# Patient Record
Sex: MALE | Race: WHITE | Employment: OTHER | URBAN - METROPOLITAN AREA
[De-identification: names, ages, dates, MRNs, and addresses within clinical notes are randomized per-mention and may not be internally consistent; named-entity substitution may affect disease eponyms.]

---

## 2017-02-10 DIAGNOSIS — M62.838 MUSCLE SPASM: ICD-10-CM

## 2017-02-10 RX ORDER — CYCLOBENZAPRINE HCL 10 MG
10 TABLET ORAL 3 TIMES DAILY PRN
Qty: 60 TABLET | Refills: 1 | Status: SHIPPED | OUTPATIENT
Start: 2017-02-10 | End: 2020-07-02

## 2017-02-14 ENCOUNTER — HOSPITAL ENCOUNTER (OUTPATIENT)
Age: 37
Setting detail: SPECIMEN
Discharge: HOME | End: 2017-02-14
Payer: MEDICARE

## 2017-02-14 ENCOUNTER — OFFICE VISIT (OUTPATIENT)
Dept: FAMILY MEDICINE CLINIC | Facility: CLINIC | Age: 37
End: 2017-02-14

## 2017-02-14 VITALS
WEIGHT: 224 LBS | DIASTOLIC BLOOD PRESSURE: 74 MMHG | HEART RATE: 89 BPM | BODY MASS INDEX: 29.69 KG/M2 | TEMPERATURE: 97.9 F | SYSTOLIC BLOOD PRESSURE: 112 MMHG | RESPIRATION RATE: 16 BRPM | HEIGHT: 73 IN | OXYGEN SATURATION: 97 %

## 2017-02-14 DIAGNOSIS — M54.42 ACUTE LEFT-SIDED LOW BACK PAIN WITH LEFT-SIDED SCIATICA: Primary | ICD-10-CM

## 2017-02-14 DIAGNOSIS — M54.42 CHRONIC BILATERAL LOW BACK PAIN WITH LEFT-SIDED SCIATICA: ICD-10-CM

## 2017-02-14 DIAGNOSIS — M25.50 ARTHRALGIA OF MULTIPLE JOINTS: ICD-10-CM

## 2017-02-14 DIAGNOSIS — R52 PAIN: ICD-10-CM

## 2017-02-14 DIAGNOSIS — G89.29 CHRONIC BILATERAL LOW BACK PAIN WITH LEFT-SIDED SCIATICA: ICD-10-CM

## 2017-02-14 DIAGNOSIS — M54.16 LUMBAR RADICULOPATHY: ICD-10-CM

## 2017-02-14 PROCEDURE — G8484 FLU IMMUNIZE NO ADMIN: HCPCS | Performed by: FAMILY MEDICINE

## 2017-02-14 PROCEDURE — 99213 OFFICE O/P EST LOW 20 MIN: CPT | Performed by: FAMILY MEDICINE

## 2017-02-14 PROCEDURE — G8427 DOCREV CUR MEDS BY ELIG CLIN: HCPCS | Performed by: FAMILY MEDICINE

## 2017-02-14 PROCEDURE — G8419 CALC BMI OUT NRM PARAM NOF/U: HCPCS | Performed by: FAMILY MEDICINE

## 2017-02-14 PROCEDURE — 4004F PT TOBACCO SCREEN RCVD TLK: CPT | Performed by: FAMILY MEDICINE

## 2017-02-14 RX ORDER — HYDROCODONE BITARTRATE AND ACETAMINOPHEN 5; 325 MG/1; MG/1
1 TABLET ORAL EVERY 6 HOURS PRN
Qty: 20 TABLET | Refills: 0 | Status: SHIPPED | OUTPATIENT
Start: 2017-02-14 | End: 2018-11-20

## 2017-02-14 ASSESSMENT — ENCOUNTER SYMPTOMS
CONSTIPATION: 0
SORE THROAT: 0
RHINORRHEA: 0
NAUSEA: 0
SHORTNESS OF BREATH: 0
WHEEZING: 0
COUGH: 0
BLOOD IN STOOL: 0
BACK PAIN: 1
DIARRHEA: 0
ABDOMINAL PAIN: 0
VOMITING: 0

## 2017-02-14 ASSESSMENT — PATIENT HEALTH QUESTIONNAIRE - PHQ9
1. LITTLE INTEREST OR PLEASURE IN DOING THINGS: 0
SUM OF ALL RESPONSES TO PHQ QUESTIONS 1-9: 0
2. FEELING DOWN, DEPRESSED OR HOPELESS: 0
SUM OF ALL RESPONSES TO PHQ9 QUESTIONS 1 & 2: 0

## 2017-02-17 LAB

## 2017-02-27 DIAGNOSIS — M15.9 PRIMARY OSTEOARTHRITIS INVOLVING MULTIPLE JOINTS: ICD-10-CM

## 2017-02-27 DIAGNOSIS — F41.8 DEPRESSION WITH ANXIETY: ICD-10-CM

## 2017-02-27 RX ORDER — MELOXICAM 15 MG/1
TABLET ORAL
Qty: 90 TABLET | Refills: 1 | Status: SHIPPED | OUTPATIENT
Start: 2017-02-27 | End: 2017-10-26 | Stop reason: SINTOL

## 2017-02-27 RX ORDER — ESCITALOPRAM OXALATE 10 MG/1
10 TABLET ORAL DAILY
Qty: 90 TABLET | Refills: 1 | Status: SHIPPED | OUTPATIENT
Start: 2017-02-27 | End: 2017-09-16 | Stop reason: SDUPTHER

## 2017-04-25 ENCOUNTER — HOSPITAL ENCOUNTER (OUTPATIENT)
Age: 37
Discharge: HOME | End: 2017-04-25
Payer: MEDICARE

## 2017-04-25 PROCEDURE — 82365 CALCULUS SPECTROSCOPY: CPT

## 2017-04-29 LAB
STONE COMPOSITION: NORMAL
STONE DESCRIPTION: NORMAL
STONE MASS: 8 MG
STONE NUMBER: 1
STONE SIZE: NORMAL MM

## 2017-05-05 ENCOUNTER — OFFICE VISIT (OUTPATIENT)
Dept: FAMILY MEDICINE CLINIC | Age: 37
End: 2017-05-05
Payer: MEDICARE

## 2017-05-05 VITALS
HEART RATE: 91 BPM | TEMPERATURE: 97.9 F | HEIGHT: 73 IN | SYSTOLIC BLOOD PRESSURE: 123 MMHG | BODY MASS INDEX: 30.09 KG/M2 | RESPIRATION RATE: 16 BRPM | WEIGHT: 227 LBS | OXYGEN SATURATION: 97 % | DIASTOLIC BLOOD PRESSURE: 76 MMHG

## 2017-05-05 DIAGNOSIS — M79.605 LEFT LEG PAIN: Primary | ICD-10-CM

## 2017-05-05 DIAGNOSIS — B00.9 HERPES SIMPLEX: ICD-10-CM

## 2017-05-05 DIAGNOSIS — M62.838 MUSCLE SPASM: ICD-10-CM

## 2017-05-05 DIAGNOSIS — M54.42 CHRONIC BILATERAL LOW BACK PAIN WITH LEFT-SIDED SCIATICA: ICD-10-CM

## 2017-05-05 DIAGNOSIS — G89.29 CHRONIC BILATERAL LOW BACK PAIN WITH LEFT-SIDED SCIATICA: ICD-10-CM

## 2017-05-05 DIAGNOSIS — N20.0 LEFT NEPHROLITHIASIS: ICD-10-CM

## 2017-05-05 DIAGNOSIS — M99.03 SOMATIC DYSFUNCTION OF SPINE, LUMBAR: ICD-10-CM

## 2017-05-05 DIAGNOSIS — S86.892A SHIN SPLINT, LEFT, INITIAL ENCOUNTER: ICD-10-CM

## 2017-05-05 PROBLEM — S86.899A SHIN SPLINT: Status: ACTIVE | Noted: 2017-05-05

## 2017-05-05 PROCEDURE — 98925 OSTEOPATH MANJ 1-2 REGIONS: CPT | Performed by: FAMILY MEDICINE

## 2017-05-05 PROCEDURE — G8417 CALC BMI ABV UP PARAM F/U: HCPCS | Performed by: FAMILY MEDICINE

## 2017-05-05 PROCEDURE — G8427 DOCREV CUR MEDS BY ELIG CLIN: HCPCS | Performed by: FAMILY MEDICINE

## 2017-05-05 PROCEDURE — 99214 OFFICE O/P EST MOD 30 MIN: CPT | Performed by: FAMILY MEDICINE

## 2017-05-05 PROCEDURE — 4004F PT TOBACCO SCREEN RCVD TLK: CPT | Performed by: FAMILY MEDICINE

## 2017-05-05 RX ORDER — TAMSULOSIN HYDROCHLORIDE 0.4 MG/1
0.4 CAPSULE ORAL DAILY
COMMUNITY
End: 2017-10-26 | Stop reason: ALTCHOICE

## 2017-05-05 RX ORDER — PROMETHAZINE HYDROCHLORIDE 25 MG/1
25 TABLET ORAL
COMMUNITY
Start: 2017-04-18 | End: 2017-05-18

## 2017-05-05 RX ORDER — VALACYCLOVIR HYDROCHLORIDE 1 G/1
TABLET, FILM COATED ORAL
Qty: 12 TABLET | Refills: 3 | Status: SHIPPED | OUTPATIENT
Start: 2017-05-05 | End: 2018-05-29 | Stop reason: SDUPTHER

## 2017-05-05 ASSESSMENT — ENCOUNTER SYMPTOMS
BACK PAIN: 1
ABDOMINAL PAIN: 0
NAUSEA: 0
VOMITING: 0
SORE THROAT: 0
COUGH: 0
DIARRHEA: 0
CONSTIPATION: 0
WHEEZING: 0
RHINORRHEA: 0
BLOOD IN STOOL: 0
SHORTNESS OF BREATH: 0

## 2017-05-05 ASSESSMENT — PATIENT HEALTH QUESTIONNAIRE - PHQ9
SUM OF ALL RESPONSES TO PHQ9 QUESTIONS 1 & 2: 0
SUM OF ALL RESPONSES TO PHQ QUESTIONS 1-9: 0
1. LITTLE INTEREST OR PLEASURE IN DOING THINGS: 0
2. FEELING DOWN, DEPRESSED OR HOPELESS: 0

## 2017-05-17 ENCOUNTER — OFFICE VISIT (OUTPATIENT)
Dept: FAMILY MEDICINE CLINIC | Age: 37
End: 2017-05-17
Payer: MEDICARE

## 2017-05-17 VITALS
RESPIRATION RATE: 16 BRPM | SYSTOLIC BLOOD PRESSURE: 133 MMHG | WEIGHT: 224 LBS | TEMPERATURE: 98.9 F | HEART RATE: 65 BPM | OXYGEN SATURATION: 95 % | BODY MASS INDEX: 29.69 KG/M2 | HEIGHT: 73 IN | DIASTOLIC BLOOD PRESSURE: 88 MMHG

## 2017-05-17 DIAGNOSIS — J06.9 ACUTE URI: ICD-10-CM

## 2017-05-17 DIAGNOSIS — J20.9 ACUTE BRONCHITIS, UNSPECIFIED ORGANISM: ICD-10-CM

## 2017-05-17 DIAGNOSIS — J01.91 ACUTE RECURRENT SINUSITIS, UNSPECIFIED LOCATION: ICD-10-CM

## 2017-05-17 DIAGNOSIS — M54.32 LEFT SIDED SCIATICA: ICD-10-CM

## 2017-05-17 DIAGNOSIS — M51.26 HERNIATED LUMBAR INTERVERTEBRAL DISC: ICD-10-CM

## 2017-05-17 DIAGNOSIS — M54.42 ACUTE LEFT-SIDED LOW BACK PAIN WITH LEFT-SIDED SCIATICA: Primary | ICD-10-CM

## 2017-05-17 DIAGNOSIS — M51.36 DDD (DEGENERATIVE DISC DISEASE), LUMBAR: ICD-10-CM

## 2017-05-17 PROCEDURE — 4004F PT TOBACCO SCREEN RCVD TLK: CPT | Performed by: FAMILY MEDICINE

## 2017-05-17 PROCEDURE — G8417 CALC BMI ABV UP PARAM F/U: HCPCS | Performed by: FAMILY MEDICINE

## 2017-05-17 PROCEDURE — 99214 OFFICE O/P EST MOD 30 MIN: CPT | Performed by: FAMILY MEDICINE

## 2017-05-17 PROCEDURE — G8427 DOCREV CUR MEDS BY ELIG CLIN: HCPCS | Performed by: FAMILY MEDICINE

## 2017-05-17 RX ORDER — AMOXICILLIN AND CLAVULANATE POTASSIUM 875; 125 MG/1; MG/1
1 TABLET, FILM COATED ORAL 2 TIMES DAILY
COMMUNITY
End: 2017-06-01 | Stop reason: ALTCHOICE

## 2017-05-17 RX ORDER — GUAIFENESIN/DEXTROMETHORPHAN 100-10MG/5
5-10 SYRUP ORAL 4 TIMES DAILY PRN
COMMUNITY
End: 2017-10-26 | Stop reason: ALTCHOICE

## 2017-05-17 ASSESSMENT — ENCOUNTER SYMPTOMS
CONSTIPATION: 0
VOMITING: 0
NAUSEA: 0
SHORTNESS OF BREATH: 1
SINUS PRESSURE: 1
ABDOMINAL PAIN: 0
COUGH: 1
BACK PAIN: 1
WHEEZING: 1
BLOOD IN STOOL: 0
RHINORRHEA: 1
DIARRHEA: 1

## 2017-06-01 ENCOUNTER — OFFICE VISIT (OUTPATIENT)
Dept: FAMILY MEDICINE CLINIC | Age: 37
End: 2017-06-01
Payer: MEDICARE

## 2017-06-01 VITALS
SYSTOLIC BLOOD PRESSURE: 117 MMHG | RESPIRATION RATE: 18 BRPM | BODY MASS INDEX: 29.82 KG/M2 | HEIGHT: 73 IN | TEMPERATURE: 97.7 F | WEIGHT: 225 LBS | HEART RATE: 85 BPM | OXYGEN SATURATION: 99 % | DIASTOLIC BLOOD PRESSURE: 85 MMHG

## 2017-06-01 DIAGNOSIS — R59.0 RETROPERITONEAL LYMPHADENOPATHY: ICD-10-CM

## 2017-06-01 DIAGNOSIS — J32.9 SINUSITIS, UNSPECIFIED CHRONICITY, UNSPECIFIED LOCATION: ICD-10-CM

## 2017-06-01 DIAGNOSIS — J30.89 PERENNIAL ALLERGIC RHINITIS WITH SEASONAL VARIATION: ICD-10-CM

## 2017-06-01 DIAGNOSIS — J30.2 PERENNIAL ALLERGIC RHINITIS WITH SEASONAL VARIATION: ICD-10-CM

## 2017-06-01 DIAGNOSIS — J20.9 ACUTE BRONCHITIS, UNSPECIFIED ORGANISM: Primary | ICD-10-CM

## 2017-06-01 PROCEDURE — G8417 CALC BMI ABV UP PARAM F/U: HCPCS | Performed by: FAMILY MEDICINE

## 2017-06-01 PROCEDURE — 99213 OFFICE O/P EST LOW 20 MIN: CPT | Performed by: FAMILY MEDICINE

## 2017-06-01 PROCEDURE — G8427 DOCREV CUR MEDS BY ELIG CLIN: HCPCS | Performed by: FAMILY MEDICINE

## 2017-06-01 PROCEDURE — 4004F PT TOBACCO SCREEN RCVD TLK: CPT | Performed by: FAMILY MEDICINE

## 2017-06-01 RX ORDER — LORATADINE 10 MG/1
10 TABLET ORAL DAILY PRN
COMMUNITY
End: 2020-12-28

## 2017-06-01 ASSESSMENT — ENCOUNTER SYMPTOMS
TROUBLE SWALLOWING: 0
DIARRHEA: 1
BLOOD IN STOOL: 0
RHINORRHEA: 1
COUGH: 1
SHORTNESS OF BREATH: 0
SORE THROAT: 0
NAUSEA: 1
VOICE CHANGE: 0
ABDOMINAL PAIN: 0
WHEEZING: 1
SINUS PRESSURE: 1
BACK PAIN: 1
VOMITING: 0
CONSTIPATION: 0

## 2017-06-20 ENCOUNTER — HOSPITAL ENCOUNTER (OUTPATIENT)
Age: 37
Discharge: HOME | End: 2017-06-20
Payer: MEDICARE

## 2017-06-20 LAB
CALCIUM SERPL-MCNC: 9.6 MG/DL (ref 8.6–10.4)
PTH INTACT: 27.04 PG/ML (ref 15–65)
URIC ACID: 7.6 MG/DL (ref 3.4–7)

## 2017-06-20 PROCEDURE — 82310 ASSAY OF CALCIUM: CPT

## 2017-06-20 PROCEDURE — 83970 ASSAY OF PARATHORMONE: CPT

## 2017-06-20 PROCEDURE — 84550 ASSAY OF BLOOD/URIC ACID: CPT

## 2017-06-20 PROCEDURE — 36415 COLL VENOUS BLD VENIPUNCTURE: CPT

## 2017-06-21 ENCOUNTER — OFFICE VISIT (OUTPATIENT)
Dept: FAMILY MEDICINE CLINIC | Age: 37
End: 2017-06-21
Payer: MEDICARE

## 2017-06-21 VITALS
WEIGHT: 221 LBS | TEMPERATURE: 97.6 F | OXYGEN SATURATION: 98 % | SYSTOLIC BLOOD PRESSURE: 120 MMHG | DIASTOLIC BLOOD PRESSURE: 84 MMHG | HEART RATE: 83 BPM | RESPIRATION RATE: 14 BRPM | BODY MASS INDEX: 29.29 KG/M2 | HEIGHT: 73 IN

## 2017-06-21 DIAGNOSIS — H92.02 OTALGIA, LEFT: Primary | ICD-10-CM

## 2017-06-21 DIAGNOSIS — J06.9 UPPER RESPIRATORY TRACT INFECTION, UNSPECIFIED TYPE: ICD-10-CM

## 2017-06-21 DIAGNOSIS — H69.82 EUSTACHIAN TUBE DYSFUNCTION, LEFT: ICD-10-CM

## 2017-06-21 PROCEDURE — 99213 OFFICE O/P EST LOW 20 MIN: CPT | Performed by: FAMILY MEDICINE

## 2017-06-21 PROCEDURE — G8417 CALC BMI ABV UP PARAM F/U: HCPCS | Performed by: FAMILY MEDICINE

## 2017-06-21 PROCEDURE — 4004F PT TOBACCO SCREEN RCVD TLK: CPT | Performed by: FAMILY MEDICINE

## 2017-06-21 PROCEDURE — G8427 DOCREV CUR MEDS BY ELIG CLIN: HCPCS | Performed by: FAMILY MEDICINE

## 2017-06-21 RX ORDER — CEFUROXIME AXETIL 250 MG/1
250 TABLET ORAL 2 TIMES DAILY
Qty: 20 TABLET | Refills: 0 | Status: SHIPPED | OUTPATIENT
Start: 2017-06-21 | End: 2017-07-01

## 2017-06-21 ASSESSMENT — ENCOUNTER SYMPTOMS
SHORTNESS OF BREATH: 0
ABDOMINAL PAIN: 0
DIARRHEA: 0
VOMITING: 0
COUGH: 1
BLOOD IN STOOL: 0
SORE THROAT: 0
NAUSEA: 0
WHEEZING: 0
CONSTIPATION: 0
TROUBLE SWALLOWING: 0

## 2017-06-22 PROBLEM — H69.80 EUSTACHIAN TUBE DYSFUNCTION: Status: ACTIVE | Noted: 2017-06-22

## 2017-06-22 PROBLEM — J06.9 UPPER RESPIRATORY TRACT INFECTION: Status: ACTIVE | Noted: 2017-06-22

## 2017-06-22 PROBLEM — H69.90 EUSTACHIAN TUBE DYSFUNCTION: Status: ACTIVE | Noted: 2017-06-22

## 2017-06-22 PROBLEM — H92.09 OTALGIA: Status: ACTIVE | Noted: 2017-06-22

## 2017-07-24 RX ORDER — PRAVASTATIN SODIUM 40 MG
40 TABLET ORAL DAILY
Qty: 90 TABLET | Refills: 0 | Status: SHIPPED | OUTPATIENT
Start: 2017-07-24 | End: 2017-10-15 | Stop reason: SDUPTHER

## 2017-10-26 ENCOUNTER — OFFICE VISIT (OUTPATIENT)
Dept: FAMILY MEDICINE CLINIC | Age: 37
End: 2017-10-26
Payer: MEDICARE

## 2017-10-26 VITALS
RESPIRATION RATE: 16 BRPM | HEIGHT: 73 IN | BODY MASS INDEX: 30.35 KG/M2 | DIASTOLIC BLOOD PRESSURE: 69 MMHG | HEART RATE: 83 BPM | SYSTOLIC BLOOD PRESSURE: 105 MMHG | OXYGEN SATURATION: 97 % | TEMPERATURE: 97.5 F | WEIGHT: 229 LBS

## 2017-10-26 DIAGNOSIS — Z98.1 STATUS POST LUMBAR SPINAL FUSION: ICD-10-CM

## 2017-10-26 DIAGNOSIS — I10 ESSENTIAL HYPERTENSION: Primary | ICD-10-CM

## 2017-10-26 DIAGNOSIS — E78.2 HYPERLIPIDEMIA, MIXED: ICD-10-CM

## 2017-10-26 DIAGNOSIS — M15.9 PRIMARY OSTEOARTHRITIS INVOLVING MULTIPLE JOINTS: ICD-10-CM

## 2017-10-26 DIAGNOSIS — Z23 NEED FOR INFLUENZA VACCINATION: ICD-10-CM

## 2017-10-26 PROCEDURE — 90686 IIV4 VACC NO PRSV 0.5 ML IM: CPT | Performed by: FAMILY MEDICINE

## 2017-10-26 PROCEDURE — G8427 DOCREV CUR MEDS BY ELIG CLIN: HCPCS | Performed by: FAMILY MEDICINE

## 2017-10-26 PROCEDURE — 4004F PT TOBACCO SCREEN RCVD TLK: CPT | Performed by: FAMILY MEDICINE

## 2017-10-26 PROCEDURE — G8484 FLU IMMUNIZE NO ADMIN: HCPCS | Performed by: FAMILY MEDICINE

## 2017-10-26 PROCEDURE — G0008 ADMIN INFLUENZA VIRUS VAC: HCPCS | Performed by: FAMILY MEDICINE

## 2017-10-26 PROCEDURE — 99213 OFFICE O/P EST LOW 20 MIN: CPT | Performed by: FAMILY MEDICINE

## 2017-10-26 PROCEDURE — G8417 CALC BMI ABV UP PARAM F/U: HCPCS | Performed by: FAMILY MEDICINE

## 2017-10-26 RX ORDER — GABAPENTIN 100 MG/1
200 CAPSULE ORAL NIGHTLY
Refills: 0 | COMMUNITY
Start: 2017-09-24 | End: 2018-02-01 | Stop reason: SDUPTHER

## 2017-10-26 RX ORDER — VIT C/B6/B5/MAGNESIUM/HERB 173 50-5-6-5MG
1 CAPSULE ORAL
COMMUNITY
End: 2018-09-04

## 2017-10-26 ASSESSMENT — ENCOUNTER SYMPTOMS
SHORTNESS OF BREATH: 0
BLOOD IN STOOL: 0
RHINORRHEA: 0
COUGH: 0
ABDOMINAL PAIN: 0
BACK PAIN: 1
DIARRHEA: 0
CONSTIPATION: 0
WHEEZING: 0
VOMITING: 0
NAUSEA: 0
SORE THROAT: 0
TROUBLE SWALLOWING: 0

## 2017-10-26 NOTE — PROGRESS NOTES
Visit Information    Have you changed or started any medications since your last visit including any over-the-counter medicines, vitamins, or herbal medicines? Yes, Neurontin   Are you having any side effects from any of your medications? -  no  Have you stopped taking any of your medications? Is so, why? -  yes , Mobic due to diarrhea    Have you seen any other physician or provider since your last visit? Yes - Records Obtained Dr Ivon Jenkins,   Have you had any other diagnostic tests since your last visit? Yes - Records Obtained labs  Have you been seen in the emergency room and/or had an admission to a hospital since we last saw you? Yes - Records Obtained  Have you had your routine dental cleaning in the past 6 months? no    Have you activated your [employer] account? If not, what are your barriers?  Yes     Patient Care Team:  Olayinka Amaya DO as PCP - General (Family Medicine)  Olayinka Amaya DO as PCP - [facility] Attributed Provider  Maribel Valentin MD as Consulting Physician (Rheumatology)  Misael Bustillo as Consulting Physician (Orthopedic Surgery)  Radha Lebron MD as Consulting Physician (Gastroenterology)  Naz Shelby DPM (Inactive) (Podiatry)  Marilee Schaffer MD as Consulting Physician (Cardiology)  Perez Teresa MD as Consulting Physician (Urology)    Medical History Review  Past Medical, Family, and Social History reviewed and does not contribute to the patient presenting condition    Health Maintenance   Topic Date Due    HIV screen  01/12/1995    Flu vaccine (1) 09/01/2017    DTaP/Tdap/Td vaccine (2 - Td) 02/18/2023    Pneumococcal med risk  Completed

## 2017-10-26 NOTE — PATIENT INSTRUCTIONS
Continue Diet low salt, high fiber, avoiding concentrated sweets. Continue activity and exercise as tolerated. Continue present medications as ordered, noted. Note the flu shot today. Note the election to wait on additional labs until January visit. Keep follow up with the back surgeon as he recommends. Patient Education        DASH Diet: Care Instructions  Your Care Instructions  The DASH diet is an eating plan that can help lower your blood pressure. DASH stands for Dietary Approaches to Stop Hypertension. Hypertension is high blood pressure. The DASH diet focuses on eating foods that are high in calcium, potassium, and magnesium. These nutrients can lower blood pressure. The foods that are highest in these nutrients are fruits, vegetables, low-fat dairy products, nuts, seeds, and legumes. But taking calcium, potassium, and magnesium supplements instead of eating foods that are high in those nutrients does not have the same effect. The DASH diet also includes whole grains, fish, and poultry. The DASH diet is one of several lifestyle changes your doctor may recommend to lower your high blood pressure. Your doctor may also want you to decrease the amount of sodium in your diet. Lowering sodium while following the DASH diet can lower blood pressure even further than just the DASH diet alone. Follow-up care is a key part of your treatment and safety. Be sure to make and go to all appointments, and call your doctor if you are having problems. It's also a good idea to know your test results and keep a list of the medicines you take. How can you care for yourself at home? Following the DASH diet  · Eat 4 to 5 servings of fruit each day. A serving is 1 medium-sized piece of fruit, ½ cup chopped or canned fruit, 1/4 cup dried fruit, or 4 ounces (½ cup) of fruit juice. Choose fruit more often than fruit juice. · Eat 4 to 5 servings of vegetables each day.  A serving is 1 cup of lettuce or raw leafy the flu, it can make symptoms less severe and reduce the chance of problems from the flu. Sometimes, young children and people who have an immune system problem may have a slight fever or muscle aches or pains 6 to 12 hours after getting the shot. These symptoms usually last 1 or 2 days. Follow-up care is a key part of your treatment and safety. Be sure to make and go to all appointments, and call your doctor if you are having problems. It's also a good idea to know your test results and keep a list of the medicines you take. Who should get the flu vaccine? Everyone age 7 months or older should get a flu vaccine each year. It lowers the chance of getting and spreading the flu. The vaccine is very important for people who are at high risk for getting other health problems from the flu. This includes:  · Anyone 48years of age or older. · People who live in a long-term care center, such as a nursing home. · All children 6 months through 25years of age. · Adults and children 6 months and older who have long-term heart or lung problems, such as asthma. · Adults and children 6 months and older who needed medical care or were in a hospital during the past year because of diabetes, chronic kidney disease, or a weak immune system (including HIV or AIDS). · Women who will be pregnant during the flu season. · People who have any condition that can make it hard to breathe or swallow (such as a brain injury or muscle disorders). · People who can give the flu to others who are at high risk for problems from the flu. This includes all health care workers and close contacts of people age 72 or older. Who should not get the flu vaccine? The person who gives the vaccine may tell you not to get it if you:  · Have a severe allergy to eggs or any part of the vaccine. · Have had a severe reaction to a flu vaccine in the past.  · Have had Guillain-Barré syndrome (GBS). · Are sick with a fever.  (Get the vaccine when

## 2017-10-26 NOTE — PROGRESS NOTES
CAPS Take 1 capsule by mouth daily (with breakfast)      pravastatin (PRAVACHOL) 40 MG tablet take 1 tablet by mouth once daily 90 tablet 0    escitalopram (LEXAPRO) 10 MG tablet take 1 tablet by mouth once daily 90 tablet 1    metoprolol succinate (TOPROL XL) 25 MG extended release tablet take 1 tablet by mouth twice a day 60 tablet 5    loratadine (CLARITIN) 10 MG tablet Take 10 mg by mouth daily as needed (allergies)      valACYclovir (VALTREX) 1 G tablet take 2 tablets by mouth AT FIRST SIGN OF COLD SORE AND THEN REPEAT IN 12 HOURS 12 tablet 3    HYDROcodone-acetaminophen (NORCO) 5-325 MG per tablet Take 1 tablet by mouth every 6 hours as needed for Pain  Sedation warning. . 20 tablet 0    cyclobenzaprine (FLEXERIL) 10 MG tablet Take 1 tablet by mouth 3 times daily as needed for Muscle spasms (sedation warning.) 60 tablet 1    Omeprazole Magnesium (PRILOSEC OTC PO) Take 1 capsule by mouth every morning (before breakfast)      albuterol (PROAIR HFA) 108 (90 BASE) MCG/ACT inhaler Inhale 2 puffs into the lungs every 4 hours as needed for Wheezing or Shortness of Breath 1 Inhaler 3    traZODone (DESYREL) 50 MG tablet Take 50 mg by mouth Pt takes as needed. 0    CREON 35968 UNITS CPEP Three caps with meals and two caps with snacks. 0    Coenzyme Q10 (COQ-10) 100 MG CAPS Take 1 capsule by mouth daily.  fluticasone (FLONASE) 50 MCG/ACT nasal spray 1 spray by Nasal route daily. 2 sprays each nostril daily      Multiple Vitamins-Minerals (ALIVE MENS ENERGY PO) Take 1 tablet by mouth daily.  Omega-3 Fatty Acids (FISH OIL) 1360 MG CAPS Take 1 capsule by mouth 2 times daily. 1760mg per day. 2tab. At  bedtime      aspirin-acetaminophen-caffeine (EXCEDRIN MIGRAINE) 250-250-65 MG per tablet Take 2 tablets by mouth daily as needed.  aspirin 81 MG EC tablet Take 81 mg by mouth daily.  baclofen (LIORESAL) 10 MG tablet Take 1-2 tablets by mouth 3 times daily as needed (muscle spasm).  60 tablet vaping.      Alcohol use No      Comment: rare, holiday only    Drug use: No    Sexual activity: Yes     Partners: Female      Comment: spouse     Other Topics Concern    Not on file     Social History Narrative    No narrative on file     Patient Active Problem List   Diagnosis    DDD (degenerative disc disease)    Radiculopathy    Situational stress    Chest pain    Herpes simplex    Arthralgia of multiple joints    Leukocytosis (leucocytosis)    Migraine headache    Bilateral knee pain    Lower back pain    Recurrent abdominal pain    Hyperlipidemia, mixed    Smoking addiction    Ulnar neuritis    Internal derangement of shoulder    Pain    Muscle spasm    Medial epicondylitis of left elbow    Sciatica of left side    Medial epicondylitis of right elbow    Perennial allergic rhinitis with seasonal variation    Myalgia    Bronchitis with bronchospasm    Chronic mixed headache syndrome    Mid back pain    Paresthesia of left leg    Neck pain    Ulnar neuropathy at elbow    CTS (carpal tunnel syndrome)    Cardiac dysrhythmia    Inflammation of operative incision    Incisional infection    Hyperhidrosis    Depression with anxiety    Left arm pain    Heart palpitations    Folliculitis    Subluxation of right shoulder joint    Chronic pain    Sinusitis    Dizziness    Anterior cervical lymphadenopathy    GERD with stricture    Erosive esophagitis    Erosive gastropathy    Left sided sciatica    Bronchospasm    Primary osteoarthritis involving multiple joints    Muscle spasm of back    Essential hypertension    Family history of early CAD    Mild intermittent asthma without complication    Mixed headache    DDD (degenerative disc disease), lumbar    Acute left-sided low back pain with left-sided sciatica    Herniated lumbar intervertebral disc    Failed back syndrome of lumbar spine    Left nephrolithiasis    Left leg pain    Shin splint    Chronic bilateral low back pain with left-sided sciatica    Somatic dysfunction of spine, lumbar    Acute bronchitis    Retroperitoneal lymphadenopathy    Otalgia    Upper respiratory tract infection    Eustachian tube dysfunction       Review of Systems   Constitutional: Positive for activity change (still limited, but still on home exercise program. ). Negative for appetite change, chills and fever. HENT: Negative for congestion, ear pain, rhinorrhea, sore throat and trouble swallowing. Respiratory: Negative for cough, shortness of breath and wheezing. Cardiovascular: Negative for chest pain, palpitations and leg swelling. Gastrointestinal: Negative for abdominal pain, blood in stool, constipation, diarrhea, nausea and vomiting. Genitourinary: Negative for dysuria, frequency, hematuria and urgency. Musculoskeletal: Positive for arthralgias (ongoing, shoulders, knees, etc. ) and back pain (as noted, in CC). Skin: Negative for rash and wound (present healing well. ). Neurological: Positive for weakness (left upper extremity, and still working on the left leg. ), numbness (left upper extremity, hand. also the left thigh) and headaches (ongoing, and very related to neck positions. ). Negative for dizziness and syncope. Psychiatric/Behavioral:        Presently fairly good with current treatment. Objective:     Physical Exam   Constitutional: He is oriented to person, place, and time. He appears well-developed and well-nourished. No distress. /69   Pulse 83   Temp 97.5 °F (36.4 °C) (Oral)   Resp 16   Ht 6' 0.83\" (1.85 m)   Wt 229 lb (103.9 kg)   SpO2 97%   BMI 30.35 kg/m²      HENT:   Head: Normocephalic. Neck: No JVD present. No thyromegaly present. Cardiovascular: Normal rate, regular rhythm, normal heart sounds and intact distal pulses. No murmur heard. Pulmonary/Chest: Effort normal and breath sounds normal. No respiratory distress. He has no wheezes.  He has no rales.   Abdominal: Soft. Bowel sounds are normal. He exhibits no distension and no mass. There is no tenderness. Musculoskeletal: He exhibits no edema (neg Homans'). Note the ongoing peripheral joint changes, especially bilateral shoulders and knees still. Note the lumbar spine incisions grossly healing well. Still tenderness about the area. Lymphadenopathy:     He has no cervical adenopathy. Neurological: He is alert and oriented to person, place, and time. Note the ongoing decreased sensation of the left forearm and hand. Also note the apparent relative weakness of the legs yet. Skin: No rash noted. Vitals reviewed. Assessment:     1. Essential hypertension     2. Hyperlipidemia, mixed     3. Status post lumbar spinal fusion     4. Primary osteoarthritis involving multiple joints     5. Need for influenza vaccination  INFLUENZA, QUADV, 3 YRS AND OLDER, IM, PF, PREFILL SYR OR SDV, 0.5ML (FLUZONE QUADV, PF)       Plan:     Case thoroughly discussed with patient and proposed management and DDg. Patient Instructions   Continue Diet low salt, high fiber, avoiding concentrated sweets. Continue activity and exercise as tolerated. Continue present medications as ordered, noted. Note the flu shot today. Note the election to wait on additional labs until January visit. Keep follow up with the back surgeon as he recommends. Patient Education        DASH Diet: Care Instructions  Your Care Instructions  The DASH diet is an eating plan that can help lower your blood pressure. DASH stands for Dietary Approaches to Stop Hypertension. Hypertension is high blood pressure. The DASH diet focuses on eating foods that are high in calcium, potassium, and magnesium. These nutrients can lower blood pressure. The foods that are highest in these nutrients are fruits, vegetables, low-fat dairy products, nuts, seeds, and legumes.  But taking calcium, potassium, and magnesium supplements vegetable oil or 2 tablespoons of salad dressing. · Limit sweets and added sugars to 5 servings or less a week. A serving is 1 tablespoon jelly or jam, ½ cup sorbet, or 1 cup of lemonade. · Eat less than 2,300 milligrams (mg) of sodium a day. If you limit your sodium to 1,500 mg a day, you can lower your blood pressure even more. Tips for success  · Start small. Do not try to make dramatic changes to your diet all at once. You might feel that you are missing out on your favorite foods and then be more likely to not follow the plan. Make small changes, and stick with them. Once those changes become habit, add a few more changes. · Try some of the following:  ¨ Make it a goal to eat a fruit or vegetable at every meal and at snacks. This will make it easy to get the recommended amount of fruits and vegetables each day. ¨ Try yogurt topped with fruit and nuts for a snack or healthy dessert. ¨ Add lettuce, tomato, cucumber, and onion to sandwiches. ¨ Combine a ready-made pizza crust with low-fat mozzarella cheese and lots of vegetable toppings. Try using tomatoes, squash, spinach, broccoli, carrots, cauliflower, and onions. ¨ Have a variety of cut-up vegetables with a low-fat dip as an appetizer instead of chips and dip. ¨ Sprinkle sunflower seeds or chopped almonds over salads. Or try adding chopped walnuts or almonds to cooked vegetables. ¨ Try some vegetarian meals using beans and peas. Add garbanzo or kidney beans to salads. Make burritos and tacos with mashed deutsch beans or black beans. Where can you learn more? Go to https://MetaCure.Kannact. org and sign in to your [employer] account. Enter V562 in the [employer] box to learn more about \"DASH Diet: Care Instructions. \"     If you do not have an account, please click on the \"Sign Up Now\" link. Current as of: April 3, 2017  Content Version: 11.3  © 7446-6718 [employer].  Care instructions adapted under license by [facility]. If you have questions about a medical condition or this instruction, always ask your healthcare professional. [place] [address] any warranty or liability for your use of this information. Patient Education        Influenza (Flu) Vaccine: Care Instructions  Your Care Instructions  Influenza (flu) is an infection in the lungs and breathing passages. It is caused by the influenza virus. There are different strains, or types, of the flu virus every year. The flu comes on quickly. It can cause a cough, stuffy nose, fever, chills, tiredness, and aches and pains. These symptoms may last up to 10 days. The flu can make you feel very sick, but most of the time it doesn't lead to other problems. But it can cause serious problems in people who are older or who have a long-term illness, such as heart disease or diabetes. You can help prevent the flu by getting a flu vaccine every year, as soon as it is available. You cannot get the flu from the vaccine. The vaccine prevents most cases of the flu. But even when the vaccine doesn't prevent the flu, it can make symptoms less severe and reduce the chance of problems from the flu. Sometimes, young children and people who have an immune system problem may have a slight fever or muscle aches or pains 6 to 12 hours after getting the shot. These symptoms usually last 1 or 2 days. Follow-up care is a key part of your treatment and safety. Be sure to make and go to all appointments, and call your doctor if you are having problems. It's also a good idea to know your test results and keep a list of the medicines you take. Who should get the flu vaccine? Everyone age 7 months or older should get a flu vaccine each year. It lowers the chance of getting and spreading the flu. The vaccine is very important for people who are at high risk for getting other health problems from the flu. This includes:  · Anyone 48years of age or older.   · People who live in a long-term care center, such as a nursing home. · All children 6 months through 25years of age. · Adults and children 6 months and older who have long-term heart or lung problems, such as asthma. · Adults and children 6 months and older who needed medical care or were in a hospital during the past year because of diabetes, chronic kidney disease, or a weak immune system (including HIV or AIDS). · Women who will be pregnant during the flu season. · People who have any condition that can make it hard to breathe or swallow (such as a brain injury or muscle disorders). · People who can give the flu to others who are at high risk for problems from the flu. This includes all health care workers and close contacts of people age 72 or older. Who should not get the flu vaccine? The person who gives the vaccine may tell you not to get it if you:  · Have a severe allergy to eggs or any part of the vaccine. · Have had a severe reaction to a flu vaccine in the past.  · Have had Guillain-Barré syndrome (GBS). · Are sick with a fever. (Get the vaccine when symptoms are gone.)  How can you care for yourself at home? · If you or your child has a sore arm or a slight fever after the shot, take an over-the-counter pain medicine, such as acetaminophen (Tylenol) or ibuprofen (Advil, Motrin). Read and follow all instructions on the label. Do not give aspirin to anyone younger than 20. It has been linked to Reye syndrome, a serious illness. · Do not take two or more pain medicines at the same time unless the doctor told you to. Many pain medicines have acetaminophen, which is Tylenol. Too much acetaminophen (Tylenol) can be harmful. When should you call for help? Call 911 anytime you think you may need emergency care. For example, call if after getting the flu vaccine:  · You have symptoms of a severe reaction to the flu vaccine. Symptoms of a severe reaction may include:  ¨ Severe difficulty breathing.   ¨ Sudden raised, red areas (hives) all over your body. ¨ Severe lightheadedness. Call your doctor now or seek immediate medical care if after getting the flu vaccine:  · You think you are having a reaction to the flu vaccine, such as a new fever. Watch closely for changes in your health, and be sure to contact your doctor if you have any problems. Where can you learn more? Go to https://[email or web address] [email or web address]. org and sign in to your [employer] account. Enter H097 in the [facility] box to learn more about \"Influenza (Flu) Vaccine: Care Instructions. \"     If you do not have an account, please click on the \"Sign Up Now\" link. Current as of: August 1, 2016  Content Version: 11.3  © 0950-2872 [employer]. Care instructions adapted under license by [facility] ([facility]). If you have questions about a medical condition or this instruction, always ask your healthcare professional. [place] [address] any warranty or liability for your use of this information. Orders Placed This Encounter   Procedures    INFLUENZA, QUADV, 3 YRS AND OLDER, IM, PF, PREFILL SYR OR SDV, 0.5ML (FLUZONE QUADV, PF)            Denver received counseling on the following healthy behaviors: nutrition, exercise, medication adherence and tobacco cessation    Patient given educational materials on Nutrition, Hypertension and influenza vaccine. I have instructed [place] to complete a self tracking handout on nothing this date. Discussed use, benefit, and side effects of prescribed medications. Barriers to medication compliance addressed. All patient questions answered. Pt voiced understanding.

## 2017-10-27 PROBLEM — Z98.1 STATUS POST LUMBAR SPINAL FUSION: Status: ACTIVE | Noted: 2017-10-27

## 2018-01-03 ENCOUNTER — OFFICE VISIT (OUTPATIENT)
Dept: FAMILY MEDICINE CLINIC | Age: 38
End: 2018-01-03
Payer: MEDICARE

## 2018-01-03 VITALS
BODY MASS INDEX: 30.35 KG/M2 | WEIGHT: 229 LBS | HEIGHT: 73 IN | SYSTOLIC BLOOD PRESSURE: 108 MMHG | TEMPERATURE: 97.9 F | OXYGEN SATURATION: 97 % | DIASTOLIC BLOOD PRESSURE: 75 MMHG | RESPIRATION RATE: 16 BRPM | HEART RATE: 72 BPM

## 2018-01-03 DIAGNOSIS — K22.2 GERD WITH STRICTURE: ICD-10-CM

## 2018-01-03 DIAGNOSIS — I10 ESSENTIAL HYPERTENSION: Primary | ICD-10-CM

## 2018-01-03 DIAGNOSIS — R20.2 PARESTHESIA OF LEFT UPPER EXTREMITY: ICD-10-CM

## 2018-01-03 DIAGNOSIS — E78.2 HYPERLIPIDEMIA, MIXED: ICD-10-CM

## 2018-01-03 DIAGNOSIS — Z11.4 SCREENING FOR HIV WITHOUT PRESENCE OF RISK FACTORS: ICD-10-CM

## 2018-01-03 DIAGNOSIS — M15.9 PRIMARY OSTEOARTHRITIS INVOLVING MULTIPLE JOINTS: ICD-10-CM

## 2018-01-03 DIAGNOSIS — K21.9 GERD WITH STRICTURE: ICD-10-CM

## 2018-01-03 PROCEDURE — 99214 OFFICE O/P EST MOD 30 MIN: CPT | Performed by: FAMILY MEDICINE

## 2018-01-03 ASSESSMENT — ENCOUNTER SYMPTOMS
EYE REDNESS: 0
BLOOD IN STOOL: 0
VOICE CHANGE: 0
ABDOMINAL PAIN: 0
EYE ITCHING: 0
WHEEZING: 0
SHORTNESS OF BREATH: 0
RHINORRHEA: 0
NAUSEA: 0
COUGH: 1
SORE THROAT: 0
SINUS PRESSURE: 0
CONSTIPATION: 0
VOMITING: 0
BACK PAIN: 1
COLOR CHANGE: 0

## 2018-01-03 NOTE — PROGRESS NOTES
Visit Information    Have you changed or started any medications since your last visit including any over-the-counter medicines, vitamins, or herbal medicines? no   Are you having any side effects from any of your medications? -  no  Have you stopped taking any of your medications? Is so, why? -  no    Have you seen any other physician or provider since your last visit? Yes - Records Requested Dr Vinay Richter  Have you had any other diagnostic tests since your last visit? No  Have you been seen in the emergency room and/or had an admission to a hospital since we last saw you? No  Have you had your routine dental cleaning in the past 6 months? no    Have you activated your [employer] account? If not, what are your barriers?  Yes     Patient Care Team:  Roosevelt Elizalde DO as PCP - General (Family Medicine)  Roosevelt Elizalde DO as PCP - [facility] Attributed Provider  Stephen Wilkinson MD as Consulting Physician (Rheumatology)  Nura Damian as Consulting Physician (Orthopedic Surgery)  Josefa Jacobsen MD as Consulting Physician (Gastroenterology)  Daniel Hanks DPM (Inactive) (Podiatry)  Gissell Reyez MD as Consulting Physician (Cardiology)  Deana Lopez MD as Consulting Physician (Urology)    Medical History Review  Past Medical, Family, and Social History reviewed and does not contribute to the patient presenting condition    Health Maintenance   Topic Date Due    HIV screen  01/12/1995    DTaP/Tdap/Td vaccine (2 - Td) 02/18/2023    Flu vaccine  Completed    Pneumococcal med risk  Completed

## 2018-01-03 NOTE — PATIENT INSTRUCTIONS
cucumber, and onion to sandwiches. ¨ Combine a ready-made pizza crust with low-fat mozzarella cheese and lots of vegetable toppings. Try using tomatoes, squash, spinach, broccoli, carrots, cauliflower, and onions. ¨ Have a variety of cut-up vegetables with a low-fat dip as an appetizer instead of chips and dip. ¨ Sprinkle sunflower seeds or chopped almonds over salads. Or try adding chopped walnuts or almonds to cooked vegetables. ¨ Try some vegetarian meals using beans and peas. Add garbanzo or kidney beans to salads. Make burritos and tacos with mashed deutsch beans or black beans. Where can you learn more? Go to [email or web address] and sign in to your Airgain account. Enter D588 in the [employer] box to learn more about \"DASH Diet: Care Instructions. \"     If you do not have an account, please click on the \"Sign Up Now\" link. Current as of: September 21, 2016  Content Version: 11.4  © 9517-0070 [employer]. Care instructions adapted under license by [facility] ([facility]). If you have questions about a medical condition or this instruction, always ask your healthcare professional. [address] any warranty or liability for your use of this information. Patient Education        Stopping Smoking: Care Instructions  Your Care Instructions    Cigarette smokers crave the nicotine in cigarettes. Giving it up is much harder than simply changing a habit. Your body has to stop craving the nicotine. It is hard to quit, but you can do it. There are many tools that people use to quit smoking. You may find that combining tools works best for you. There are several steps to quitting. First you get ready to quit. Then you get support to help you. After that, you learn new skills and behaviors to become a nonsmoker. For many people, a necessary step is getting and using medicine. Your doctor will help you set up the plan that best meets your needs.  You may professional. [address] any warranty or liability for your use of this information.

## 2018-01-11 ENCOUNTER — HOSPITAL ENCOUNTER (OUTPATIENT)
Age: 38
Discharge: HOME | End: 2018-01-11
Payer: MEDICARE

## 2018-01-11 DIAGNOSIS — K22.2 GERD WITH STRICTURE: ICD-10-CM

## 2018-01-11 DIAGNOSIS — E78.2 HYPERLIPIDEMIA, MIXED: ICD-10-CM

## 2018-01-11 DIAGNOSIS — R20.2 PARESTHESIA OF LEFT UPPER EXTREMITY: ICD-10-CM

## 2018-01-11 DIAGNOSIS — M15.9 PRIMARY OSTEOARTHRITIS INVOLVING MULTIPLE JOINTS: ICD-10-CM

## 2018-01-11 DIAGNOSIS — K21.9 GERD WITH STRICTURE: ICD-10-CM

## 2018-01-11 DIAGNOSIS — Z11.4 SCREENING FOR HIV WITHOUT PRESENCE OF RISK FACTORS: ICD-10-CM

## 2018-01-11 DIAGNOSIS — I10 ESSENTIAL HYPERTENSION: ICD-10-CM

## 2018-01-11 LAB
ABSOLUTE EOS #: 0.1 K/UL (ref 0–0.4)
ABSOLUTE IMMATURE GRANULOCYTE: NORMAL K/UL (ref 0–0.3)
ABSOLUTE LYMPH #: 2.3 K/UL (ref 1–4.8)
ABSOLUTE MONO #: 0.5 K/UL (ref 0.2–0.8)
ALBUMIN SERPL-MCNC: 4.5 G/DL (ref 3.5–5.2)
ALBUMIN/GLOBULIN RATIO: ABNORMAL (ref 1–2.5)
ALP BLD-CCNC: 77 U/L (ref 40–129)
ALT SERPL-CCNC: 20 U/L (ref 5–41)
ANION GAP SERPL CALCULATED.3IONS-SCNC: 14 MMOL/L (ref 9–17)
AST SERPL-CCNC: 16 U/L
BASOPHILS # BLD: 1 % (ref 0–2)
BASOPHILS ABSOLUTE: 0.1 K/UL (ref 0–0.2)
BILIRUB SERPL-MCNC: 0.8 MG/DL (ref 0.3–1.2)
BUN BLDV-MCNC: 18 MG/DL (ref 6–20)
BUN/CREAT BLD: 26 (ref 9–20)
CALCIUM SERPL-MCNC: 9.2 MG/DL (ref 8.6–10.4)
CHLORIDE BLD-SCNC: 102 MMOL/L (ref 98–107)
CHOLESTEROL/HDL RATIO: 5.1
CHOLESTEROL: 189 MG/DL
CO2: 25 MMOL/L (ref 20–31)
CREAT SERPL-MCNC: 0.7 MG/DL (ref 0.7–1.2)
DIFFERENTIAL TYPE: NORMAL
EOSINOPHILS RELATIVE PERCENT: 2 % (ref 1–4)
FOLATE: 9.4 NG/ML
GFR AFRICAN AMERICAN: >60 ML/MIN
GFR NON-AFRICAN AMERICAN: >60 ML/MIN
GFR SERPL CREATININE-BSD FRML MDRD: ABNORMAL ML/MIN/{1.73_M2}
GFR SERPL CREATININE-BSD FRML MDRD: ABNORMAL ML/MIN/{1.73_M2}
GLUCOSE BLD-MCNC: 92 MG/DL (ref 70–99)
HCT VFR BLD CALC: 46.8 % (ref 41–53)
HDLC SERPL-MCNC: 37 MG/DL
HEMOGLOBIN: 15.9 G/DL (ref 13.5–17.5)
IMMATURE GRANULOCYTES: NORMAL %
LDL CHOLESTEROL: 91 MG/DL (ref 0–130)
LYMPHOCYTES # BLD: 28 % (ref 24–44)
MCH RBC QN AUTO: 30.6 PG (ref 26–34)
MCHC RBC AUTO-ENTMCNC: 34.1 G/DL (ref 31–37)
MCV RBC AUTO: 89.8 FL (ref 80–100)
MONOCYTES # BLD: 6 % (ref 1–7)
PDW BLD-RTO: 13 % (ref 11.5–14.5)
PLATELET # BLD: 243 K/UL (ref 130–400)
PLATELET ESTIMATE: NORMAL
PMV BLD AUTO: 8.5 FL (ref 6–12)
POTASSIUM SERPL-SCNC: 3.9 MMOL/L (ref 3.7–5.3)
RBC # BLD: 5.21 M/UL (ref 4.5–5.9)
RBC # BLD: NORMAL 10*6/UL
SEG NEUTROPHILS: 63 % (ref 36–66)
SEGMENTED NEUTROPHILS ABSOLUTE COUNT: 5.1 K/UL (ref 1.8–7.7)
SODIUM BLD-SCNC: 141 MMOL/L (ref 135–144)
TOTAL PROTEIN: 7.3 G/DL (ref 6.4–8.3)
TRIGL SERPL-MCNC: 305 MG/DL
TSH SERPL DL<=0.05 MIU/L-ACNC: 1.49 MIU/L (ref 0.3–5)
VITAMIN B-12: 647 PG/ML (ref 232–1245)
VLDLC SERPL CALC-MCNC: ABNORMAL MG/DL (ref 1–30)
WBC # BLD: 8 K/UL (ref 3.5–11)
WBC # BLD: NORMAL 10*3/UL

## 2018-01-11 PROCEDURE — 80061 LIPID PANEL: CPT

## 2018-01-11 PROCEDURE — 87389 HIV-1 AG W/HIV-1&-2 AB AG IA: CPT

## 2018-01-11 PROCEDURE — 80053 COMPREHEN METABOLIC PANEL: CPT

## 2018-01-11 PROCEDURE — 85025 COMPLETE CBC W/AUTO DIFF WBC: CPT

## 2018-01-11 PROCEDURE — 82746 ASSAY OF FOLIC ACID SERUM: CPT

## 2018-01-11 PROCEDURE — 82607 VITAMIN B-12: CPT

## 2018-01-11 PROCEDURE — 84443 ASSAY THYROID STIM HORMONE: CPT

## 2018-01-11 PROCEDURE — 36415 COLL VENOUS BLD VENIPUNCTURE: CPT

## 2018-01-12 LAB — HIV AG/AB: NONREACTIVE

## 2018-01-16 DIAGNOSIS — R20.2 PARESTHESIA OF LEFT UPPER EXTREMITY: ICD-10-CM

## 2018-02-01 ENCOUNTER — OFFICE VISIT (OUTPATIENT)
Dept: FAMILY MEDICINE CLINIC | Age: 38
End: 2018-02-01
Payer: MEDICARE

## 2018-02-01 VITALS
TEMPERATURE: 98 F | OXYGEN SATURATION: 96 % | DIASTOLIC BLOOD PRESSURE: 90 MMHG | WEIGHT: 231 LBS | BODY MASS INDEX: 30.62 KG/M2 | SYSTOLIC BLOOD PRESSURE: 132 MMHG | HEART RATE: 56 BPM | HEIGHT: 73 IN | RESPIRATION RATE: 16 BRPM

## 2018-02-01 DIAGNOSIS — E78.2 HYPERLIPIDEMIA, MIXED: ICD-10-CM

## 2018-02-01 DIAGNOSIS — M48.062 SPINAL STENOSIS OF LUMBAR REGION WITH NEUROGENIC CLAUDICATION: ICD-10-CM

## 2018-02-01 DIAGNOSIS — M54.42 CHRONIC BILATERAL LOW BACK PAIN WITH LEFT-SIDED SCIATICA: Primary | ICD-10-CM

## 2018-02-01 DIAGNOSIS — G89.29 CHRONIC BILATERAL LOW BACK PAIN WITH LEFT-SIDED SCIATICA: Primary | ICD-10-CM

## 2018-02-01 DIAGNOSIS — Z98.1 STATUS POST LUMBAR SPINAL FUSION: ICD-10-CM

## 2018-02-01 PROBLEM — J20.9 ACUTE BRONCHITIS: Status: RESOLVED | Noted: 2017-05-17 | Resolved: 2018-02-01

## 2018-02-01 PROBLEM — J06.9 UPPER RESPIRATORY TRACT INFECTION: Status: RESOLVED | Noted: 2017-06-22 | Resolved: 2018-02-01

## 2018-02-01 PROBLEM — H92.09 OTALGIA: Status: RESOLVED | Noted: 2017-06-22 | Resolved: 2018-02-01

## 2018-02-01 PROBLEM — N20.0 LEFT NEPHROLITHIASIS: Status: RESOLVED | Noted: 2017-05-05 | Resolved: 2018-02-01

## 2018-02-01 PROCEDURE — 99214 OFFICE O/P EST MOD 30 MIN: CPT | Performed by: FAMILY MEDICINE

## 2018-02-01 RX ORDER — GABAPENTIN 300 MG/1
300 CAPSULE ORAL NIGHTLY
Qty: 30 CAPSULE | Refills: 1 | Status: SHIPPED | OUTPATIENT
Start: 2018-02-01 | End: 2018-04-09 | Stop reason: SDUPTHER

## 2018-02-01 RX ORDER — ROSUVASTATIN CALCIUM 10 MG/1
10 TABLET, COATED ORAL DAILY
Qty: 30 TABLET | Refills: 2 | Status: SHIPPED | OUTPATIENT
Start: 2018-02-01 | End: 2018-05-01 | Stop reason: SDUPTHER

## 2018-02-01 ASSESSMENT — ENCOUNTER SYMPTOMS
SORE THROAT: 0
NAUSEA: 0
CONSTIPATION: 0
SHORTNESS OF BREATH: 0
DIARRHEA: 0
RHINORRHEA: 0
WHEEZING: 0
COUGH: 0
ABDOMINAL PAIN: 0
VOMITING: 0
BLOOD IN STOOL: 0
BACK PAIN: 1

## 2018-02-01 NOTE — PATIENT INSTRUCTIONS
cucumber, and onion to sandwiches. ¨ Combine a ready-made pizza crust with low-fat mozzarella cheese and lots of vegetable toppings. Try using tomatoes, squash, spinach, broccoli, carrots, cauliflower, and onions. ¨ Have a variety of cut-up vegetables with a low-fat dip as an appetizer instead of chips and dip. ¨ Sprinkle sunflower seeds or chopped almonds over salads. Or try adding chopped walnuts or almonds to cooked vegetables. ¨ Try some vegetarian meals using beans and peas. Add garbanzo or kidney beans to salads. Make burritos and tacos with mashed deutsch beans or black beans. Where can you learn more? Go to [email or web address] and sign in to your ClearStream account. Enter M715 in the PinkelStar box to learn more about \"DASH Diet: Care Instructions. \"     If you do not have an account, please click on the \"Sign Up Now\" link. Current as of: September 21, 2016  Content Version: 11.5  © 6853-4467 [employer]ent[employer]. Care instructions adapted under license by [facility] ([facility]). If you have questions about a medical condition or this instruction, always ask your healthcare professional. [address] any warranty or liability for your use of this information. Patient Education        High Cholesterol: Care Instructions  Your Care Instructions    Cholesterol is a type of fat in your blood. It is needed for many body functions, such as making new cells. Cholesterol is made by your body. It also comes from food you eat. High cholesterol means that you have too much of the fat in your blood. This raises your risk of a heart attack and stroke. LDL and HDL are part of your total cholesterol. LDL is the \"bad\" cholesterol. High LDL can raise your risk for heart disease, heart attack, and stroke. HDL is the \"good\" cholesterol. It helps clear bad cholesterol from the body. High HDL is linked with a lower risk of heart disease, heart attack, and stroke.   Your cholesterol levels help your doctor find out your risk for having a heart attack or stroke. You and your doctor can talk about whether you need to lower your risk and what treatment is best for you. A heart-healthy lifestyle along with medicines can help lower your cholesterol and your risk. The way you choose to lower your risk will depend on how high your risk is for heart attack and stroke. It will also depend on how you feel about taking medicines. Follow-up care is a key part of your treatment and safety. Be sure to make and go to all appointments, and call your doctor if you are having problems. It's also a good idea to know your test results and keep a list of the medicines you take. How can you care for yourself at home? · Eat a variety of foods every day. Good choices include fruits, vegetables, whole grains (like oatmeal), dried beans and peas, nuts and seeds, soy products (like tofu), and fat-free or low-fat dairy products. · Replace butter, margarine, and hydrogenated or partially hydrogenated oils with olive and canola oils. (Canola oil margarine without trans fat is fine.)  · Replace red meat with fish, poultry, and soy protein (like tofu). · Limit processed and packaged foods like chips, crackers, and cookies. · Bake, broil, or steam foods. Don't cuellar them. · Be physically active. Get at least 30 minutes of exercise on most days of the week. Walking is a good choice. You also may want to do other activities, such as running, swimming, cycling, or playing tennis or team sports. · Stay at a healthy weight or lose weight by making the changes in eating and physical activity listed above. Losing just a small amount of weight, even 5 to 10 pounds, can reduce your risk for having a heart attack or stroke. · Do not smoke. When should you call for help? Watch closely for changes in your health, and be sure to contact your doctor if:  ? · You need help making lifestyle changes.    ? · You have questions about your medicine. Where can you learn more? Go to [email or web address] and sign in to your Gleanster Researcht account. Enter X736 in the OpenROV box to learn more about \"High Cholesterol: Care Instructions. \"     If you do not have an account, please click on the \"Sign Up Now\" link. Current as of: September 21, 2016  Content Version: 11.5  © 4694-5083 RPO. Care instructions adapted under license by [facility] ([facility]). If you have questions about a medical condition or this instruction, always ask your healthcare professional. [address] any warranty or liability for your use of this information. Patient Education          rosuvastatin  Pronunciation:  rich INOCENTE va sta tin  Brand:  Crestor  What is the most important information I should know about rosuvastatin? This medicine can harm an unborn baby or cause birth defects. Do not use rosuvastatin if you are pregnant. You should not take rosuvastatin if you have liver disease, or if you breast-feeding a baby. What is rosuvastatin? Rosuvastatin is in a group of drugs called HMG CoA reductase inhibitors, or \"statins. \" Rosuvastatin reduces levels of \"bad\" cholesterol (low-density lipoprotein, or LDL) and triglycerides in the blood, while increasing levels of \"good\" cholesterol (high-density lipoprotein, or HDL). Rosuvastatin is used in adults and children who are at least 6years old, to lower cholesterol and triglycerides (types of fat) in the blood and to slow the build-up of plaque (fatty deposits) in blood vessels. Rosuvastatin is also used to lower the risk of stroke, heart attack, and other heart complications in certain people with diabetes, coronary heart disease, or other risk factors. Rosuvastatin is also used to treat hereditary forms of high cholesterol, including the heterozygous type (inherited from one parent) and the homozygous type (inherited from both parents). rosuvastatin can cause a condition that results in the breakdown of skeletal muscle tissue, leading to kidney failure. Call your doctor right away if you have unexplained muscle pain, tenderness, or weakness especially if you also have fever, unusual tiredness, and dark colored urine. Also call your doctor at once if you have:  · confusion, memory problems;  · liver problems --nausea, upper stomach pain, itching, tired feeling, loss of appetite, dark urine, dianne-colored stools, jaundice (yellowing of the skin or eyes); or  · signs of a kidney problem --little or no urinating; painful or difficult urination; swelling in your feet or ankles; feeling tired or short of breath. Common side effects may include:  · liver symptoms (stomach pain, dark urine, jaundice);  · unusual weakness or tired feeling;  · headache, muscle aches; or  · nausea, upset stomach. This is not a complete list of side effects and others may occur. Call your doctor for medical advice about side effects. You may report side effects to FDA at 0-487-FDA-7290. What other drugs will affect rosuvastatin? Using certain other drugs together with rosuvastatin can increase your risk of serious muscle problems. It is very important to tell your doctor about all medicines you use, and those you start or stop using during your treatment with rosuvastatin, especially:  · colchicine;  · cyclosporine;  · another \"statin\" medicine --atorvastatin, lovastatin, simvastatin, Lipitor, Pravachol, Zocor, Vytorin, and others;  · antifungal medicine --fluconazole, itraconazole, ketoconazole;  · antiviral medicine to treat HIV or hepatitis C --atazanavir, fosamprenavir, lopinavir, ritonavir, simeprevir, tipranavir, and others;  · a blood thinner --warfarin, Coumadin, Jantoven;  · other cholesterol medications --fenofibrate, gemfibrozil; or  · medicines that contain niacin or nicotinic acid --vitamin B3, Advicor, Niaspan, Niacor, Simcor, Slo-Niacin, and others.   This nurse or pharmacist.  Copyright 3341-4301 PaperV. Version: 11.03. Revision date: 6/28/2016. Care instructions adapted under license by [facility] [address] If you have questions about a medical condition or this instruction, always ask your healthcare professional. [address] any warranty or liability for your use of this information. Patient Education        Stopping Smoking: Care Instructions  Your Care Instructions    Cigarette smokers crave the nicotine in cigarettes. Giving it up is much harder than simply changing a habit. Your body has to stop craving the nicotine. It is hard to quit, but you can do it. There are many tools that people use to quit smoking. You may find that combining tools works best for you. There are several steps to quitting. First you get ready to quit. Then you get support to help you. After that, you learn new skills and behaviors to become a nonsmoker. For many people, a necessary step is getting and using medicine. Your doctor will help you set up the plan that best meets your needs. You may want to attend a smoking cessation program to help you quit smoking. When you choose a program, look for one that has proven success. Ask your doctor for ideas. You will greatly increase your chances of success if you take medicine as well as get counseling or join a cessation program.  Some of the changes you feel when you first quit tobacco are uncomfortable. Your body will miss the nicotine at first, and you may feel short-tempered and grumpy. You may have trouble sleeping or concentrating. Medicine can help you deal with these symptoms. You may struggle with changing your smoking habits and rituals. The last step is the tricky one: Be prepared for the smoking urge to continue for a time. This is a lot to deal with, but keep at it. You will feel better. Follow-up care is a key part of your treatment and safety.  Be sure to make and go to all appointments, withdrawal symptoms, such as stress and anxiety. · Some people find hypnosis, acupuncture, and massage helpful for ending the smoking habit. · Eat a healthy diet and get regular exercise. Having healthy habits will help your body move past its craving for nicotine. · Be prepared to keep trying. Most people are not successful the first few times they try to quit. Do not get mad at yourself if you smoke again. Make a list of things you learned and think about when you want to try again, such as next week, next month, or next year. Where can you learn more? Go to https://f-star [email or web address] [email or web address]. org and sign in to your MyPronostic account. Enter X332 in the Social Fabrics box to learn more about \"Stopping Smoking: Care Instructions. \"     If you do not have an account, please click on the \"Sign Up Now\" link. Current as of: March 20, 2017  Content Version: 11.5  © 8534-2120 Healthwise, [employer]Me. Care instructions adapted under license by [facility] ([facility]). If you have questions about a medical condition or this instruction, always ask your healthcare professional. [place] [address] any warranty or liability for your use of this information.

## 2018-02-01 NOTE — PROGRESS NOTES
has no wheezes. He has no rales. Abdominal: Soft. Bowel sounds are normal. He exhibits no distension and no mass. There is no tenderness. Musculoskeletal: He exhibits no edema (neg Homans'). Note the lumbar spine with ongoing tenderness, especially mid lumbar. Also note the ongoing chronic changes of bilateral shoulders. Lymphadenopathy:     He has no cervical adenopathy. Neurological: He is alert and oriented to person, place, and time. Note the ongoing numbness and weakness of the left upper extremity as previously. Skin: No rash noted. Vitals reviewed. Assessment:     1. Chronic bilateral low back pain with left-sided sciatica     2. Spinal stenosis of lumbar region with neurogenic claudication     3. Status post lumbar spinal fusion     4. Hyperlipidemia, mixed  Lipid Panel    ALT    AST       Plan:     Case thoroughly discussed with patient and proposed management and DDg. Patient Instructions     Continue Diet low salt, high fiber, avoiding concentrated sweets. Continue fluids (water based) regularly. Continue activity and exercise as tolerated. Continue present medications but note to increase the Neurontin to 300 mg nightly. Also to stop the pravastatin, and begin the Crestor 10 mg / day (generic) for the cholesterol. To get the recheck labs about 2/27/18, Call on test results after done if you do not hear from us in one week. Patient Education        DASH Diet: Care Instructions  Your Care Instructions    The DASH diet is an eating plan that can help lower your blood pressure. DASH stands for Dietary Approaches to Stop Hypertension. Hypertension is high blood pressure. The DASH diet focuses on eating foods that are high in calcium, potassium, and magnesium. These nutrients can lower blood pressure. The foods that are highest in these nutrients are fruits, vegetables, low-fat dairy products, nuts, seeds, and legumes.  But taking calcium, potassium, and under license by [facility] ([facility]). If you have questions about a medical condition or this instruction, always ask your healthcare professional. [address] any warranty or liability for your use of this information. Patient Education        High Cholesterol: Care Instructions  Your Care Instructions    Cholesterol is a type of fat in your blood. It is needed for many body functions, such as making new cells. Cholesterol is made by your body. It also comes from food you eat. High cholesterol means that you have too much of the fat in your blood. This raises your risk of a heart attack and stroke. LDL and HDL are part of your total cholesterol. LDL is the \"bad\" cholesterol. High LDL can raise your risk for heart disease, heart attack, and stroke. HDL is the \"good\" cholesterol. It helps clear bad cholesterol from the body. High HDL is linked with a lower risk of heart disease, heart attack, and stroke. Your cholesterol levels help your doctor find out your risk for having a heart attack or stroke. You and your doctor can talk about whether you need to lower your risk and what treatment is best for you. A heart-healthy lifestyle along with medicines can help lower your cholesterol and your risk. The way you choose to lower your risk will depend on how high your risk is for heart attack and stroke. It will also depend on how you feel about taking medicines. Follow-up care is a key part of your treatment and safety. Be sure to make and go to all appointments, and call your doctor if you are having problems. It's also a good idea to know your test results and keep a list of the medicines you take. How can you care for yourself at home? · Eat a variety of foods every day. Good choices include fruits, vegetables, whole grains (like oatmeal), dried beans and peas, nuts and seeds, soy products (like tofu), and fat-free or low-fat dairy products.   · Replace butter, margarine, and hydrogenated or have liver disease, or if you breast-feeding a baby. What is rosuvastatin? Rosuvastatin is in a group of drugs called HMG CoA reductase inhibitors, or \"statins. \" Rosuvastatin reduces levels of \"bad\" cholesterol (low-density lipoprotein, or LDL) and triglycerides in the blood, while increasing levels of \"good\" cholesterol (high-density lipoprotein, or HDL). Rosuvastatin is used in adults and children who are at least 6years old, to lower cholesterol and triglycerides (types of fat) in the blood and to slow the build-up of plaque (fatty deposits) in blood vessels. Rosuvastatin is also used to lower the risk of stroke, heart attack, and other heart complications in certain people with diabetes, coronary heart disease, or other risk factors. Rosuvastatin is also used to treat hereditary forms of high cholesterol, including the heterozygous type (inherited from one parent) and the homozygous type (inherited from both parents). For the heterozygous type, rosuvastatin can be used in children who are at least 6years old. For the homozygous type, rosuvastatin can be used in children as young as 9years old. Rosuvastatin may also be used for purposes not listed in this medication guide. What should I discuss with my healthcare provider before taking rosuvastatin? You should not take rosuvastatin if you are allergic to it, or if you have:  · liver disease; or  · if you are pregnant or breast-feeding. This medicine can harm an unborn baby or cause birth defects. Do not take rosuvastatin if you are pregnant. Stop taking this medication and tell your doctor right away if you become pregnant. Use effective birth control to avoid pregnancy while you are taking rosuvastatin. Rosuvastatin may pass into breast milk and could harm a nursing baby. Do not  breast-feed while you are taking rosuvastatin.   To make sure rosuvastatin is safe for you, tell your doctor if you have:  · a history of liver disease;  · a history of a medical condition or this instruction, always ask your healthcare professional. [place] [address] any warranty or liability for your use of this information. Orders Placed This Encounter   Procedures    Lipid Panel     Standing Status:   Future     Standing Expiration Date:   2/1/2019     Order Specific Question:   Is Patient Fasting?/# of Hours     Answer:   Yes - 12 hours    ALT     Standing Status:   Future     Standing Expiration Date:   2/2/2019    AST     Standing Status:   Future     Standing Expiration Date:   2/2/2019        Orders Placed This Encounter   Medications    rosuvastatin (CRESTOR) 10 MG tablet     Sig: Take 1 tablet by mouth daily     Dispense:  30 tablet     Refill:  2    gabapentin (NEURONTIN) 300 MG capsule     Sig: Take 1 capsule by mouth nightly for 60 days. Dispense:  30 capsule     Refill:  1       [place] received counseling on the following healthy behaviors: nutrition, exercise, medication adherence and tobacco cessation    Patient given educational materials on Hyperlipidemia, Smoking Cessation, Nutrition, Hypertension and meds. I have instructed [place] to complete a self tracking handout on Blood Pressures  and instructed them to bring it with them to his next appointment. Discussed use, benefit, and side effects of prescribed medications. Barriers to medication compliance addressed. All patient questions answered. Pt voiced understanding.

## 2018-03-29 ENCOUNTER — HOSPITAL ENCOUNTER (OUTPATIENT)
Age: 38
Discharge: HOME | End: 2018-03-29
Payer: MEDICARE

## 2018-03-29 DIAGNOSIS — E78.2 HYPERLIPIDEMIA, MIXED: ICD-10-CM

## 2018-03-29 LAB
ALT SERPL-CCNC: 22 U/L (ref 5–41)
AST SERPL-CCNC: 17 U/L
CHOLESTEROL/HDL RATIO: 4
CHOLESTEROL: 155 MG/DL
HDLC SERPL-MCNC: 39 MG/DL
LDL CHOLESTEROL: 62 MG/DL (ref 0–130)
TRIGL SERPL-MCNC: 269 MG/DL
VLDLC SERPL CALC-MCNC: ABNORMAL MG/DL (ref 1–30)

## 2018-03-29 PROCEDURE — 84460 ALANINE AMINO (ALT) (SGPT): CPT

## 2018-03-29 PROCEDURE — 84450 TRANSFERASE (AST) (SGOT): CPT

## 2018-03-29 PROCEDURE — 36415 COLL VENOUS BLD VENIPUNCTURE: CPT

## 2018-03-29 PROCEDURE — 80061 LIPID PANEL: CPT

## 2018-04-09 ENCOUNTER — OFFICE VISIT (OUTPATIENT)
Dept: FAMILY MEDICINE CLINIC | Age: 38
End: 2018-04-09
Payer: MEDICARE

## 2018-04-09 VITALS
BODY MASS INDEX: 31.54 KG/M2 | WEIGHT: 238 LBS | HEART RATE: 74 BPM | SYSTOLIC BLOOD PRESSURE: 123 MMHG | HEIGHT: 73 IN | RESPIRATION RATE: 16 BRPM | TEMPERATURE: 98.1 F | OXYGEN SATURATION: 97 % | DIASTOLIC BLOOD PRESSURE: 82 MMHG

## 2018-04-09 DIAGNOSIS — K22.2 GERD WITH STRICTURE: ICD-10-CM

## 2018-04-09 DIAGNOSIS — M25.571 ACUTE RIGHT ANKLE PAIN: Primary | ICD-10-CM

## 2018-04-09 DIAGNOSIS — G56.22 ULNAR NEUROPATHY AT ELBOW OF LEFT UPPER EXTREMITY: ICD-10-CM

## 2018-04-09 DIAGNOSIS — K21.9 GERD WITH STRICTURE: ICD-10-CM

## 2018-04-09 DIAGNOSIS — M48.062 SPINAL STENOSIS OF LUMBAR REGION WITH NEUROGENIC CLAUDICATION: ICD-10-CM

## 2018-04-09 DIAGNOSIS — E78.2 HYPERLIPIDEMIA, MIXED: ICD-10-CM

## 2018-04-09 DIAGNOSIS — K22.10 EROSIVE ESOPHAGITIS: ICD-10-CM

## 2018-04-09 PROCEDURE — 99214 OFFICE O/P EST MOD 30 MIN: CPT | Performed by: FAMILY MEDICINE

## 2018-04-09 RX ORDER — GABAPENTIN 300 MG/1
300 CAPSULE ORAL 2 TIMES DAILY
Qty: 60 CAPSULE | Refills: 1 | Status: SHIPPED | OUTPATIENT
Start: 2018-04-09 | End: 2018-05-31 | Stop reason: SDUPTHER

## 2018-04-09 RX ORDER — OMEPRAZOLE 20 MG/1
20 TABLET, DELAYED RELEASE ORAL DAILY
Qty: 30 TABLET | Refills: 5 | Status: SHIPPED | OUTPATIENT
Start: 2018-04-09 | End: 2018-11-05 | Stop reason: SDUPTHER

## 2018-04-09 ASSESSMENT — ENCOUNTER SYMPTOMS
WHEEZING: 0
CONSTIPATION: 0
NAUSEA: 0
SORE THROAT: 0
ABDOMINAL PAIN: 0
BLOOD IN STOOL: 0
VOMITING: 0
DIARRHEA: 0
RHINORRHEA: 0
SHORTNESS OF BREATH: 0
COUGH: 0

## 2018-04-10 ENCOUNTER — HOSPITAL ENCOUNTER (OUTPATIENT)
Age: 38
Discharge: HOME | End: 2018-04-12
Payer: MEDICARE

## 2018-04-10 ENCOUNTER — HOSPITAL ENCOUNTER (OUTPATIENT)
Dept: GENERAL RADIOLOGY | Age: 38
Discharge: HOME | End: 2018-04-12
Payer: MEDICARE

## 2018-04-10 DIAGNOSIS — M25.571 ACUTE RIGHT ANKLE PAIN: ICD-10-CM

## 2018-04-10 PROCEDURE — 73610 X-RAY EXAM OF ANKLE: CPT

## 2018-06-01 ENCOUNTER — OFFICE VISIT (OUTPATIENT)
Dept: FAMILY MEDICINE CLINIC | Age: 38
End: 2018-06-01
Payer: MEDICARE

## 2018-06-01 VITALS
OXYGEN SATURATION: 96 % | TEMPERATURE: 97.7 F | WEIGHT: 227 LBS | DIASTOLIC BLOOD PRESSURE: 68 MMHG | BODY MASS INDEX: 30.08 KG/M2 | RESPIRATION RATE: 16 BRPM | SYSTOLIC BLOOD PRESSURE: 119 MMHG | HEART RATE: 78 BPM

## 2018-06-01 DIAGNOSIS — B96.89 ACUTE BACTERIAL SINUSITIS: ICD-10-CM

## 2018-06-01 DIAGNOSIS — M25.571 ACUTE RIGHT ANKLE PAIN: Primary | ICD-10-CM

## 2018-06-01 DIAGNOSIS — J01.90 ACUTE BACTERIAL SINUSITIS: ICD-10-CM

## 2018-06-01 DIAGNOSIS — M19.071 OSTEOARTHRITIS OF RIGHT ANKLE AND FOOT: ICD-10-CM

## 2018-06-01 PROCEDURE — 99213 OFFICE O/P EST LOW 20 MIN: CPT | Performed by: FAMILY MEDICINE

## 2018-06-01 RX ORDER — AMOXICILLIN 500 MG/1
500 CAPSULE ORAL 3 TIMES DAILY
Qty: 30 CAPSULE | Refills: 0 | Status: SHIPPED | OUTPATIENT
Start: 2018-06-01 | End: 2018-06-11

## 2018-06-01 ASSESSMENT — PATIENT HEALTH QUESTIONNAIRE - PHQ9
SUM OF ALL RESPONSES TO PHQ QUESTIONS 1-9: 0
SUM OF ALL RESPONSES TO PHQ9 QUESTIONS 1 & 2: 0
2. FEELING DOWN, DEPRESSED OR HOPELESS: 0
1. LITTLE INTEREST OR PLEASURE IN DOING THINGS: 0

## 2018-06-01 ASSESSMENT — ENCOUNTER SYMPTOMS
DIARRHEA: 0
BLOOD IN STOOL: 0
WHEEZING: 0
NAUSEA: 0
COUGH: 1
ABDOMINAL PAIN: 0
VOMITING: 0
CONSTIPATION: 0
SHORTNESS OF BREATH: 0
SORE THROAT: 0
RHINORRHEA: 1

## 2018-06-19 ENCOUNTER — OFFICE VISIT (OUTPATIENT)
Dept: PODIATRY | Age: 38
End: 2018-06-19
Payer: MEDICARE

## 2018-06-19 VITALS — BODY MASS INDEX: 29.82 KG/M2 | WEIGHT: 225 LBS | HEIGHT: 73 IN

## 2018-06-19 DIAGNOSIS — M25.571 CHRONIC PAIN OF RIGHT ANKLE: ICD-10-CM

## 2018-06-19 DIAGNOSIS — G89.29 CHRONIC PAIN OF RIGHT ANKLE: ICD-10-CM

## 2018-06-19 DIAGNOSIS — S93.491A SPRAIN OF ANTERIOR TALOFIBULAR LIGAMENT OF RIGHT ANKLE, INITIAL ENCOUNTER: Primary | ICD-10-CM

## 2018-06-19 PROBLEM — J45.909 ASTHMA: Status: ACTIVE | Noted: 2017-07-17

## 2018-06-19 PROBLEM — J30.9 ATOPIC RHINITIS: Status: ACTIVE | Noted: 2017-07-17

## 2018-06-19 PROBLEM — F32.A DEPRESSION: Status: ACTIVE | Noted: 2017-07-17

## 2018-06-19 PROBLEM — G47.00 INSOMNIA: Status: ACTIVE | Noted: 2017-07-17

## 2018-06-19 PROCEDURE — 99203 OFFICE O/P NEW LOW 30 MIN: CPT | Performed by: PODIATRIST

## 2018-06-19 PROCEDURE — L1902 AFO ANKLE GAUNTLET PRE OTS: HCPCS | Performed by: PODIATRIST

## 2018-07-02 ENCOUNTER — OFFICE VISIT (OUTPATIENT)
Dept: PODIATRY | Age: 38
End: 2018-07-02

## 2018-07-02 DIAGNOSIS — S93.402D SPRAIN OF LIGAMENT OF LEFT ANKLE, SUBSEQUENT ENCOUNTER: Primary | ICD-10-CM

## 2018-07-03 RX ORDER — CELECOXIB 200 MG/1
CAPSULE ORAL
COMMUNITY
Start: 2018-06-26 | End: 2018-11-20 | Stop reason: SDUPTHER

## 2018-08-09 DIAGNOSIS — I10 ESSENTIAL HYPERTENSION: ICD-10-CM

## 2018-08-10 RX ORDER — METOPROLOL SUCCINATE 25 MG/1
TABLET, EXTENDED RELEASE ORAL
Qty: 60 TABLET | Refills: 5 | Status: SHIPPED | OUTPATIENT
Start: 2018-08-10 | End: 2018-11-20 | Stop reason: SDUPTHER

## 2018-08-18 DIAGNOSIS — I10 ESSENTIAL HYPERTENSION: ICD-10-CM

## 2018-08-22 RX ORDER — METOPROLOL SUCCINATE 25 MG/1
TABLET, EXTENDED RELEASE ORAL
Qty: 60 TABLET | Refills: 5 | OUTPATIENT
Start: 2018-08-22

## 2018-08-24 ENCOUNTER — HOSPITAL ENCOUNTER (OUTPATIENT)
Age: 38
Discharge: HOME | End: 2018-08-26
Payer: MEDICARE

## 2018-08-24 ENCOUNTER — HOSPITAL ENCOUNTER (OUTPATIENT)
Dept: GENERAL RADIOLOGY | Age: 38
Discharge: HOME | End: 2018-08-26
Payer: MEDICARE

## 2018-08-24 DIAGNOSIS — N20.0 KIDNEY STONE: ICD-10-CM

## 2018-08-24 PROCEDURE — 74018 RADEX ABDOMEN 1 VIEW: CPT

## 2018-09-04 ENCOUNTER — OFFICE VISIT (OUTPATIENT)
Dept: FAMILY MEDICINE CLINIC | Age: 38
End: 2018-09-04
Payer: MEDICARE

## 2018-09-04 VITALS
BODY MASS INDEX: 32.23 KG/M2 | SYSTOLIC BLOOD PRESSURE: 121 MMHG | DIASTOLIC BLOOD PRESSURE: 83 MMHG | HEIGHT: 72 IN | RESPIRATION RATE: 16 BRPM | OXYGEN SATURATION: 96 % | WEIGHT: 238 LBS | TEMPERATURE: 98 F | HEART RATE: 64 BPM

## 2018-09-04 DIAGNOSIS — I10 ESSENTIAL HYPERTENSION: ICD-10-CM

## 2018-09-04 DIAGNOSIS — G56.22 ULNAR NEUROPATHY AT ELBOW OF LEFT UPPER EXTREMITY: ICD-10-CM

## 2018-09-04 DIAGNOSIS — M24.20 LIGAMENT LAXITY: Primary | ICD-10-CM

## 2018-09-04 PROCEDURE — 99213 OFFICE O/P EST LOW 20 MIN: CPT | Performed by: FAMILY MEDICINE

## 2018-09-04 ASSESSMENT — ENCOUNTER SYMPTOMS
ABDOMINAL PAIN: 0
NAUSEA: 0
WHEEZING: 0
SORE THROAT: 0
BLOOD IN STOOL: 0
VOMITING: 0
COUGH: 0
SHORTNESS OF BREATH: 0
DIARRHEA: 0
CONSTIPATION: 0
BACK PAIN: 1
RHINORRHEA: 0

## 2018-09-04 NOTE — PROGRESS NOTES
Subjective:   9/4/2018    Denver A Colpaert is a 45 y.o. male  presents today with:      Chief Complaint   Patient presents with    Check-Up     discuss podiatry appt       HPI    Pt presents for follow up of the ankle pain, and podiatry consult results, as well as HTN, and note the left ulnar neuropathy. Pt relates that the podiatrist feels has loose ligament syndrome, of uncertain type, possible EDS, vs others. Notes the hypermobility of the ankles and feet. But pt also has more flexibility of the hands, etc.    Podiatry has Rx'ed ankle braces. But also note the ongoing problems with the knees, shoulders, etc.      So note the concern for systemic disease with this. Note still the nerve pain, tingling, and numbness of the left forearm / hand. Continues to need the gabapentin. He is trying to follow diet and exercise regularly. Pt taking medications as prescribed? Yes, as updated. Tolerated well? Yes.     Lab Results   Component Value Date     01/11/2018    K 3.9 01/11/2018     01/11/2018    CO2 25 01/11/2018    BUN 18 01/11/2018    CREATININE 0.70 01/11/2018    GLUCOSE 92 01/11/2018    CALCIUM 9.2 01/11/2018    PROT 7.3 01/11/2018    LABALBU 4.5 01/11/2018    BILITOT 0.80 01/11/2018    ALKPHOS 77 01/11/2018    AST 17 03/29/2018    ALT 22 03/29/2018    LABGLOM >60 01/11/2018    GFRAA >60 01/11/2018     Lab Results   Component Value Date    CHOL 155 03/29/2018     Lab Results   Component Value Date    TRIG 269 (H) 03/29/2018     Lab Results   Component Value Date    HDL 39 (L) 03/29/2018     Lab Results   Component Value Date    LDLCHOLESTEROL 62 03/29/2018     Lab Results   Component Value Date    VLDL NOT REPORTED 03/29/2018     Lab Results   Component Value Date    CHOLHDLRATIO 4.0 03/29/2018     Lab Results   Component Value Date    TSH 1.49 01/11/2018       Current Outpatient Prescriptions   Medication Sig Dispense Refill    gabapentin (NEURONTIN) 300 MG capsule take 1 Note review of PMH, PSH, PFH, social and immunizations. Past Medical History:   Diagnosis Date    Chronic back pain     DDD (degenerative disc disease)     Depression 7/17/2017    DJD (degenerative joint disease)     knees, ankles, spine    GERD (gastroesophageal reflux disease)     GERD with stricture 3/18/2015    Hyperlipidemia, mixed     Internal derangement of shoulder 5/21/2013    Left nephrolithiasis 5/5/2017    Left shoulder strain 5/21/2013    Radiculopathy     cervical, and lumbar     Past Surgical History:   Procedure Laterality Date    BACK SURGERY      L4-L5, L5-S1    CARPAL TUNNEL RELEASE Left 06/24/14    Dr. Scar De Leon Left 6/24/14    ulnar nerve relocation, Dr. William Harvey.  EMG Left 4/3/14    Upper Dr. Jasper Vega ARTHROSCOPY Left 2/1/13    Dr William Harvey, torn meniscus    LUMBAR FUSION  07/17/2017    L4-L5-S1 with rods, screws, and cages. Dr Janett Fernandez, [facility] NECK SURGERY      4-5 fusion    NERVE SURGERY Left 11/24/15    Dr Zachary Lopez, left ulnar nerve transposition, additional correction.  SHOULDER SURGERY      x2 right shoulder, x2 left    WISDOM TOOTH EXTRACTION      x4     Family History   Problem Relation Age of Onset    High Blood Pressure Mother     Thyroid Disease Mother     Heart Disease Father         MI X4, since age 48.  Thyroid Disease Father     Bipolar Disorder Brother     Arthritis Brother      Social History     Social History    Marital status:      Spouse name: N/A    Number of children: N/A    Years of education: N/A     Occupational History    Not on file. Social History Main Topics    Smoking status: Current Every Day Smoker     Packs/day: 0.50     Years: 22.00     Types: Cigarettes    Smokeless tobacco: Current User     Types: Chew      Comment: trying to, is down to the 1/2 pack daily from 1. to start the Chantix soon, note also trying vaping.      Alcohol use No Neurological: He is alert and oriented to person, place, and time. Grossly unchanged. Note the decreased sensation of the left upper extremity     Skin: No rash noted. Vitals reviewed. Assessment:      Diagnosis Orders   1. Ligament laxity  [facility]   2. Essential hypertension     3. Ulnar neuropathy at elbow of left upper extremity         Plan:     Case thoroughly discussed with patient and proposed management and DDg. Patient Instructions   Continue Diet low salt, high fiber, avoiding concentrated sweets. Continue fluids (water based) regularly. Continue activity and exercise as tolerated. Continue present medications as ordered. Note the referral for genetics counseling, and possibly from there for genetic testing as indicated. Note election to hold off additional labs for now. Will need yearly update by next January. To get flu soon. Patient Education        DASH Diet: Care Instructions  Your Care Instructions    The DASH diet is an eating plan that can help lower your blood pressure. DASH stands for Dietary Approaches to Stop Hypertension. Hypertension is high blood pressure. The DASH diet focuses on eating foods that are high in calcium, potassium, and magnesium. These nutrients can lower blood pressure. The foods that are highest in these nutrients are fruits, vegetables, low-fat dairy products, nuts, seeds, and legumes. But taking calcium, potassium, and magnesium supplements instead of eating foods that are high in those nutrients does not have the same effect. The DASH diet also includes whole grains, fish, and poultry. The DASH diet is one of several lifestyle changes your doctor may recommend to lower your high blood pressure. Your doctor may also want you to decrease the amount of sodium in your diet. Lowering sodium while following the DASH diet can lower blood pressure even further than just the DASH diet alone.   Follow-up on your favorite foods and then be more likely to not follow the plan. Make small changes, and stick with them. Once those changes become habit, add a few more changes. · Try some of the following:  ¨ Make it a goal to eat a fruit or vegetable at every meal and at snacks. This will make it easy to get the recommended amount of fruits and vegetables each day. ¨ Try yogurt topped with fruit and nuts for a snack or healthy dessert. ¨ Add lettuce, tomato, cucumber, and onion to sandwiches. ¨ Combine a ready-made pizza crust with low-fat mozzarella cheese and lots of vegetable toppings. Try using tomatoes, squash, spinach, broccoli, carrots, cauliflower, and onions. ¨ Have a variety of cut-up vegetables with a low-fat dip as an appetizer instead of chips and dip. ¨ Sprinkle sunflower seeds or chopped almonds over salads. Or try adding chopped walnuts or almonds to cooked vegetables. ¨ Try some vegetarian meals using beans and peas. Add garbanzo or kidney beans to salads. Make burritos and tacos with mashed deutsch beans or black beans. Where can you learn more? Go to https://ONtheAIR.Optosecurity. org and sign in to your [employer] account. Enter Y836 in the [facility] box to learn more about \"DASH Diet: Care Instructions. \"     If you do not have an account, please click on the \"Sign Up Now\" link. Current as of: December 6, 2017  Content Version: 11.7  © 7458-8989 [employer]. Care instructions adapted under license by [facility] [address] If you have questions about a medical condition or this instruction, always ask your healthcare professional. [place] [address] any warranty or liability for your use of this information.            Orders Placed This Encounter   Procedures   [address], [place], [facility]     Referral Priority:   Routine     Referral Type:   Eval and Treat     Referral Reason:   Specialty Services Required     Referred to Provider: Brady Summers     Requested Specialty:   Genetic Counselor     Number of Visits Requested:   1            Denver received counseling on the following healthy behaviors: nutrition, exercise and medication adherence    Patient given educational materials on Nutrition and Hypertension    I have instructed Denver to complete a self tracking handout on Blood Pressures  and instructed them to bring it with them to his next appointment. Discussed use, benefit, and side effects of prescribed medications. Barriers to medication compliance addressed. All patient questions answered. Pt voiced understanding.

## 2018-11-05 DIAGNOSIS — K22.2 GERD WITH STRICTURE: ICD-10-CM

## 2018-11-05 DIAGNOSIS — K21.9 GERD WITH STRICTURE: ICD-10-CM

## 2018-11-05 DIAGNOSIS — K22.10 EROSIVE ESOPHAGITIS: ICD-10-CM

## 2018-11-05 RX ORDER — ROSUVASTATIN CALCIUM 10 MG/1
TABLET, COATED ORAL
Qty: 30 TABLET | Refills: 0 | Status: SHIPPED | OUTPATIENT
Start: 2018-11-05 | End: 2018-11-20 | Stop reason: SDUPTHER

## 2018-11-05 RX ORDER — OMEPRAZOLE 20 MG/1
20 TABLET, DELAYED RELEASE ORAL DAILY
Qty: 30 TABLET | Refills: 0 | Status: SHIPPED | OUTPATIENT
Start: 2018-11-05 | End: 2018-11-20

## 2018-11-18 PROBLEM — H69.80 EUSTACHIAN TUBE DYSFUNCTION: Status: RESOLVED | Noted: 2017-06-22 | Resolved: 2018-11-18

## 2018-11-18 PROBLEM — M79.605 LEFT LEG PAIN: Status: RESOLVED | Noted: 2017-05-05 | Resolved: 2018-11-18

## 2018-11-18 PROBLEM — M24.20 LIGAMENT LAXITY: Status: RESOLVED | Noted: 2018-09-04 | Resolved: 2018-11-18

## 2018-11-18 PROBLEM — J45.909 ASTHMA: Status: RESOLVED | Noted: 2017-07-17 | Resolved: 2018-11-18

## 2018-11-18 PROBLEM — N13.9 OBSTRUCTION TO URINARY OUTFLOW: Status: ACTIVE | Noted: 2018-11-18

## 2018-11-18 PROBLEM — H69.90 EUSTACHIAN TUBE DYSFUNCTION: Status: RESOLVED | Noted: 2017-06-22 | Resolved: 2018-11-18

## 2018-11-18 PROBLEM — M99.03 SOMATIC DYSFUNCTION OF SPINE, LUMBAR: Status: RESOLVED | Noted: 2017-05-05 | Resolved: 2018-11-18

## 2018-11-18 PROBLEM — Z98.1 STATUS POST LUMBAR SPINAL FUSION: Status: RESOLVED | Noted: 2017-10-27 | Resolved: 2018-11-18

## 2018-11-18 PROBLEM — J30.9 ATOPIC RHINITIS: Status: RESOLVED | Noted: 2017-07-17 | Resolved: 2018-11-18

## 2018-11-18 PROBLEM — F32.A DEPRESSION: Status: RESOLVED | Noted: 2017-07-17 | Resolved: 2018-11-18

## 2018-11-20 ENCOUNTER — OFFICE VISIT (OUTPATIENT)
Dept: FAMILY MEDICINE CLINIC | Age: 38
End: 2018-11-20
Payer: MEDICARE

## 2018-11-20 VITALS
HEART RATE: 73 BPM | DIASTOLIC BLOOD PRESSURE: 77 MMHG | WEIGHT: 242.2 LBS | SYSTOLIC BLOOD PRESSURE: 114 MMHG | RESPIRATION RATE: 16 BRPM | HEIGHT: 72 IN | OXYGEN SATURATION: 97 % | BODY MASS INDEX: 32.8 KG/M2

## 2018-11-20 DIAGNOSIS — Z87.891 PERSONAL HISTORY OF NICOTINE DEPENDENCE: ICD-10-CM

## 2018-11-20 DIAGNOSIS — G47.30 SLEEP APNEA, UNSPECIFIED TYPE: ICD-10-CM

## 2018-11-20 DIAGNOSIS — J45.20 MILD INTERMITTENT ASTHMA WITHOUT COMPLICATION: ICD-10-CM

## 2018-11-20 DIAGNOSIS — M96.1 FAILED BACK SYNDROME OF LUMBAR SPINE: ICD-10-CM

## 2018-11-20 DIAGNOSIS — J30.89 PERENNIAL ALLERGIC RHINITIS WITH SEASONAL VARIATION: ICD-10-CM

## 2018-11-20 DIAGNOSIS — I10 ESSENTIAL HYPERTENSION: ICD-10-CM

## 2018-11-20 DIAGNOSIS — E78.5 HYPERLIPIDEMIA, UNSPECIFIED HYPERLIPIDEMIA TYPE: Primary | ICD-10-CM

## 2018-11-20 DIAGNOSIS — K22.2 GERD WITH STRICTURE: ICD-10-CM

## 2018-11-20 DIAGNOSIS — M54.42 CHRONIC BILATERAL LOW BACK PAIN WITH LEFT-SIDED SCIATICA: ICD-10-CM

## 2018-11-20 DIAGNOSIS — N13.9 OBSTRUCTION TO URINARY OUTFLOW: ICD-10-CM

## 2018-11-20 DIAGNOSIS — G89.29 CHRONIC BILATERAL LOW BACK PAIN WITH LEFT-SIDED SCIATICA: ICD-10-CM

## 2018-11-20 DIAGNOSIS — Z76.89 ESTABLISHING CARE WITH NEW DOCTOR, ENCOUNTER FOR: ICD-10-CM

## 2018-11-20 DIAGNOSIS — M48.062 SPINAL STENOSIS OF LUMBAR REGION WITH NEUROGENIC CLAUDICATION: ICD-10-CM

## 2018-11-20 DIAGNOSIS — F41.8 DEPRESSION WITH ANXIETY: ICD-10-CM

## 2018-11-20 DIAGNOSIS — G56.22 ULNAR NEUROPATHY AT ELBOW OF LEFT UPPER EXTREMITY: ICD-10-CM

## 2018-11-20 DIAGNOSIS — R06.83 SNORING: ICD-10-CM

## 2018-11-20 DIAGNOSIS — K21.9 GASTROESOPHAGEAL REFLUX DISEASE, ESOPHAGITIS PRESENCE NOT SPECIFIED: ICD-10-CM

## 2018-11-20 DIAGNOSIS — J30.2 PERENNIAL ALLERGIC RHINITIS WITH SEASONAL VARIATION: ICD-10-CM

## 2018-11-20 DIAGNOSIS — K21.9 GERD WITH STRICTURE: ICD-10-CM

## 2018-11-20 DIAGNOSIS — K22.10 EROSIVE ESOPHAGITIS: ICD-10-CM

## 2018-11-20 PROBLEM — G47.00 INSOMNIA: Status: RESOLVED | Noted: 2017-07-17 | Resolved: 2018-11-20

## 2018-11-20 PROBLEM — R59.0 RETROPERITONEAL LYMPHADENOPATHY: Status: RESOLVED | Noted: 2017-06-01 | Resolved: 2018-11-20

## 2018-11-20 PROCEDURE — 99406 BEHAV CHNG SMOKING 3-10 MIN: CPT | Performed by: FAMILY MEDICINE

## 2018-11-20 PROCEDURE — 99214 OFFICE O/P EST MOD 30 MIN: CPT | Performed by: FAMILY MEDICINE

## 2018-11-20 RX ORDER — GABAPENTIN 300 MG/1
CAPSULE ORAL
Qty: 180 CAPSULE | Refills: 3 | Status: SHIPPED | OUTPATIENT
Start: 2018-11-20 | End: 2019-11-03 | Stop reason: SDUPTHER

## 2018-11-20 RX ORDER — BUPROPION HYDROCHLORIDE 150 MG/1
150 TABLET, EXTENDED RELEASE ORAL 2 TIMES DAILY
Qty: 60 TABLET | Refills: 3 | Status: SHIPPED | OUTPATIENT
Start: 2018-11-20 | End: 2019-01-21 | Stop reason: SDUPTHER

## 2018-11-20 RX ORDER — ROSUVASTATIN CALCIUM 10 MG/1
TABLET, COATED ORAL
Qty: 90 TABLET | Refills: 3 | Status: SHIPPED | OUTPATIENT
Start: 2018-11-20 | End: 2019-11-03 | Stop reason: SDUPTHER

## 2018-11-20 RX ORDER — ESCITALOPRAM OXALATE 10 MG/1
TABLET ORAL
Qty: 90 TABLET | Refills: 3 | Status: SHIPPED | OUTPATIENT
Start: 2018-11-20 | End: 2019-11-03 | Stop reason: SDUPTHER

## 2018-11-20 RX ORDER — CELECOXIB 200 MG/1
200 CAPSULE ORAL DAILY
Qty: 90 CAPSULE | Refills: 3 | Status: SHIPPED | OUTPATIENT
Start: 2018-11-20 | End: 2019-11-03 | Stop reason: SDUPTHER

## 2018-11-20 RX ORDER — OMEPRAZOLE 20 MG/1
20 CAPSULE, DELAYED RELEASE ORAL DAILY
Qty: 90 CAPSULE | Refills: 3 | Status: SHIPPED | OUTPATIENT
Start: 2018-11-20 | End: 2019-11-03 | Stop reason: SDUPTHER

## 2018-11-20 RX ORDER — BUPROPION HYDROCHLORIDE 150 MG/1
150 TABLET, EXTENDED RELEASE ORAL 2 TIMES DAILY
Qty: 60 TABLET | Refills: 3 | Status: SHIPPED | OUTPATIENT
Start: 2018-11-20 | End: 2018-11-20 | Stop reason: SDUPTHER

## 2018-11-20 RX ORDER — TAMSULOSIN HYDROCHLORIDE 0.4 MG/1
0.4 CAPSULE ORAL DAILY
COMMUNITY
End: 2019-09-19 | Stop reason: ALTCHOICE

## 2018-11-20 RX ORDER — METOPROLOL SUCCINATE 50 MG/1
TABLET, EXTENDED RELEASE ORAL
Qty: 90 TABLET | Refills: 3 | Status: SHIPPED | OUTPATIENT
Start: 2018-11-20 | End: 2019-11-03 | Stop reason: SDUPTHER

## 2018-11-20 ASSESSMENT — ENCOUNTER SYMPTOMS
BLOOD IN STOOL: 0
EYE PAIN: 0
SHORTNESS OF BREATH: 0

## 2018-11-20 NOTE — PROGRESS NOTES
Bill French MD  [facility]YSICIANS  COMPREHENSIVE CARE  [facility] [address] [address]  Dept: [phone]    Denver Columbus Lamb is a 45 y.o. male who presents today for hismedical conditions/complaints as noted below.   Livia Lim is here today c/o Establish Care ((fuentes))       HPI:     HPI    Here to establish care   On disability   , has 1 daughter    HTN, on metoprolol, Not monitoring blood pressure at home, no chest pain or dyspnea  HLD, on Crestor  Asthma, on albuterol, breathing well controlled  Cardiac dysrhythmia - PAC's, had stress test that he states was unremarkable   GERD and erosive esophagitis, on omeprazole as needed  Chronic back pain, failed back surgery, on gabapentin, Celebrex, Flexeril, saw orthopedics Dr. Braswell Older: conservative rx  Retroperitoneal lymphadenopathy on CT 2017, likely reactive lymph nodes, declined repeat CT  Depression and anxiety, on Lexapro, mood well controlled  Hypermobility of the ankles, sees podiatry, given ankle braces  Obstructive urination, sees urology, started on Flomax, stream is better, has follow-up next month  Smokes 1/2 ppd, tried chantix in the past but got abdominal pain on it, interested in quitting  Snores, gasps for air at night, fatigued, wakes up feeling exhausted, needs sleep study    Due for labs in January    Patient Active Problem List   Diagnosis    Herpes simplex    Migraine    Hyperlipidemia    Perennial allergic rhinitis with seasonal variation    CTS (carpal tunnel syndrome)    Cardiac dysrhythmia - PAC's    Depression with anxiety    GERD (gastroesophageal reflux disease)    Erosive esophagitis    Essential hypertension    Family history of early CAD    Mild intermittent asthma without complication    Failed back syndrome of lumbar spine    Chronic bilateral low back pain with left-sided sciatica    Spinal stenosis of lumbar region with neurogenic claudication    Insomnia    Obstruction

## 2019-01-21 ENCOUNTER — OFFICE VISIT (OUTPATIENT)
Dept: FAMILY MEDICINE CLINIC | Age: 39
End: 2019-01-21
Payer: MEDICARE

## 2019-01-21 VITALS
DIASTOLIC BLOOD PRESSURE: 75 MMHG | BODY MASS INDEX: 33.36 KG/M2 | HEART RATE: 78 BPM | OXYGEN SATURATION: 97 % | WEIGHT: 246 LBS | TEMPERATURE: 97.6 F | SYSTOLIC BLOOD PRESSURE: 122 MMHG

## 2019-01-21 DIAGNOSIS — E66.09 CLASS 1 OBESITY DUE TO EXCESS CALORIES WITHOUT SERIOUS COMORBIDITY WITH BODY MASS INDEX (BMI) OF 33.0 TO 33.9 IN ADULT: ICD-10-CM

## 2019-01-21 DIAGNOSIS — I10 ESSENTIAL HYPERTENSION: ICD-10-CM

## 2019-01-21 DIAGNOSIS — Z00.00 ROUTINE GENERAL MEDICAL EXAMINATION AT A HEALTH CARE FACILITY: Primary | ICD-10-CM

## 2019-01-21 DIAGNOSIS — E78.5 HYPERLIPIDEMIA, UNSPECIFIED HYPERLIPIDEMIA TYPE: ICD-10-CM

## 2019-01-21 DIAGNOSIS — Z87.891 PERSONAL HISTORY OF NICOTINE DEPENDENCE: ICD-10-CM

## 2019-01-21 PROCEDURE — G0438 PPPS, INITIAL VISIT: HCPCS | Performed by: FAMILY MEDICINE

## 2019-01-21 PROCEDURE — 99213 OFFICE O/P EST LOW 20 MIN: CPT | Performed by: FAMILY MEDICINE

## 2019-01-21 PROCEDURE — 99406 BEHAV CHNG SMOKING 3-10 MIN: CPT | Performed by: FAMILY MEDICINE

## 2019-01-21 RX ORDER — BUPROPION HYDROCHLORIDE 150 MG/1
150 TABLET, EXTENDED RELEASE ORAL 2 TIMES DAILY
Qty: 180 TABLET | Refills: 3 | Status: SHIPPED | OUTPATIENT
Start: 2019-01-21 | End: 2020-02-14

## 2019-01-21 ASSESSMENT — ENCOUNTER SYMPTOMS
EYE PAIN: 0
BLOOD IN STOOL: 0
SHORTNESS OF BREATH: 0

## 2019-01-25 ENCOUNTER — HOSPITAL ENCOUNTER (OUTPATIENT)
Age: 39
Discharge: HOME | End: 2019-01-25
Payer: MEDICARE

## 2019-01-25 DIAGNOSIS — I10 ESSENTIAL HYPERTENSION: ICD-10-CM

## 2019-01-25 DIAGNOSIS — E78.5 HYPERLIPIDEMIA, UNSPECIFIED HYPERLIPIDEMIA TYPE: ICD-10-CM

## 2019-01-25 DIAGNOSIS — Z00.00 ROUTINE GENERAL MEDICAL EXAMINATION AT A HEALTH CARE FACILITY: ICD-10-CM

## 2019-01-25 LAB
ABSOLUTE EOS #: 0.18 K/UL (ref 0–0.44)
ABSOLUTE IMMATURE GRANULOCYTE: 0.08 K/UL (ref 0–0.3)
ABSOLUTE LYMPH #: 2.24 K/UL (ref 1.1–3.7)
ABSOLUTE MONO #: 0.58 K/UL (ref 0.1–1.2)
ALBUMIN SERPL-MCNC: 4.5 G/DL (ref 3.5–5.2)
ALBUMIN/GLOBULIN RATIO: 1.7 (ref 1–2.5)
ALP BLD-CCNC: 61 U/L (ref 40–129)
ALT SERPL-CCNC: 23 U/L (ref 5–41)
ANION GAP SERPL CALCULATED.3IONS-SCNC: 20 MMOL/L (ref 9–17)
AST SERPL-CCNC: 17 U/L
BASOPHILS # BLD: 1 % (ref 0–2)
BASOPHILS ABSOLUTE: 0.07 K/UL (ref 0–0.2)
BILIRUB SERPL-MCNC: 0.64 MG/DL (ref 0.3–1.2)
BUN BLDV-MCNC: 19 MG/DL (ref 6–20)
BUN/CREAT BLD: ABNORMAL (ref 9–20)
CALCIUM SERPL-MCNC: 9.4 MG/DL (ref 8.6–10.4)
CHLORIDE BLD-SCNC: 103 MMOL/L (ref 98–107)
CHOLESTEROL/HDL RATIO: 3.4
CHOLESTEROL: 134 MG/DL
CO2: 23 MMOL/L (ref 20–31)
CREAT SERPL-MCNC: 0.82 MG/DL (ref 0.7–1.2)
DIFFERENTIAL TYPE: ABNORMAL
EOSINOPHILS RELATIVE PERCENT: 2 % (ref 1–4)
ESTIMATED AVERAGE GLUCOSE: 105 MG/DL
GFR AFRICAN AMERICAN: >60 ML/MIN
GFR NON-AFRICAN AMERICAN: >60 ML/MIN
GFR SERPL CREATININE-BSD FRML MDRD: ABNORMAL ML/MIN/{1.73_M2}
GFR SERPL CREATININE-BSD FRML MDRD: ABNORMAL ML/MIN/{1.73_M2}
GLUCOSE BLD-MCNC: 96 MG/DL (ref 70–99)
HBA1C MFR BLD: 5.3 % (ref 4–6)
HCT VFR BLD CALC: 45.4 % (ref 40.7–50.3)
HDLC SERPL-MCNC: 39 MG/DL
HEMOGLOBIN: 15.3 G/DL (ref 13–17)
IMMATURE GRANULOCYTES: 1 %
LDL CHOLESTEROL: 51 MG/DL (ref 0–130)
LYMPHOCYTES # BLD: 27 % (ref 24–43)
MCH RBC QN AUTO: 30.7 PG (ref 25.2–33.5)
MCHC RBC AUTO-ENTMCNC: 33.7 G/DL (ref 28.4–34.8)
MCV RBC AUTO: 91.2 FL (ref 82.6–102.9)
MONOCYTES # BLD: 7 % (ref 3–12)
NRBC AUTOMATED: 0 PER 100 WBC
PDW BLD-RTO: 12.4 % (ref 11.8–14.4)
PLATELET # BLD: 214 K/UL (ref 138–453)
PLATELET ESTIMATE: ABNORMAL
PMV BLD AUTO: 10.8 FL (ref 8.1–13.5)
POTASSIUM SERPL-SCNC: 3.9 MMOL/L (ref 3.7–5.3)
RBC # BLD: 4.98 M/UL (ref 4.21–5.77)
RBC # BLD: ABNORMAL 10*6/UL
SEG NEUTROPHILS: 62 % (ref 36–65)
SEGMENTED NEUTROPHILS ABSOLUTE COUNT: 5.16 K/UL (ref 1.5–8.1)
SODIUM BLD-SCNC: 146 MMOL/L (ref 135–144)
TOTAL PROTEIN: 7.1 G/DL (ref 6.4–8.3)
TRIGL SERPL-MCNC: 218 MG/DL
VLDLC SERPL CALC-MCNC: ABNORMAL MG/DL (ref 1–30)
WBC # BLD: 8.3 K/UL (ref 3.5–11.3)
WBC # BLD: ABNORMAL 10*3/UL

## 2019-01-25 PROCEDURE — 80061 LIPID PANEL: CPT

## 2019-01-25 PROCEDURE — 36415 COLL VENOUS BLD VENIPUNCTURE: CPT

## 2019-01-25 PROCEDURE — 80053 COMPREHEN METABOLIC PANEL: CPT

## 2019-01-25 PROCEDURE — 85025 COMPLETE CBC W/AUTO DIFF WBC: CPT

## 2019-01-25 PROCEDURE — 83036 HEMOGLOBIN GLYCOSYLATED A1C: CPT

## 2019-03-16 ENCOUNTER — HOSPITAL ENCOUNTER (OUTPATIENT)
Dept: GENERAL RADIOLOGY | Age: 39
Discharge: HOME | End: 2019-03-18
Payer: MEDICARE

## 2019-03-16 ENCOUNTER — HOSPITAL ENCOUNTER (OUTPATIENT)
Age: 39
Discharge: HOME | End: 2019-03-18
Payer: MEDICARE

## 2019-03-16 DIAGNOSIS — N20.0 CALCULUS OF KIDNEY: ICD-10-CM

## 2019-03-16 PROCEDURE — 74018 RADEX ABDOMEN 1 VIEW: CPT

## 2019-05-17 RX ORDER — FLUTICASONE PROPIONATE 50 MCG
1 SPRAY, SUSPENSION (ML) NASAL DAILY
Qty: 1 BOTTLE | Refills: 11 | Status: SHIPPED | OUTPATIENT
Start: 2019-05-17 | End: 2019-06-27 | Stop reason: SDUPTHER

## 2019-06-27 ENCOUNTER — OFFICE VISIT (OUTPATIENT)
Dept: FAMILY MEDICINE CLINIC | Age: 39
End: 2019-06-27
Payer: MEDICARE

## 2019-06-27 VITALS
RESPIRATION RATE: 16 BRPM | DIASTOLIC BLOOD PRESSURE: 93 MMHG | OXYGEN SATURATION: 98 % | BODY MASS INDEX: 33.08 KG/M2 | WEIGHT: 244.2 LBS | HEIGHT: 72 IN | HEART RATE: 96 BPM | SYSTOLIC BLOOD PRESSURE: 131 MMHG

## 2019-06-27 DIAGNOSIS — E66.9 OBESITY (BMI 30-39.9): ICD-10-CM

## 2019-06-27 DIAGNOSIS — Z87.891 PERSONAL HISTORY OF NICOTINE DEPENDENCE: ICD-10-CM

## 2019-06-27 DIAGNOSIS — R03.0 ELEVATED BP WITHOUT DIAGNOSIS OF HYPERTENSION: ICD-10-CM

## 2019-06-27 DIAGNOSIS — H66.92 LEFT OTITIS MEDIA, UNSPECIFIED OTITIS MEDIA TYPE: Primary | ICD-10-CM

## 2019-06-27 PROCEDURE — 99214 OFFICE O/P EST MOD 30 MIN: CPT | Performed by: FAMILY MEDICINE

## 2019-06-27 PROCEDURE — 99406 BEHAV CHNG SMOKING 3-10 MIN: CPT | Performed by: FAMILY MEDICINE

## 2019-06-27 RX ORDER — FLUTICASONE PROPIONATE 50 MCG
1 SPRAY, SUSPENSION (ML) NASAL DAILY
Qty: 3 BOTTLE | Refills: 3 | Status: SHIPPED | OUTPATIENT
Start: 2019-06-27 | End: 2020-04-20 | Stop reason: SDUPTHER

## 2019-06-27 RX ORDER — AMOXICILLIN 500 MG/1
500 CAPSULE ORAL 3 TIMES DAILY
Qty: 30 CAPSULE | Refills: 0 | Status: SHIPPED | OUTPATIENT
Start: 2019-06-27 | End: 2019-07-07

## 2019-06-27 ASSESSMENT — PATIENT HEALTH QUESTIONNAIRE - PHQ9
1. LITTLE INTEREST OR PLEASURE IN DOING THINGS: 0
SUM OF ALL RESPONSES TO PHQ QUESTIONS 1-9: 0
SUM OF ALL RESPONSES TO PHQ QUESTIONS 1-9: 0
SUM OF ALL RESPONSES TO PHQ9 QUESTIONS 1 & 2: 0
2. FEELING DOWN, DEPRESSED OR HOPELESS: 0

## 2019-06-27 ASSESSMENT — ENCOUNTER SYMPTOMS
VOMITING: 0
COUGH: 0
EYE PAIN: 0
BLOOD IN STOOL: 0
SORE THROAT: 0
DIARRHEA: 0
SHORTNESS OF BREATH: 0
RHINORRHEA: 0

## 2019-06-27 NOTE — PROGRESS NOTES
Taiwo Lal MD  [address] [address]  Dept: [phone]    Yoselin Lima is a 44 y.o. male who presents today for hismedical conditions/complaints as noted below. Yoselin Lima is here today c/o Otalgia       HPI:     Otalgia    There is pain in the left ear. This is a new problem. There has been no fever. Associated symptoms include headaches and hearing loss. Pertinent negatives include no coughing, diarrhea, ear discharge, rhinorrhea, sore throat or vomiting. There is no history of a chronic ear infection. 3 week history of URI symptoms, endorses L ear pain, sinus pressure - improving, no fevers, some dizziness for past 4 day, no tinnitus  Using claritin, flonase and sinus since  No GI sx    Smoking, on Zyban, has only had 2 cigarettes since the last visit, no longer has cravings for cigarettes    Lost 2 lbs since the last visit, using air fryer more now, has not reduced his portion sizes  Wt Readings from Last 3 Encounters:   06/27/19 244 lb 3.2 oz (110.8 kg)   01/21/19 246 lb (111.6 kg)   11/20/18 242 lb 3.2 oz (109.9 kg)     BP Readings from Last 3 Encounters:   06/27/19 (!) 131/93   01/21/19 122/75   11/20/18 114/77       Patient Active Problem List   Diagnosis    Herpes simplex    Migraine    Hyperlipidemia    Perennial allergic rhinitis with seasonal variation    CTS (carpal tunnel syndrome)    Cardiac dysrhythmia - PAC's    Depression with anxiety    GERD (gastroesophageal reflux disease)    Erosive esophagitis    Essential hypertension    Family history of early CAD    Mild intermittent asthma without complication    Failed back syndrome of lumbar spine    Chronic bilateral low back pain with left-sided sciatica    Spinal stenosis of lumbar region with neurogenic claudication    Obstruction to urinary outflow    Personal history of nicotine dependence     Obesity (BMI 30-39. 9) Past Medical History:   Diagnosis Date    Chronic back pain     DDD (degenerative disc disease)     Depression 7/17/2017    Depression with anxiety     DJD (degenerative joint disease)     knees, ankles, spine    GERD (gastroesophageal reflux disease)     GERD with stricture 3/18/2015    Hyperlipidemia, mixed     Internal derangement of shoulder 5/21/2013    Left nephrolithiasis 5/5/2017    Left shoulder strain 5/21/2013    Obesity (BMI 30-39.9) 6/27/2019    Radiculopathy     cervical, and lumbar     Past Surgical History:   Procedure Laterality Date    BACK SURGERY      L4-L5, L5-S1    CARPAL TUNNEL RELEASE Left 06/24/14    Dr. Mary Wiley Left 6/24/14    ulnar nerve relocation, Dr. George Martinez.  EMG Left 4/3/14    Upper Dr. Katelyn Rivas ARTHROSCOPY Left 2/1/13    Dr George Martinez, torn meniscus    LUMBAR FUSION  07/17/2017    L4-L5-S1 with rods, screws, and cages. Dr Russel Montes De Oca, [facility] NECK SURGERY      4-5 fusion    NERVE SURGERY Left 11/24/15    Dr Marlene Patton, left ulnar nerve transposition, additional correction.  SHOULDER SURGERY      x2 right shoulder, x2 left    WISDOM TOOTH EXTRACTION      x4     Family History   Problem Relation Age of Onset    High Blood Pressure Mother     Thyroid Disease Mother     Heart Disease Father         MI X4, since age 48.  Thyroid Disease Father     Bipolar Disorder Brother     Arthritis Brother      Social History     Tobacco Use    Smoking status: Current Every Day Smoker     Packs/day: 0.50     Years: 22.00     Pack years: 11.00     Types: Cigarettes    Smokeless tobacco: Current User     Types: Chew    Tobacco comment: trying to, is down to the 1/2 pack daily from 1. to start the Chantix soon, note also trying vaping.     Substance Use Topics    Alcohol use: No     Comment: rare, holiday only    Drug use: No       Current Outpatient Medications:     fluticasone (FLONASE) 50 MCG/ACT aspirin 81 MG EC tablet, Take 81 mg by mouth daily. , Disp: , Rfl:     Subjective:     Review of Systems   Constitutional: Negative for appetite change, diaphoresis, fever and unexpected weight change. HENT: Positive for ear pain and hearing loss. Negative for ear discharge, rhinorrhea and sore throat. Eyes: Negative for pain. Respiratory: Negative for cough and shortness of breath. Cardiovascular: Negative for chest pain and leg swelling. Gastrointestinal: Negative for blood in stool, diarrhea and vomiting. Musculoskeletal: Negative for gait problem. Skin: Negative for pallor. Neurological: Positive for headaches. Negative for seizures. Psychiatric/Behavioral: Negative for dysphoric mood and suicidal ideas. Objective:     BP (!) 131/93   Pulse 96   Resp 16   Ht 6' 0.01\" (1.829 m)   Wt 244 lb 3.2 oz (110.8 kg)   SpO2 98%   BMI 33.11 kg/m²     Physical Exam   Constitutional: He appears well-developed. HENT:   Right Ear: External ear normal.   Left Ear: External ear normal.   Mouth/Throat: No oropharyngeal exudate. Left TM quite erythematous, no perforation  Right TM normal   Eyes: Conjunctivae and EOM are normal.   Neck: Normal range of motion. Neck supple. Cardiovascular: Normal heart sounds. No murmur heard. Pulmonary/Chest: Effort normal and breath sounds normal. No respiratory distress. He has no wheezes. Lymphadenopathy:     He has no cervical adenopathy. Neurological: He is alert. Skin: He is not diaphoretic. Psychiatric: He has a normal mood and affect. His behavior is normal. Judgment and thought content normal.       Assessment & Plan:      1. Personal history of nicotine dependence   Had a min 3 min discussion regarding smoking cessation options, side effects, and behavioral counseling. Reviewed chantix, buproprion, nicotine replacement. Behavioral modifications - don't smoke in the house or in the car. Stay active.  Find support in family members and friends. He wishes to continue on Zyban  Follow up to review smoking cessation.  - OK TOBACCO USE CESSATION INTERMEDIATE 3-10 MINUTES    2. Obesity (BMI 30-39. 9)  Had a detailed discussion about weight loss, portion control, calorie counting via myfitnesspal, taking stairs, parking far from destination, increasing intensity of physical activity. Pt is motivated to lose weight. 3. Left otitis media, unspecified otitis media type  We will treat with amoxicillin, continue Flonase and sinus rinse    4. Elevated BP without diagnosis of hypertension  Blood pressures very well controlled at the last visits but elevated today, follow-up in 1 month for BP recheck    Call or return to clinic prn if these symptoms worsen or fail to improve as anticipated. I have reviewed the instructions with the patient, answering all questions to their satisfaction.     Electronically signed by Maurice Birmingham MD on 6/27/2019 at 12:37 PM

## 2019-07-08 ENCOUNTER — OFFICE VISIT (OUTPATIENT)
Dept: FAMILY MEDICINE CLINIC | Age: 39
End: 2019-07-08
Payer: MEDICARE

## 2019-07-08 VITALS
BODY MASS INDEX: 32.82 KG/M2 | HEIGHT: 73 IN | OXYGEN SATURATION: 98 % | DIASTOLIC BLOOD PRESSURE: 86 MMHG | WEIGHT: 247.6 LBS | HEART RATE: 78 BPM | SYSTOLIC BLOOD PRESSURE: 128 MMHG | RESPIRATION RATE: 16 BRPM

## 2019-07-08 DIAGNOSIS — H65.92 LEFT OTITIS MEDIA WITH EFFUSION: Primary | ICD-10-CM

## 2019-07-08 DIAGNOSIS — M54.9 UPPER BACK PAIN: ICD-10-CM

## 2019-07-08 DIAGNOSIS — M43.22 CERVICAL VERTEBRAL FUSION: ICD-10-CM

## 2019-07-08 DIAGNOSIS — M54.2 NECK PAIN: ICD-10-CM

## 2019-07-08 PROCEDURE — 99213 OFFICE O/P EST LOW 20 MIN: CPT | Performed by: FAMILY MEDICINE

## 2019-07-08 RX ORDER — AMOXICILLIN AND CLAVULANATE POTASSIUM 562.5; 437.5; 62.5 MG/1; MG/1; MG/1
1 TABLET, FILM COATED, EXTENDED RELEASE ORAL 2 TIMES DAILY
Qty: 20 TABLET | Refills: 0 | Status: SHIPPED | OUTPATIENT
Start: 2019-07-08 | End: 2019-07-18

## 2019-07-08 ASSESSMENT — ENCOUNTER SYMPTOMS
BACK PAIN: 1
SHORTNESS OF BREATH: 0
EYE PAIN: 0

## 2019-07-17 DIAGNOSIS — B00.9 HERPES SIMPLEX: ICD-10-CM

## 2019-07-17 RX ORDER — VALACYCLOVIR HYDROCHLORIDE 1 G/1
TABLET, FILM COATED ORAL
Qty: 12 TABLET | Refills: 3 | Status: SHIPPED | OUTPATIENT
Start: 2019-07-17

## 2019-07-29 ENCOUNTER — HOSPITAL ENCOUNTER (OUTPATIENT)
Dept: GENERAL RADIOLOGY | Age: 39
Discharge: HOME | End: 2019-07-31
Payer: MEDICARE

## 2019-07-29 ENCOUNTER — HOSPITAL ENCOUNTER (OUTPATIENT)
Age: 39
Discharge: HOME | End: 2019-07-31
Payer: MEDICARE

## 2019-07-29 DIAGNOSIS — M43.22 CERVICAL VERTEBRAL FUSION: ICD-10-CM

## 2019-07-29 DIAGNOSIS — M54.2 NECK PAIN: ICD-10-CM

## 2019-07-29 DIAGNOSIS — M54.9 UPPER BACK PAIN: ICD-10-CM

## 2019-07-29 PROCEDURE — 72070 X-RAY EXAM THORAC SPINE 2VWS: CPT

## 2019-07-29 PROCEDURE — 72040 X-RAY EXAM NECK SPINE 2-3 VW: CPT

## 2019-09-12 ENCOUNTER — HOSPITAL ENCOUNTER (OUTPATIENT)
Age: 39
Discharge: HOME | End: 2019-09-14
Payer: MEDICARE

## 2019-09-12 ENCOUNTER — HOSPITAL ENCOUNTER (OUTPATIENT)
Dept: GENERAL RADIOLOGY | Age: 39
Discharge: HOME | End: 2019-09-14
Payer: MEDICARE

## 2019-09-12 DIAGNOSIS — N20.0 CALCULUS OF KIDNEY: ICD-10-CM

## 2019-09-12 PROCEDURE — 74018 RADEX ABDOMEN 1 VIEW: CPT

## 2019-11-05 ENCOUNTER — NURSE ONLY (OUTPATIENT)
Dept: FAMILY MEDICINE CLINIC | Age: 39
End: 2019-11-05
Payer: MEDICARE

## 2019-11-05 DIAGNOSIS — Z23 NEED FOR INFLUENZA VACCINATION: Primary | ICD-10-CM

## 2019-11-05 PROCEDURE — G0008 ADMIN INFLUENZA VIRUS VAC: HCPCS | Performed by: FAMILY MEDICINE

## 2019-11-05 PROCEDURE — 90686 IIV4 VACC NO PRSV 0.5 ML IM: CPT | Performed by: FAMILY MEDICINE

## 2020-02-10 RX ORDER — GABAPENTIN 300 MG/1
CAPSULE ORAL
Qty: 180 CAPSULE | Refills: 1 | Status: SHIPPED | OUTPATIENT
Start: 2020-02-10 | End: 2020-02-14 | Stop reason: SDUPTHER

## 2020-02-10 RX ORDER — ROSUVASTATIN CALCIUM 10 MG/1
TABLET, COATED ORAL
Qty: 90 TABLET | Refills: 1 | Status: SHIPPED | OUTPATIENT
Start: 2020-02-10 | End: 2020-07-02 | Stop reason: SDUPTHER

## 2020-02-10 RX ORDER — OMEPRAZOLE 20 MG/1
CAPSULE, DELAYED RELEASE ORAL
Qty: 90 CAPSULE | Refills: 1 | Status: SHIPPED | OUTPATIENT
Start: 2020-02-10 | End: 2020-07-02 | Stop reason: SDUPTHER

## 2020-02-10 RX ORDER — CELECOXIB 200 MG/1
CAPSULE ORAL
Qty: 90 CAPSULE | Refills: 1 | Status: SHIPPED | OUTPATIENT
Start: 2020-02-10 | End: 2020-07-02 | Stop reason: SDUPTHER

## 2020-02-10 RX ORDER — ESCITALOPRAM OXALATE 10 MG/1
TABLET ORAL
Qty: 90 TABLET | Refills: 1 | Status: SHIPPED | OUTPATIENT
Start: 2020-02-10 | End: 2020-07-02 | Stop reason: SDUPTHER

## 2020-02-10 RX ORDER — METOPROLOL SUCCINATE 50 MG/1
TABLET, EXTENDED RELEASE ORAL
Qty: 90 TABLET | Refills: 1 | Status: SHIPPED | OUTPATIENT
Start: 2020-02-10 | End: 2020-07-02 | Stop reason: ALTCHOICE

## 2020-02-14 ENCOUNTER — OFFICE VISIT (OUTPATIENT)
Dept: FAMILY MEDICINE CLINIC | Age: 40
End: 2020-02-14
Payer: MEDICARE

## 2020-02-14 VITALS
SYSTOLIC BLOOD PRESSURE: 129 MMHG | HEART RATE: 77 BPM | WEIGHT: 260.4 LBS | DIASTOLIC BLOOD PRESSURE: 89 MMHG | TEMPERATURE: 97.2 F | BODY MASS INDEX: 34.36 KG/M2 | OXYGEN SATURATION: 97 %

## 2020-02-14 PROCEDURE — 99406 BEHAV CHNG SMOKING 3-10 MIN: CPT | Performed by: FAMILY MEDICINE

## 2020-02-14 PROCEDURE — G0402 INITIAL PREVENTIVE EXAM: HCPCS | Performed by: FAMILY MEDICINE

## 2020-02-14 RX ORDER — ASCORBIC ACID 500 MG
1000 TABLET ORAL DAILY
COMMUNITY
End: 2020-12-28

## 2020-02-14 RX ORDER — GABAPENTIN 300 MG/1
CAPSULE ORAL
Qty: 270 CAPSULE | Refills: 1 | Status: SHIPPED | OUTPATIENT
Start: 2020-02-14 | End: 2020-07-02 | Stop reason: SDUPTHER

## 2020-02-14 ASSESSMENT — ENCOUNTER SYMPTOMS
BLOOD IN STOOL: 0
BACK PAIN: 1
EYE PAIN: 0
SHORTNESS OF BREATH: 0

## 2020-02-14 NOTE — PROGRESS NOTES
MD Lalit Butler[address]  [place] [address]  [address]  Dept: [phone]    Fátima Jiang is a 36 y.o. male who presents today for hismedical conditions/complaints as noted below.   Fátima Jiang is here today c/o Annual Exam and Medication Check (gabapentin)       HPI:     HPI    Here today for wellness visit  Due for blood work  Seen today with daughter    Chronic neck and upper back pain, on gabapentin - would like to go up on his dose as he feels like the dose is waning in the afternoon, had cervical and lumbar fusion  Smoking, was taking Zyban but this was leading to sweats so he weaned himself off of this about a month ago, smoking now down to few cigarettes a week, he is not interested in smoking cessation aids  For the last couple of months he has been having blistering of the skin which then comes off with touch and leaves behind a scab, no obvious triggers, mainly affects his upper and lower extremities, no fevers, applies Polysporin to the scabs, no active lesions right now, he mainly just has the scabs, no hives, no issues with throat or lip swelling or dizziness or vomiting or other signs of anaphylaxis  Daughter diagnosed with Biju-Danlos syndrome with rheumatologist, he has an appointment upcoming with the same rheumatologist to have the testing done for himself, he declined order for echo today until he has the official diagnosis  Weight up 13 pounds since his last visit, he has not been monitoring his diet, eating more rice/pasta and potatoes and not much vegetables, a week ago he started to get his diet back on track  Wt Readings from Last 3 Encounters:   02/14/20 260 lb 6.4 oz (118.1 kg)   07/08/19 247 lb 9.6 oz (112.3 kg)   06/27/19 244 lb 3.2 oz (110.8 kg)     Patient Active Problem List   Diagnosis    Herpes simplex    Migraine    Hyperlipidemia    Perennial allergic rhinitis with seasonal variation Content: Thought content normal.         Judgment: Judgment normal.         Assessment & Plan:      1. Routine general medical examination at a health care facility  Recommended healthy diet / daily exercise, avoidance of alcohol / smoking / recreational drugs  Recommended bi-annual dental exam / yearly vision exam   Colonoscopy after age 48  - Basic Metabolic Panel; Future  - CBC Auto Differential; Future  - Lipid Panel; Future    2. Personal history of nicotine dependence   Had a min 3 min discussion regarding smoking cessation options, side effects, and behavioral counseling. Reviewed chantix, nicotine replacement. Behavioral modifications - don't smoke in the house or in the car. Stay active. Find support in family members and friends. He feels ready to quit on his own  Follow up to review smoking cessation.  - HI TOBACCO USE CESSATION INTERMEDIATE 3-10 MINUTES    3. Obesity (BMI 30-39. 9)  The patient is asked to make an attempt to improve diet and exercise patterns to aid in medical management of this problem. - Lipid Panel; Future    4. Essential hypertension  Blood pressure at target  - Basic Metabolic Panel; Future  - CBC Auto Differential; Future    5. Bullous pemphigoid  I am wondering about a diagnosis of bullous pemphigoid, Rx given for steroid cream to apply to the lesions, would benefit from seeing dermatology  - Pritesh Berkowitz MD, Dermatology, [facility]  - betamethasone valerate (VALISONE) 0.1 % cream; Apply topically 2 times daily. Do not use for longer than 2 weeks. Dispense: 1 Tube; Refill: 0    6. Spinal stenosis of lumbar region with neurogenic claudication  Gabapentin dose increased as per his request, advised on risk of sedation  - gabapentin (NEURONTIN) 300 MG capsule; TAKE 1 CAPSULE THREE TIMES A DAY (SEDATION WARNING)  Dispense: 270 capsule; Refill: 1    Call or return to clinic prn if these symptoms worsen or fail to improve as anticipated.   I have reviewed the instructions with the patient, answering all questions to their satisfaction.     Electronically signed by Clay Mcdowell MD on 2/14/2020 at 12:55 PM

## 2020-02-25 ENCOUNTER — HOSPITAL ENCOUNTER (OUTPATIENT)
Age: 40
Setting detail: SPECIMEN
Discharge: HOME | End: 2020-02-25
Payer: MEDICARE

## 2020-02-25 ENCOUNTER — OFFICE VISIT (OUTPATIENT)
Dept: DERMATOLOGY | Age: 40
End: 2020-02-25
Payer: MEDICARE

## 2020-02-25 VITALS
OXYGEN SATURATION: 98 % | WEIGHT: 255.4 LBS | HEIGHT: 73 IN | SYSTOLIC BLOOD PRESSURE: 132 MMHG | BODY MASS INDEX: 33.85 KG/M2 | DIASTOLIC BLOOD PRESSURE: 81 MMHG | HEART RATE: 73 BPM

## 2020-02-25 LAB
ABSOLUTE EOS #: 0.18 K/UL (ref 0–0.44)
ABSOLUTE IMMATURE GRANULOCYTE: 0.05 K/UL (ref 0–0.3)
ABSOLUTE LYMPH #: 2.06 K/UL (ref 1.1–3.7)
ABSOLUTE MONO #: 0.47 K/UL (ref 0.1–1.2)
ANION GAP SERPL CALCULATED.3IONS-SCNC: 16 MMOL/L (ref 9–17)
BASOPHILS # BLD: 1 % (ref 0–2)
BASOPHILS ABSOLUTE: 0.08 K/UL (ref 0–0.2)
BUN BLDV-MCNC: 17 MG/DL (ref 6–20)
BUN/CREAT BLD: ABNORMAL (ref 9–20)
CALCIUM SERPL-MCNC: 9.5 MG/DL (ref 8.6–10.4)
CHLORIDE BLD-SCNC: 104 MMOL/L (ref 98–107)
CHOLESTEROL/HDL RATIO: 4.5
CHOLESTEROL: 153 MG/DL
CO2: 22 MMOL/L (ref 20–31)
CREAT SERPL-MCNC: 0.73 MG/DL (ref 0.7–1.2)
DIFFERENTIAL TYPE: ABNORMAL
EOSINOPHILS RELATIVE PERCENT: 3 % (ref 1–4)
GFR AFRICAN AMERICAN: >60 ML/MIN
GFR NON-AFRICAN AMERICAN: >60 ML/MIN
GFR SERPL CREATININE-BSD FRML MDRD: ABNORMAL ML/MIN/{1.73_M2}
GFR SERPL CREATININE-BSD FRML MDRD: ABNORMAL ML/MIN/{1.73_M2}
GLUCOSE BLD-MCNC: 104 MG/DL (ref 70–99)
HCT VFR BLD CALC: 44.1 % (ref 40.7–50.3)
HDLC SERPL-MCNC: 34 MG/DL
HEMOGLOBIN: 15.2 G/DL (ref 13–17)
IMMATURE GRANULOCYTES: 1 %
LDL CHOLESTEROL DIRECT: 61 MG/DL
LDL CHOLESTEROL: ABNORMAL MG/DL (ref 0–130)
LYMPHOCYTES # BLD: 30 % (ref 24–43)
MCH RBC QN AUTO: 30.2 PG (ref 25.2–33.5)
MCHC RBC AUTO-ENTMCNC: 34.5 G/DL (ref 28.4–34.8)
MCV RBC AUTO: 87.7 FL (ref 82.6–102.9)
MONOCYTES # BLD: 7 % (ref 3–12)
NRBC AUTOMATED: 0 PER 100 WBC
PDW BLD-RTO: 12.3 % (ref 11.8–14.4)
PLATELET # BLD: 215 K/UL (ref 138–453)
PLATELET ESTIMATE: ABNORMAL
PMV BLD AUTO: 10.5 FL (ref 8.1–13.5)
POTASSIUM SERPL-SCNC: 3.9 MMOL/L (ref 3.7–5.3)
RBC # BLD: 5.03 M/UL (ref 4.21–5.77)
RBC # BLD: ABNORMAL 10*6/UL
SEG NEUTROPHILS: 58 % (ref 36–65)
SEGMENTED NEUTROPHILS ABSOLUTE COUNT: 4.12 K/UL (ref 1.5–8.1)
SODIUM BLD-SCNC: 142 MMOL/L (ref 135–144)
TRIGL SERPL-MCNC: 449 MG/DL
VLDLC SERPL CALC-MCNC: ABNORMAL MG/DL (ref 1–30)
WBC # BLD: 7 K/UL (ref 3.5–11.3)
WBC # BLD: ABNORMAL 10*3/UL

## 2020-02-25 PROCEDURE — 83721 ASSAY OF BLOOD LIPOPROTEIN: CPT

## 2020-02-25 PROCEDURE — 80048 BASIC METABOLIC PNL TOTAL CA: CPT

## 2020-02-25 PROCEDURE — 11104 PUNCH BX SKIN SINGLE LESION: CPT | Performed by: DERMATOLOGY

## 2020-02-25 PROCEDURE — 80061 LIPID PANEL: CPT

## 2020-02-25 PROCEDURE — 36415 COLL VENOUS BLD VENIPUNCTURE: CPT

## 2020-02-25 PROCEDURE — 11105 PUNCH BX SKIN EA SEP/ADDL: CPT | Performed by: DERMATOLOGY

## 2020-02-25 PROCEDURE — 85025 COMPLETE CBC W/AUTO DIFF WBC: CPT

## 2020-02-25 RX ORDER — LIDOCAINE HYDROCHLORIDE AND EPINEPHRINE 10; 10 MG/ML; UG/ML
0.5 INJECTION, SOLUTION INFILTRATION; PERINEURAL ONCE
Status: COMPLETED | OUTPATIENT
Start: 2020-02-25 | End: 2020-02-25

## 2020-02-25 RX ORDER — ICOSAPENT ETHYL 1000 MG/1
2 CAPSULE ORAL 2 TIMES DAILY WITH MEALS
Qty: 120 CAPSULE | Refills: 11 | Status: SHIPPED | OUTPATIENT
Start: 2020-02-25 | End: 2020-06-10

## 2020-02-25 RX ADMIN — LIDOCAINE HYDROCHLORIDE AND EPINEPHRINE 0.5 ML: 10; 10 INJECTION, SOLUTION INFILTRATION; PERINEURAL at 16:29

## 2020-02-25 NOTE — PROGRESS NOTES
Dermatology Patient Note  [facility] DERMATOLOGY  [address] [address]  Dept: [phone]  Dept Fax: [phone]      VISITDATE: 2/25/2020   REFERRING PROVIDER: Jose Middleton MD      Donald Fabian is a 36 y.o. male  who presents today in the office for:    New Patient (Spot on hands, feet and knees itch, using Betamethasone Bettina. topical TID X 2weeks)      HISTORY OF PRESENT ILLNESS:  36 y.o. male presenting for rash spots  Location: hands, legs, arms, feet  Duration: months  Symptoms: itching, scarring  Course: starts with small bump that if he rubs it the skin comes off and then it's very itchy and he scratches it and it widens. Slow to heal and leaves behind scar  Exacerbating factors: unsure  Prior treatments: valisone helps  Also has history of cold sores      CURRENT MEDICATIONS:   Current Outpatient Medications   Medication Sig Dispense Refill    Icosapent Ethyl (VASCEPA) 1 g CAPS capsule Take 2 capsules by mouth 2 times daily (with meals) 120 capsule 11    vitamin C (ASCORBIC ACID) 500 MG tablet Take 1,000 mg by mouth daily      betamethasone valerate (VALISONE) 0.1 % cream Apply topically 2 times daily. Do not use for longer than 2 weeks.  1 Tube 0    gabapentin (NEURONTIN) 300 MG capsule TAKE 1 CAPSULE THREE TIMES A DAY (SEDATION WARNING) 270 capsule 1    escitalopram (LEXAPRO) 10 MG tablet TAKE 1 TABLET DAILY 90 tablet 1    celecoxib (CELEBREX) 200 MG capsule TAKE 1 CAPSULE DAILY 90 capsule 1    rosuvastatin (CRESTOR) 10 MG tablet TAKE 1 TABLET DAILY 90 tablet 1    omeprazole (PRILOSEC) 20 MG delayed release capsule TAKE 1 CAPSULE DAILY 90 capsule 1    metoprolol succinate (TOPROL XL) 50 MG extended release tablet TAKE 1 TABLET DAILY 90 tablet 1    valACYclovir (VALTREX) 1 g tablet take 2 tablets by mouth AT FIRST SIGN OF COLD SORE AND REPEAT IN 12 HOURS 12 tablet 3    fluticasone (FLONASE) 50 MCG/ACT nasal spray 1 spray by Nasal route daily 2 sprays each nostril daily 3 Bottle 3    loratadine (CLARITIN) 10 MG tablet Take 10 mg by mouth daily as needed (allergies)      cyclobenzaprine (FLEXERIL) 10 MG tablet Take 1 tablet by mouth 3 times daily as needed for Muscle spasms (sedation warning.) 60 tablet 1    albuterol (PROAIR HFA) 108 (90 BASE) MCG/ACT inhaler Inhale 2 puffs into the lungs every 4 hours as needed for Wheezing or Shortness of Breath 1 Inhaler 3    aspirin-acetaminophen-caffeine (EXCEDRIN MIGRAINE) 250-250-65 MG per tablet Take 2 tablets by mouth daily as needed.  aspirin 81 MG EC tablet Take 81 mg by mouth daily. Current Facility-Administered Medications   Medication Dose Route Frequency Provider Last Rate Last Dose    lidocaine-EPINEPHrine 1 percent-[id number] injection 0.5 mL  0.5 mL Intradermal Once Sovald Silva Rojas MD           ALLERGIES:   Allergies   Allergen Reactions    Shrimp Flavor Swelling     Hands swell up,  Positive allergy testing.  Mobic [Meloxicam] Diarrhea    Zocor [Simvastatin] Other (See Comments)     Muscle pain / spasm       SOCIAL HISTORY:  Social History     Tobacco Use    Smoking status: Never Smoker    Smokeless tobacco: Never Used    Tobacco comment: trying to, is down to the 1/2 pack daily from 1. to start the Chantix soon, note also trying vaping. Substance Use Topics    Alcohol use: No     Comment: rare, holiday only       REVIEW OF SYSTEMS:  Review of Systems  Skin: Denies any new changing, growing orbleeding lesions or rashes except as described in the HPI   Constitutional: Denies fevers, chills, and malaise.     PHYSICAL EXAM:   /81   Pulse 73   Ht 6' 1\" (1.854 m)   Wt 255 lb 6.4 oz (115.8 kg)   SpO2 98%   BMI 33.70 kg/m²     General Exam:  General Appearance: No acute distress, Well nourished     Neuro: Alert and oriented to person, place and time  Psych: Normal affect   Lymph Node: Not performed    Cutaneous Exam: Performed as documented in clinic note

## 2020-02-28 LAB — DERMATOLOGY PATHOLOGY REPORT: NORMAL

## 2020-03-06 ENCOUNTER — OFFICE VISIT (OUTPATIENT)
Dept: DERMATOLOGY | Age: 40
End: 2020-03-06
Payer: MEDICARE

## 2020-03-06 VITALS
HEIGHT: 73 IN | BODY MASS INDEX: 34.3 KG/M2 | DIASTOLIC BLOOD PRESSURE: 88 MMHG | OXYGEN SATURATION: 96 % | SYSTOLIC BLOOD PRESSURE: 127 MMHG | HEART RATE: 74 BPM | WEIGHT: 258.8 LBS

## 2020-03-06 PROCEDURE — G8482 FLU IMMUNIZE ORDER/ADMIN: HCPCS | Performed by: DERMATOLOGY

## 2020-03-06 PROCEDURE — G8427 DOCREV CUR MEDS BY ELIG CLIN: HCPCS | Performed by: DERMATOLOGY

## 2020-03-06 PROCEDURE — 1036F TOBACCO NON-USER: CPT | Performed by: DERMATOLOGY

## 2020-03-06 PROCEDURE — G8417 CALC BMI ABV UP PARAM F/U: HCPCS | Performed by: DERMATOLOGY

## 2020-03-06 PROCEDURE — 99213 OFFICE O/P EST LOW 20 MIN: CPT | Performed by: DERMATOLOGY

## 2020-03-06 RX ORDER — CLOBETASOL PROPIONATE 0.5 MG/G
OINTMENT TOPICAL
Qty: 30 G | Refills: 2 | Status: SHIPPED | OUTPATIENT
Start: 2020-03-06

## 2020-03-06 NOTE — PROGRESS NOTES
Dermatology Patient Note  [facility] PHYSICIANS  [facility] DERMATOLOGY  [address] [address] [facility] [address]  Dept: [phone]  Dept Fax: [phone]      VISITDATE: 3/6/2020   REFERRING PROVIDER: No ref. provider found      Hina Encarnacion is a 36 y.o. male  who presents today in the office for:    Other (suture removal and bx results )      HISTORY OF PRESENT ILLNESS:  Patient presents for f/u rash, SR and biopsy results  Interval history: improved since biopsy, not getting anymore spots  Current treatment and adherence: valisone only  Prior treatments tried: see initial HPI    SKIN, LEFT LOWER LEG, PUNCH BIOPSIES FOR DIRECT IMMUNOFLUORESCENCE AND   ROUTINE HISTOLOGY:        -  ROUTINE HISTOLOGY:                  -  CHRONIC SPONGIOTIC DERMATITIS, SUGGESTIVE OF LICHEN   SIMPLEX CHRONICUS, CHRONIC NUMMULAR DERMATITIS OR CHRONIC IRRITANT   DERMATITIS.      -  DIRECT IMMUNOFLUORESCENCE:  NEGATIVE FOR IMMUNE DEPOSITS. CURRENT MEDICATIONS:   Current Outpatient Medications   Medication Sig Dispense Refill    clobetasol (TEMOVATE) 0.05 % ointment Apply to rash twice daily (not face, armpit or groin) 30 g 2    Icosapent Ethyl (VASCEPA) 1 g CAPS capsule Take 2 capsules by mouth 2 times daily (with meals) 120 capsule 11    vitamin C (ASCORBIC ACID) 500 MG tablet Take 1,000 mg by mouth daily      betamethasone valerate (VALISONE) 0.1 % cream Apply topically 2 times daily. Do not use for longer than 2 weeks.  1 Tube 0    gabapentin (NEURONTIN) 300 MG capsule TAKE 1 CAPSULE THREE TIMES A DAY (SEDATION WARNING) 270 capsule 1    escitalopram (LEXAPRO) 10 MG tablet TAKE 1 TABLET DAILY 90 tablet 1    celecoxib (CELEBREX) 200 MG capsule TAKE 1 CAPSULE DAILY 90 capsule 1    rosuvastatin (CRESTOR) 10 MG tablet TAKE 1 TABLET DAILY 90 tablet 1    omeprazole (PRILOSEC) 20 MG delayed release capsule TAKE 1 CAPSULE DAILY 90 capsule 1    metoprolol succinate (TOPROL XL) 50 MG extended release tablet TAKE 1 TABLET DAILY 90 tablet 1    valACYclovir (VALTREX) 1 g tablet take 2 tablets by mouth AT FIRST SIGN OF COLD SORE AND REPEAT IN 12 HOURS 12 tablet 3    fluticasone (FLONASE) 50 MCG/ACT nasal spray 1 spray by Nasal route daily 2 sprays each nostril daily 3 Bottle 3    loratadine (CLARITIN) 10 MG tablet Take 10 mg by mouth daily as needed (allergies)      cyclobenzaprine (FLEXERIL) 10 MG tablet Take 1 tablet by mouth 3 times daily as needed for Muscle spasms (sedation warning.) 60 tablet 1    albuterol (PROAIR HFA) 108 (90 BASE) MCG/ACT inhaler Inhale 2 puffs into the lungs every 4 hours as needed for Wheezing or Shortness of Breath 1 Inhaler 3    aspirin-acetaminophen-caffeine (EXCEDRIN MIGRAINE) 250-250-65 MG per tablet Take 2 tablets by mouth daily as needed.  aspirin 81 MG EC tablet Take 81 mg by mouth daily. No current facility-administered medications for this visit. ALLERGIES:   Allergies   Allergen Reactions    Shrimp Flavor Swelling     Hands swell up,  Positive allergy testing.  Mobic [Meloxicam] Diarrhea    Zocor [Simvastatin] Other (See Comments)     Muscle pain / spasm       SOCIAL HISTORY:  Social History     Tobacco Use    Smoking status: Never Smoker    Smokeless tobacco: Never Used    Tobacco comment: trying to, is down to the 1/2 pack daily from 1. to start the Chantix soon, note also trying vaping. Substance Use Topics    Alcohol use: No     Comment: rare, holiday only       REVIEW OF SYSTEMS:  Review of Systems  Skin: Denies any new changing, growing orbleeding lesions or rashes except as described in the HPI   Constitutional: Denies fevers, chills, and malaise.     PHYSICAL EXAM:   /88 (Site: Left Upper Arm, Position: Sitting, Cuff Size: Large Adult)   Pulse 74   Ht 6' 1\" (1.854 m)   Wt 258 lb 12.8 oz (117.4 kg)   SpO2 96%   BMI 34.14 kg/m²     General Exam:  General Appearance: No acute distress, Well nourished     Neuro: Alert and oriented to

## 2020-03-11 ENCOUNTER — HOSPITAL ENCOUNTER (OUTPATIENT)
Age: 40
Discharge: HOME | End: 2020-03-13
Payer: MEDICARE

## 2020-03-11 ENCOUNTER — HOSPITAL ENCOUNTER (OUTPATIENT)
Dept: GENERAL RADIOLOGY | Age: 40
Discharge: HOME | End: 2020-03-13
Payer: MEDICARE

## 2020-03-11 PROCEDURE — 74018 RADEX ABDOMEN 1 VIEW: CPT

## 2020-04-20 ENCOUNTER — TELEMEDICINE (OUTPATIENT)
Dept: FAMILY MEDICINE CLINIC | Age: 40
End: 2020-04-20
Payer: MEDICARE

## 2020-04-20 PROCEDURE — 99213 OFFICE O/P EST LOW 20 MIN: CPT | Performed by: FAMILY MEDICINE

## 2020-04-20 PROCEDURE — G8428 CUR MEDS NOT DOCUMENT: HCPCS | Performed by: FAMILY MEDICINE

## 2020-04-20 RX ORDER — MONTELUKAST SODIUM 10 MG/1
10 TABLET ORAL DAILY
Qty: 30 TABLET | Refills: 5 | Status: SHIPPED | OUTPATIENT
Start: 2020-04-20 | End: 2020-07-02 | Stop reason: SDUPTHER

## 2020-04-20 RX ORDER — AMOXICILLIN AND CLAVULANATE POTASSIUM 875; 125 MG/1; MG/1
1 TABLET, FILM COATED ORAL 2 TIMES DAILY
Qty: 20 TABLET | Refills: 0 | Status: SHIPPED | OUTPATIENT
Start: 2020-04-20 | End: 2020-04-30

## 2020-04-20 RX ORDER — FLUTICASONE PROPIONATE 50 MCG
1 SPRAY, SUSPENSION (ML) NASAL DAILY
Qty: 3 BOTTLE | Refills: 3 | Status: SHIPPED | OUTPATIENT
Start: 2020-04-20

## 2020-04-20 ASSESSMENT — ENCOUNTER SYMPTOMS
FACIAL SWELLING: 0
WHEEZING: 0
SINUS PRESSURE: 1
SHORTNESS OF BREATH: 0
COUGH: 0

## 2020-04-20 NOTE — PROGRESS NOTES
fluticasone (FLONASE) 50 MCG/ACT nasal spray; 1 spray by Nasal route daily 2 sprays each nostril daily  Dispense: 3 Bottle; Refill: 3  - montelukast (SINGULAIR) 10 MG tablet; Take 1 tablet by mouth daily  Dispense: 30 tablet; Refill: 5  - amoxicillin-clavulanate (AUGMENTIN) 875-125 MG per tablet; Take 1 tablet by mouth 2 times daily for 10 days  Dispense: 20 tablet; Refill: 0    Call or return to clinic prn if these symptoms worsen or fail to improve as anticipated. I have reviewed the instructions with the patient, answering all questions to their satisfaction. Electronically signed by Warden Danielle MD on 4/20/2020 at 2:17 PM     Mirian Olivera is a 36 y.o. male being evaluated by a Virtual Visit (video visit) encounter to address concerns as mentioned above. A caregiver was present when appropriate. Due to this being a TeleHealth encounter (During [facility]-23 public health emergency), evaluation of the following organ systems was limited: Vitals/Constitutional/EENT/Resp/CV/GI//MS/Neuro/Skin/Heme-Lymph-Imm. Pursuant to the emergency declaration under the [address] [place] [address] and the [employer] and [employer] Act, this Virtual Visit was conducted with patient's (and/or legal guardian's) consent, to reduce the patient's risk of exposure to COVID-19 and provide necessary medical care. The patient (and/or legal guardian) has also been advised to contact this office for worsening conditions or problems, and seek emergency medical treatment and/or call 911 if deemed necessary. Services were provided through a video synchronous discussion virtually to substitute for in-person clinic visit. Patient and provider were located at their individual homes. --Warden Danielle MD on 4/20/2020 at 2:17 PM    An electronic signature was used to authenticate this note.

## 2020-06-10 ENCOUNTER — OFFICE VISIT (OUTPATIENT)
Dept: DERMATOLOGY | Age: 40
End: 2020-06-10
Payer: MEDICARE

## 2020-06-10 ENCOUNTER — HOSPITAL ENCOUNTER (OUTPATIENT)
Age: 40
Setting detail: SPECIMEN
Discharge: HOME | End: 2020-06-10
Payer: MEDICARE

## 2020-06-10 VITALS
HEIGHT: 73 IN | DIASTOLIC BLOOD PRESSURE: 93 MMHG | TEMPERATURE: 98.1 F | BODY MASS INDEX: 34.06 KG/M2 | OXYGEN SATURATION: 96 % | SYSTOLIC BLOOD PRESSURE: 134 MMHG | HEART RATE: 78 BPM | WEIGHT: 257 LBS

## 2020-06-10 PROCEDURE — G8427 DOCREV CUR MEDS BY ELIG CLIN: HCPCS | Performed by: DERMATOLOGY

## 2020-06-10 PROCEDURE — 1036F TOBACCO NON-USER: CPT | Performed by: DERMATOLOGY

## 2020-06-10 PROCEDURE — G8417 CALC BMI ABV UP PARAM F/U: HCPCS | Performed by: DERMATOLOGY

## 2020-06-10 PROCEDURE — 99213 OFFICE O/P EST LOW 20 MIN: CPT | Performed by: DERMATOLOGY

## 2020-06-10 RX ORDER — BUPROPION HYDROCHLORIDE 100 MG/1
100 TABLET, EXTENDED RELEASE ORAL 2 TIMES DAILY
COMMUNITY
End: 2022-09-22

## 2020-06-10 NOTE — PROGRESS NOTES
Dermatology Patient Note  [address]  [address]  [address] [facility] [address]  Dept: [phone]  Dept Fax: [phone]      VISITDATE: 6/10/2020   REFERRING PROVIDER: No ref. provider found      Umu Kim is a 36 y.o. male  who presents today in the office for:    Follow-up (pt stopped taking zyban and noticed skin was clearing up--not having to use the cream; has hardly used the clobetasol--uses a few days here and there and it resolves; started back on zyban and has noticed excessive sweating and wonders if zyban is causing bumps on skin; has lesion on nasal flares from sinus rinse that PCP used to give him valtex for)      HISTORY OF PRESENT ILLNESS:  Patient presents for f/u nummular dermatitis (biopsy supported)  Interval history: clears almost immediately with clobetasol ointment, no rash today. Worsens with sweating (which is worse on wellbutrin, which is takes for smoking cessation). Complains of recurrent sores on nose associated with headaches for which he had to massage his face and manipulate his nose and he believes causes cold sores.  He usually takes valtrex and it helps but it hasn't this time  Current treatment and adherence: as above  Prior treatments tried: see initial HPI      CURRENT MEDICATIONS:   Current Outpatient Medications   Medication Sig Dispense Refill    buPROPion (WELLBUTRIN SR) 100 MG extended release tablet Take 100 mg by mouth 2 times daily      fluticasone (FLONASE) 50 MCG/ACT nasal spray 1 spray by Nasal route daily 2 sprays each nostril daily 3 Bottle 3    montelukast (SINGULAIR) 10 MG tablet Take 1 tablet by mouth daily 30 tablet 5    clobetasol (TEMOVATE) 0.05 % ointment Apply to rash twice daily (not face, armpit or groin) 30 g 2    gabapentin (NEURONTIN) 300 MG capsule TAKE 1 CAPSULE THREE TIMES A DAY (SEDATION WARNING) 270 capsule 1    escitalopram (LEXAPRO) 10 MG tablet TAKE 1 TABLET DAILY 90 tablet 1    Pulse 78   Temp 98.1 °F (36.7 °C)   Ht 6' 1\" (1.854 m)   Wt 257 lb (116.6 kg)   SpO2 96%   BMI 33.91 kg/m²     General Exam:  General Appearance: No acute distress, Well nourished     Neuro: Alert and oriented to person, place and time  Psych: Normal affect   Lymph Node: Not performed    Cutaneous Exam: Performed as documented in clinic note below. Head/face,neck, both arms, digits and/or nails, and limited lower extremities (that which is visible with pants/shorts and shoes/socks on) was examined. Pertinent Physical Exam Findings:  Physical Exam  LEs with hyperpigmented nummular patches, no active rash  Left nare with erythematous erosions    Photo surveillance performed: No    Medical Necessity of Exam Performed:   Distribution of patient concerns    Additional Diagnostic Testing performed during exam: Not performed ,  Not performed    ASSESSMENT:   Diagnosis Orders   1. Skin ulcer, limited to breakdown of skin (HCC)  HSV PCR    Varicella Zoster, Molecular   2. Nummular dermatitis         Plan of Action is as Follows:  Assessment   1. Erosion of left nose - probable HSV doubt shingles  - PCR and if negative, treat with mupirocin for presumed staph  - HSV PCR; Future  - Varicella Zoster, Molecular; Future    2. Nummular dermatitis, quiescent today  - continue clobetasol only as needed    RTC prn            Patient Instructions   -follow up   -continue the clobetasol  -follow up as needed. Follow-up: No follow-ups on file. This note was created with the assistance of a speech-recognition program.  Although the intention is to generate a document that actually reflects the content of the visit, no guarantees can be provided that every mistake has been identified and corrected byediting.     Electronically signed by Houston Yu MD on 6/10/20 at 1:44 PM EDT

## 2020-06-11 ENCOUNTER — TELEPHONE (OUTPATIENT)
Dept: DERMATOLOGY | Age: 40
End: 2020-06-11

## 2020-06-11 LAB
HSV 1, NAAT: NEGATIVE
HSV 2, NAAT: NEGATIVE
SPECIMEN DESCRIPTION: NORMAL
SPECIMEN DESCRIPTION: NORMAL
VARICELLA ZOSTER, NAAT: NEGATIVE
VARICELLA ZOSTER, NAAT: NORMAL

## 2020-06-11 NOTE — TELEPHONE ENCOUNTER
Spoke to patient re: result. No HSV or VZV detected. 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 He has had several neck and spine surgeries. He has seen a neurologist Dr. Iván Souza before. I will place a referral to be seen for his headaches. Counseled him to apply vaseline daily to the erosion on left nose until it heals.

## 2020-06-26 ENCOUNTER — OFFICE VISIT (OUTPATIENT)
Dept: FAMILY MEDICINE CLINIC | Age: 40
End: 2020-06-26
Payer: MEDICARE

## 2020-06-26 VITALS
HEART RATE: 78 BPM | DIASTOLIC BLOOD PRESSURE: 91 MMHG | TEMPERATURE: 98.2 F | OXYGEN SATURATION: 96 % | BODY MASS INDEX: 33.13 KG/M2 | SYSTOLIC BLOOD PRESSURE: 135 MMHG | HEIGHT: 73 IN | WEIGHT: 250 LBS

## 2020-06-26 PROCEDURE — 99213 OFFICE O/P EST LOW 20 MIN: CPT | Performed by: PHYSICIAN ASSISTANT

## 2020-06-26 PROCEDURE — 96372 THER/PROPH/DIAG INJ SC/IM: CPT | Performed by: PHYSICIAN ASSISTANT

## 2020-06-26 PROCEDURE — 1036F TOBACCO NON-USER: CPT | Performed by: PHYSICIAN ASSISTANT

## 2020-06-26 PROCEDURE — G8427 DOCREV CUR MEDS BY ELIG CLIN: HCPCS | Performed by: PHYSICIAN ASSISTANT

## 2020-06-26 PROCEDURE — G8417 CALC BMI ABV UP PARAM F/U: HCPCS | Performed by: PHYSICIAN ASSISTANT

## 2020-06-26 RX ORDER — CYCLOBENZAPRINE HCL 10 MG
10 TABLET ORAL 3 TIMES DAILY PRN
Qty: 30 TABLET | Refills: 0 | Status: CANCELLED | OUTPATIENT
Start: 2020-06-26

## 2020-06-26 RX ORDER — KETOROLAC TROMETHAMINE 30 MG/ML
30 INJECTION, SOLUTION INTRAMUSCULAR; INTRAVENOUS ONCE
Status: COMPLETED | OUTPATIENT
Start: 2020-06-26 | End: 2020-06-26

## 2020-06-26 RX ORDER — IBUPROFEN 800 MG/1
800 TABLET ORAL EVERY 6 HOURS PRN
Qty: 28 TABLET | Refills: 0 | Status: CANCELLED | OUTPATIENT
Start: 2020-06-26 | End: 2020-07-03

## 2020-06-26 RX ORDER — METHYLPREDNISOLONE 4 MG/1
1 TABLET ORAL SEE ADMIN INSTRUCTIONS
COMMUNITY
Start: 2020-06-19 | End: 2020-06-26

## 2020-06-26 RX ORDER — TRIAMCINOLONE ACETONIDE 40 MG/ML
40 INJECTION, SUSPENSION INTRA-ARTICULAR; INTRAMUSCULAR ONCE
Status: COMPLETED | OUTPATIENT
Start: 2020-06-26 | End: 2020-06-26

## 2020-06-26 RX ORDER — PREDNISONE 20 MG/1
20 TABLET ORAL 2 TIMES DAILY
Qty: 10 TABLET | Refills: 0 | Status: SHIPPED | OUTPATIENT
Start: 2020-06-26 | End: 2020-07-01

## 2020-06-26 RX ADMIN — KETOROLAC TROMETHAMINE 30 MG: 30 INJECTION, SOLUTION INTRAMUSCULAR; INTRAVENOUS at 12:55

## 2020-06-26 RX ADMIN — TRIAMCINOLONE ACETONIDE 40 MG: 40 INJECTION, SUSPENSION INTRA-ARTICULAR; INTRAMUSCULAR at 12:56

## 2020-06-26 ASSESSMENT — ENCOUNTER SYMPTOMS
BOWEL INCONTINENCE: 0
BACK PAIN: 1
ABDOMINAL PAIN: 0
RESPIRATORY NEGATIVE: 1

## 2020-06-26 NOTE — PROGRESS NOTES
(SINGULAIR) 10 MG tablet Take 1 tablet by mouth daily 30 tablet 5    clobetasol (TEMOVATE) 0.05 % ointment Apply to rash twice daily (not face, armpit or groin) 30 g 2    vitamin C (ASCORBIC ACID) 500 MG tablet Take 1,000 mg by mouth daily      gabapentin (NEURONTIN) 300 MG capsule TAKE 1 CAPSULE THREE TIMES A DAY (SEDATION WARNING) 270 capsule 1    escitalopram (LEXAPRO) 10 MG tablet TAKE 1 TABLET DAILY 90 tablet 1    rosuvastatin (CRESTOR) 10 MG tablet TAKE 1 TABLET DAILY 90 tablet 1    omeprazole (PRILOSEC) 20 MG delayed release capsule TAKE 1 CAPSULE DAILY 90 capsule 1    metoprolol succinate (TOPROL XL) 50 MG extended release tablet TAKE 1 TABLET DAILY 90 tablet 1    valACYclovir (VALTREX) 1 g tablet take 2 tablets by mouth AT FIRST SIGN OF COLD SORE AND REPEAT IN 12 HOURS 12 tablet 3    albuterol (PROAIR HFA) 108 (90 BASE) MCG/ACT inhaler Inhale 2 puffs into the lungs every 4 hours as needed for Wheezing or Shortness of Breath 1 Inhaler 3    aspirin-acetaminophen-caffeine (EXCEDRIN MIGRAINE) 250-250-65 MG per tablet Take 2 tablets by mouth daily as needed.  aspirin 81 MG EC tablet Take 81 mg by mouth daily.  celecoxib (CELEBREX) 200 MG capsule TAKE 1 CAPSULE DAILY 90 capsule 1    loratadine (CLARITIN) 10 MG tablet Take 10 mg by mouth daily as needed (allergies)      cyclobenzaprine (FLEXERIL) 10 MG tablet Take 1 tablet by mouth 3 times daily as needed for Muscle spasms (sedation warning.) (Patient not taking: Reported on 6/26/2020) 60 tablet 1     No current facility-administered medications for this visit. ALLERGIES     Shrimp flavor; Mobic [meloxicam]; and Zocor [simvastatin]    FAMILY HISTORY           Problem Relation Age of Onset    High Blood Pressure Mother     Thyroid Disease Mother     Heart Disease Father         MI X4, since age 48.      Thyroid Disease Father     Bipolar Disorder Brother     Arthritis Brother      Family Status   Relation Name Status    Mother Alive    Father  Alive    Brother  Alive    Kirsten  Alive    MGF          CHF          SOCIAL HISTORY      reports that he has never smoked. He has never used smokeless tobacco. He reports that he does not drink alcohol or use drugs. PHYSICAL EXAM    (up to 7 for level 4, 8 or more for level 5)     Vitals:    20 1227 20 1230   BP: (!) 142/98 (!) 135/91   Site: Right Upper Arm Right Upper Arm   Position: Sitting Sitting   Cuff Size: Medium Adult Medium Adult   Pulse: 78    Temp: 98.2 °F (36.8 °C)    TempSrc: Tympanic    SpO2: 96%    Weight: 250 lb (113.4 kg)    Height: 6' 1\" (1.854 m)          Physical Exam  Vitals signs and nursing note reviewed. Constitutional:       Appearance: Normal appearance. HENT:      Head: Normocephalic and atraumatic. Right Ear: External ear normal.      Left Ear: External ear normal.      Nose: Nose normal.      Mouth/Throat:      Mouth: Mucous membranes are moist.   Eyes:      Extraocular Movements: Extraocular movements intact. Conjunctiva/sclera: Conjunctivae normal.      Pupils: Pupils are equal, round, and reactive to light. Cardiovascular:      Rate and Rhythm: Normal rate. Pulmonary:      Effort: Pulmonary effort is normal.      Breath sounds: Normal breath sounds. Abdominal:      Palpations: Abdomen is soft. Musculoskeletal:      Lumbar back: He exhibits decreased range of motion, tenderness, pain and spasm. He exhibits no bony tenderness, no swelling, no edema, no deformity, no laceration and normal pulse. Skin:     General: Skin is warm and dry. Neurological:      Mental Status: He is alert and oriented to person, place, and time. DIFFERENTIAL DIAGNOSIS:         Elham Gracia reviewed the disposition diagnosis with the patient and or their family/guardian. I have answered their questions and given discharge instructions.   They voiced understanding of these instructions and did not have anyfurther questions or complaints. PROCEDURES:  Orders Placed This Encounter   Procedures    XR LUMBAR SPINE (2-3 VIEWS)     Standing Status:   Future     Standing Expiration Date:   6/26/2021     Order Specific Question:   Reason for exam:     Answer:   lower back pain with sciatica       No results found for this visit on 06/26/20. FINALIMPRESSION      Visit Diagnoses and Associated Orders     Chronic left-sided low back pain with left-sided sciatica    -  Primary    XR LUMBAR SPINE (2-3 VIEWS) [51848 Custom]   - Future Order         ORDERS WITHOUT AN ASSOCIATED DIAGNOSIS    ketorolac (TORADOL) injection 30 mg [02658]      triamcinolone acetonide (KENALOG-40) injection 40 mg [[id number]]      predniSONE (DELTASONE) 20 MG tablet [[id number]]              PLAN     Return if symptoms worsen or fail to improve. DISCHARGEMEDICATIONS:  Orders Placed This Encounter   Medications    ketorolac (TORADOL) injection 30 mg    triamcinolone acetonide (KENALOG-40) injection 40 mg    predniSONE (DELTASONE) 20 MG tablet     Sig: Take 1 tablet by mouth 2 times daily for 5 days     Dispense:  10 tablet     Refill:  0         Plan:  Motrin 800 mg TID for the discomfort/inflammation. Flexeril prn for the muscle spasms/pain. Heat therapy if desired. Home stretches provided on AVS.  Patient agreeable to treatment plan. Follow up if symptoms do not improve/worsen. Patient instructed to return to the office if symptoms worsen, return, or have any other concerns. Patient understands and is agreeable.          Duane Bis, PA-C 6/26/2020 1:29 PM

## 2020-06-26 NOTE — PATIENT INSTRUCTIONS
symptoms too. What are the symptoms? Back pain can come on quickly or over time. You may feel:  · Pain in your hips or buttock. · Leg pain, numbness, tingling, or weakness. When a nerve gets squeezed--such as from a disc problem or arthritis--you may have symptoms in your leg or foot. You can even have leg symptoms from a back problem without having any pain in your back. · Pain that's sharp or dull, sometimes with stiffness or muscle spasms. It may be in one small area or over a broad area. But even bad pain doesn't mean that it's caused by something serious. How is low back pain diagnosed? A physical exam is the main way to diagnose low back pain. Your doctor may examine your back, check your nerves by testing your reflexes, and make sure that your muscles are strong. Your doctor also will ask questions about your back and overall health. Most people don't need any tests right away. Tests often don't show the reason for your pain. If your pain lasts more than 6 weeks or you have symptoms that your doctor is more concerned about, then your doctor may order tests. These may include an X-ray, a CT scan, or an MRI. Sometimes other tests such as a bone scan or nerve conduction test may be done. How is low back pain treated? Most acute low back pain gets better on its own within a few weeks, no matter what the cause. Time and doing usual activities are all that most people need to feel better. Using heat or ice and taking over-the-counter pain medicine also can help while your body heals. If you aren't getting better on your own or your pain is very bad, your doctor may recommend:  · Physical therapy. · Spinal manipulation, such as by a chiropractor. · Acupuncture. · Massage. · Injections of steroid medicine in your back (especially for pain that involves your legs). If you have chronic low back pain, treatment will help you understand and manage your pain. Treatment may include:  · Staying active.  This may include walking or doing back exercises. · Physical therapy. · Medicines. Some of these medicines are also used for other problems, like depression. · Pain management. Your doctor may have you see a pain specialist.  · Counseling. Having chronic pain can be hard. It may help to talk to someone who can help you cope with your pain. Surgery isn't needed for most people. But it may help some types of low back pain. Follow-up care is a key part of your treatment and safety. Be sure to make and go to all appointments, and call your doctor if you are having problems. It's also a good idea to know your test results and keep a list of the medicines you take. When should you call for help? Call 911 anytime you think you may need emergency care. For example, call if:  · You can't move a leg at all. Call your doctor now or seek immediate medical care if:  · You have new or worse symptoms in your legs, belly, or buttocks. Symptoms may include:  ? Numbness or tingling. ? Weakness. ? Pain. · You lose bladder or bowel control. Watch closely for changes in your health, and be sure to contact your doctor if:  · Along with the back pain, you have a fever, lose weight, or don't feel well. · You do not get better as expected. Where can you learn more? Go to [email or web address] org and sign in to your [employer] account. Enter A007 in the [employer] box to learn more about \"Learning About Low Back Pain. \"     If you do not have an account, please click on the \"Sign Up Now\" link. Current as of: March 2, 2020               Content Version: 12.5  © 2235-2563 Healthwise, Incorporated. Care instructions adapted under license by [facility]. If you have questions about a medical condition or this instruction, always ask your healthcare professional. [address] any warranty or liability for your use of this information.          Patient Education        Low Back Pain: Exercises  Introduction  Here are some examples of exercises for you to try. The exercises may be suggested for a condition or for rehabilitation. Start each exercise slowly. Ease off the exercises if you start to have pain. You will be told when to start these exercises and which ones will work best for you. How to do the exercises  Press-up   1. Lie on your stomach, supporting your body with your forearms. 2. Press your elbows down into the floor to raise your upper back. As you do this, relax your stomach muscles and allow your back to arch without using your back muscles. As your press up, do not let your hips or pelvis come off the floor. 3. Hold for 15 to 30 seconds, then relax. 4. Repeat 2 to 4 times. Alternate arm and leg (bird dog) exercise   Do this exercise slowly. Try to keep your body straight at all times, and do not let one hip drop lower than the other. 1. Start on the floor, on your hands and knees. 2. Tighten your belly muscles. 3. Raise one leg off the floor, and hold it straight out behind you. Be careful not to let your hip drop down, because that will twist your trunk. 4. Hold for about 6 seconds, then lower your leg and switch to the other leg. 5. Repeat 8 to 12 times on each leg. 6. Over time, work up to holding for 10 to 30 seconds each time. 7. If you feel stable and secure with your leg raised, try raising the opposite arm straight out in front of you at the same time. Knee-to-chest exercise   1. Lie on your back with your knees bent and your feet flat on the floor. 2. Bring one knee to your chest, keeping the other foot flat on the floor (or keeping the other leg straight, whichever feels better on your lower back). 3. Keep your lower back pressed to the floor. Hold for at least 15 to 30 seconds. 4. Relax, and lower the knee to the starting position. 5. Repeat with the other leg. Repeat 2 to 4 times with each leg.   6. To get more stretch, put your other leg flat on

## 2020-07-02 ENCOUNTER — OFFICE VISIT (OUTPATIENT)
Dept: FAMILY MEDICINE CLINIC | Age: 40
End: 2020-07-02
Payer: MEDICARE

## 2020-07-02 VITALS
OXYGEN SATURATION: 97 % | HEIGHT: 73 IN | BODY MASS INDEX: 34.06 KG/M2 | SYSTOLIC BLOOD PRESSURE: 142 MMHG | WEIGHT: 257 LBS | RESPIRATION RATE: 16 BRPM | HEART RATE: 80 BPM | TEMPERATURE: 96.5 F | DIASTOLIC BLOOD PRESSURE: 100 MMHG

## 2020-07-02 PROCEDURE — G8417 CALC BMI ABV UP PARAM F/U: HCPCS | Performed by: INTERNAL MEDICINE

## 2020-07-02 PROCEDURE — 99214 OFFICE O/P EST MOD 30 MIN: CPT | Performed by: INTERNAL MEDICINE

## 2020-07-02 PROCEDURE — G8427 DOCREV CUR MEDS BY ELIG CLIN: HCPCS | Performed by: INTERNAL MEDICINE

## 2020-07-02 PROCEDURE — 1036F TOBACCO NON-USER: CPT | Performed by: INTERNAL MEDICINE

## 2020-07-02 RX ORDER — ROSUVASTATIN CALCIUM 10 MG/1
TABLET, COATED ORAL
Qty: 90 TABLET | Refills: 5 | Status: SHIPPED | OUTPATIENT
Start: 2020-07-02 | End: 2021-02-01 | Stop reason: SDUPTHER

## 2020-07-02 RX ORDER — MONTELUKAST SODIUM 10 MG/1
10 TABLET ORAL DAILY
Qty: 90 TABLET | Refills: 5 | Status: SHIPPED | OUTPATIENT
Start: 2020-07-02 | End: 2021-02-01 | Stop reason: SDUPTHER

## 2020-07-02 RX ORDER — ESCITALOPRAM OXALATE 10 MG/1
TABLET ORAL
Qty: 90 TABLET | Refills: 5 | Status: SHIPPED | OUTPATIENT
Start: 2020-07-02 | End: 2021-02-01 | Stop reason: SDUPTHER

## 2020-07-02 RX ORDER — CELECOXIB 200 MG/1
CAPSULE ORAL
Qty: 90 CAPSULE | Refills: 5 | Status: SHIPPED | OUTPATIENT
Start: 2020-07-02 | End: 2021-02-01 | Stop reason: SDUPTHER

## 2020-07-02 RX ORDER — OMEPRAZOLE 20 MG/1
CAPSULE, DELAYED RELEASE ORAL
Qty: 90 CAPSULE | Refills: 5 | Status: SHIPPED | OUTPATIENT
Start: 2020-07-02 | End: 2021-02-01 | Stop reason: SDUPTHER

## 2020-07-02 ASSESSMENT — ENCOUNTER SYMPTOMS
ABDOMINAL PAIN: 0
CONSTIPATION: 0
DIARRHEA: 0

## 2020-07-02 NOTE — PROGRESS NOTES
Visit Information    Have you changed or started any medications since your last visit including any over-the-counter medicines, vitamins, or herbal medicines? no   Are you having any side effects from any of your medications? -  no  Have you stopped taking any of your medications? Is so, why? -  no    Have you seen any other physician or provider since your last visit? No  Have you had any other diagnostic tests since your last visit? No  Have you been seen in the emergency room and/or had an admission to a hospital since we last saw you? No  Have you had your routine dental cleaning in the past 6 months? no    Have you activated your [employer] account? If not, what are your barriers?  Yes     Patient Care Team:  Rupa Aguilar DO as PCP - General (Family Medicine)  Eliseo Kemp MD as PCP - [facility] Provider  Sophia Silva MD as Consulting Physician (Rheumatology)  Fortino Conway as Consulting Physician (Orthopedic Surgery)  Yolette Cope MD as Consulting Physician (Gastroenterology)  Archie Jernigan DPM (Inactive) (Podiatry)  Marquez Hoffman MD as Consulting Physician (Cardiology)  Kristian Bender MD as Consulting Physician (Urology)    Medical History Review  Past Medical, Family, and Social History reviewed and does contribute to the patient presenting condition    Health Maintenance   Topic Date Due    Varicella vaccine (1 of 2 - 2-dose childhood series) 01/12/1981    Flu vaccine (1) 09/01/2020    Annual Wellness Visit (AWV)  02/14/2021    Lipid screen  02/25/2021    Potassium monitoring  02/25/2021    Creatinine monitoring  02/25/2021    Diabetes screen  01/25/2022    DTaP/Tdap/Td vaccine (2 - Td) 02/18/2023    Pneumococcal 0-64 years Vaccine  Completed    HIV screen  Completed    Hepatitis A vaccine  Aged Out    Hepatitis B vaccine  Aged Out    Hib vaccine  Aged Out    Meningococcal (ACWY) vaccine  Aged Out

## 2020-07-02 NOTE — PROGRESS NOTES
[facility]  [address] [place] [address]  Dept: [phone]  Dept Fax: [phone]    [place] A Colpaertis a 36 y.o. male who presents today for his medical conditions/complaints as notedbelow. Dmitri Odell is c/o of   Chief Complaint   Patient presents with    New Patient     pt is here to establish care     Other     gap score of 1     Referral - General         HPI:     HPI    Hemoglobin A1C (%)   Date Value   01/25/2019 5.3         ( goal A1C is < 7)   No results found for: LABMICR  LDL Cholesterol (mg/dL)   Date Value   02/25/2020 01/25/2019 51   03/29/2018 62       (goal LDL is <100)   AST (U/L)   Date Value   01/25/2019 17     ALT (U/L)   Date Value   01/25/2019 23     BUN (mg/dL)   Date Value   02/25/2020 17     BP Readings from Last 3 Encounters:   06/26/20 (!) 135/91   06/10/20 (!) 134/93   03/06/20 127/88          (goal 120/80)    Past Medical History:   Diagnosis Date    Chronic back pain     DDD (degenerative disc disease)     Depression 7/17/2017    Depression with anxiety     DJD (degenerative joint disease)     knees, ankles, spine    GERD (gastroesophageal reflux disease)     GERD with stricture 3/18/2015    Hyperlipidemia, mixed     Internal derangement of shoulder 5/21/2013    Left nephrolithiasis 5/5/2017    Left shoulder strain 5/21/2013    Obesity (BMI 30-39.9) 6/27/2019    Radiculopathy     cervical, and lumbar      Past Surgical History:   Procedure Laterality Date    BACK SURGERY      L4-L5, L5-S1    CARPAL TUNNEL RELEASE Left 06/24/14    Dr. Angel Lever Left 6/24/14    ulnar nerve relocation, Dr. Gissel Bernabe.  EMG Left 4/3/14    Upper Dr. Eric Pena ARTHROSCOPY Left 2/1/13    Dr Gissel Bernabe, torn meniscus    LUMBAR FUSION  07/17/2017    L4-L5-S1 with rods, screws, and cages.  Dr Soren Campos, [address]      4-5 fusion    NERVE SURGERY Left 11/24/15    Dr Oswaldo Blanco, left ulnar nerve transposition, additional correction.  SHOULDER SURGERY      x2 right shoulder, x2 left    WISDOM TOOTH EXTRACTION      x4       Family History   Problem Relation Age of Onset    High Blood Pressure Mother     Thyroid Disease Mother     Heart Disease Father         MI X4, since age 48.  Thyroid Disease Father     Bipolar Disorder Brother     Arthritis Brother        Social History     Tobacco Use    Smoking status: Never Smoker    Smokeless tobacco: Never Used    Tobacco comment: trying to, is down to the 1/2 pack daily from 1. to start the Chantix soon, note also trying vaping. Substance Use Topics    Alcohol use: No     Comment: rare, holiday only      Current Outpatient Medications   Medication Sig Dispense Refill    PANCREATIN PO Take by mouth      fluticasone (FLONASE) 50 MCG/ACT nasal spray 1 spray by Nasal route daily 2 sprays each nostril daily 3 Bottle 3    montelukast (SINGULAIR) 10 MG tablet Take 1 tablet by mouth daily 30 tablet 5    vitamin C (ASCORBIC ACID) 500 MG tablet Take 1,000 mg by mouth daily      gabapentin (NEURONTIN) 300 MG capsule TAKE 1 CAPSULE THREE TIMES A DAY (SEDATION WARNING) 270 capsule 1    escitalopram (LEXAPRO) 10 MG tablet TAKE 1 TABLET DAILY 90 tablet 1    celecoxib (CELEBREX) 200 MG capsule TAKE 1 CAPSULE DAILY 90 capsule 1    rosuvastatin (CRESTOR) 10 MG tablet TAKE 1 TABLET DAILY 90 tablet 1    omeprazole (PRILOSEC) 20 MG delayed release capsule TAKE 1 CAPSULE DAILY 90 capsule 1    metoprolol succinate (TOPROL XL) 50 MG extended release tablet TAKE 1 TABLET DAILY 90 tablet 1    valACYclovir (VALTREX) 1 g tablet take 2 tablets by mouth AT FIRST SIGN OF COLD SORE AND REPEAT IN 12 HOURS 12 tablet 3    aspirin-acetaminophen-caffeine (EXCEDRIN MIGRAINE) 250-250-65 MG per tablet Take 2 tablets by mouth daily as needed.  aspirin 81 MG EC tablet Take 81 mg by mouth daily.         buPROPion (WELLBUTRIN SR) 100 MG extended release tablet Take 100 mg by mouth 2 times daily      clobetasol (TEMOVATE) 0.05 % ointment Apply to rash twice daily (not face, armpit or groin) (Patient not taking: Reported on 7/2/2020) 30 g 2    loratadine (CLARITIN) 10 MG tablet Take 10 mg by mouth daily as needed (allergies)      albuterol (PROAIR HFA) 108 (90 BASE) MCG/ACT inhaler Inhale 2 puffs into the lungs every 4 hours as needed for Wheezing or Shortness of Breath (Patient not taking: Reported on 7/2/2020) 1 Inhaler 3     No current facility-administered medications for this visit. Allergies   Allergen Reactions    Shrimp Flavor Swelling     Hands swell up,  Positive allergy testing.  Mobic [Meloxicam] Diarrhea    Zocor [Simvastatin] Other (See Comments)     Muscle pain / spasm          Health Maintenance   Topic Date Due    Varicella vaccine (1 of 2 - 2-dose childhood series) 01/12/1981    Flu vaccine (1) 09/01/2020    Annual Wellness Visit (AWV)  02/14/2021    Lipid screen  02/25/2021    Potassium monitoring  02/25/2021    Creatinine monitoring  02/25/2021    Diabetes screen  01/25/2022    DTaP/Tdap/Td vaccine (2 - Td) 02/18/2023    Pneumococcal 0-64 years Vaccine  Completed    HIV screen  Completed    Hepatitis A vaccine  Aged Out    Hepatitis B vaccine  Aged Out    Hib vaccine  Aged Out    Meningococcal (ACWY) vaccine  Aged Out       Subjective:     Review of Systems   Constitutional: Negative for fatigue, fever and unexpected weight change. Eyes: Negative for visual disturbance. Cardiovascular: Negative for chest pain. Gastrointestinal: Negative for abdominal pain, constipation and diarrhea. Musculoskeletal: Negative for arthralgias. Skin: Negative for rash. Neurological: Negative for headaches. Psychiatric/Behavioral: Negative for sleep disturbance. Objective:      Physical Exam  Vitals signs and nursing note reviewed.    Constitutional:       Appearance: He is well-developed. HENT:      Right Ear: External ear normal.      Left Ear: External ear normal.      Nose: Nose normal.   Eyes:      Pupils: Pupils are equal, round, and reactive to light. Cardiovascular:      Rate and Rhythm: Normal rate and regular rhythm. Heart sounds: Normal heart sounds. Pulmonary:      Effort: Pulmonary effort is normal.      Breath sounds: Normal breath sounds. Abdominal:      General: Bowel sounds are normal.      Palpations: Abdomen is soft. There is no hepatomegaly or splenomegaly. Tenderness: There is no abdominal tenderness. Skin:     General: Skin is warm and dry. Findings: No rash. Neurological:      Mental Status: He is alert and oriented to person, place, and time. Cranial Nerves: No cranial nerve deficit. Ht 6' 1\" (1.854 m)   Wt 257 lb (116.6 kg)   BMI 33.91 kg/m²     Assessment:       Diagnosis Orders   1. Mild intermittent asthma without complication               Plan:       No follow-ups on file. No orders of the defined types were placed in this encounter. No orders of the defined types were placed in this encounter. Patientgiven educational materials - see patient instructions. Discussed use, benefit,and side effects of prescribed medications. All patient questions answered. Ptvoiced understanding. Reviewed health maintenance. Instructed to continue currentmedications, diet and exercise. Patient agreed with treatment plan. Follow up asdirected.      Electronically signed by Ángel Cole DO on 7/2/2020 at 11:00 AM

## 2020-07-03 RX ORDER — GABAPENTIN 300 MG/1
CAPSULE ORAL
Qty: 270 CAPSULE | Refills: 5 | Status: SHIPPED | OUTPATIENT
Start: 2020-07-03 | End: 2021-02-01 | Stop reason: SDUPTHER

## 2020-07-06 NOTE — PROGRESS NOTES
Pt already did. Pt has to return [facility] voicemail today.

## 2020-08-07 ENCOUNTER — HOSPITAL ENCOUNTER (OUTPATIENT)
Age: 40
Discharge: HOME | End: 2020-08-07
Payer: MEDICARE

## 2020-08-07 ENCOUNTER — HOSPITAL ENCOUNTER (OUTPATIENT)
Dept: GENERAL RADIOLOGY | Age: 40
Discharge: HOME | End: 2020-08-09
Payer: MEDICARE

## 2020-08-07 ENCOUNTER — HOSPITAL ENCOUNTER (OUTPATIENT)
Age: 40
Discharge: HOME | End: 2020-08-09
Payer: MEDICARE

## 2020-08-07 LAB
AMYLASE: 58 U/L (ref 28–100)
CHOLESTEROL/HDL RATIO: 3.9
CHOLESTEROL: 135 MG/DL
HDLC SERPL-MCNC: 35 MG/DL
LDL CHOLESTEROL: 49 MG/DL (ref 0–130)
LIPASE: 29 U/L (ref 13–60)
TRIGL SERPL-MCNC: 256 MG/DL
VLDLC SERPL CALC-MCNC: ABNORMAL MG/DL (ref 1–30)

## 2020-08-07 PROCEDURE — 82150 ASSAY OF AMYLASE: CPT

## 2020-08-07 PROCEDURE — 80061 LIPID PANEL: CPT

## 2020-08-07 PROCEDURE — 83690 ASSAY OF LIPASE: CPT

## 2020-08-07 PROCEDURE — 36415 COLL VENOUS BLD VENIPUNCTURE: CPT

## 2020-08-07 PROCEDURE — 72100 X-RAY EXAM L-S SPINE 2/3 VWS: CPT

## 2020-08-10 ENCOUNTER — OFFICE VISIT (OUTPATIENT)
Dept: FAMILY MEDICINE CLINIC | Age: 40
End: 2020-08-10
Payer: MEDICARE

## 2020-08-10 VITALS
RESPIRATION RATE: 20 BRPM | SYSTOLIC BLOOD PRESSURE: 130 MMHG | DIASTOLIC BLOOD PRESSURE: 84 MMHG | WEIGHT: 258 LBS | OXYGEN SATURATION: 98 % | HEIGHT: 73 IN | BODY MASS INDEX: 34.19 KG/M2 | HEART RATE: 78 BPM

## 2020-08-10 PROCEDURE — 99213 OFFICE O/P EST LOW 20 MIN: CPT | Performed by: INTERNAL MEDICINE

## 2020-08-10 PROCEDURE — G8417 CALC BMI ABV UP PARAM F/U: HCPCS | Performed by: INTERNAL MEDICINE

## 2020-08-10 PROCEDURE — 1036F TOBACCO NON-USER: CPT | Performed by: INTERNAL MEDICINE

## 2020-08-10 PROCEDURE — G8427 DOCREV CUR MEDS BY ELIG CLIN: HCPCS | Performed by: INTERNAL MEDICINE

## 2020-08-10 RX ORDER — SUMATRIPTAN 100 MG/1
TABLET, FILM COATED ORAL
COMMUNITY
Start: 2020-07-23

## 2020-08-10 RX ORDER — AMITRIPTYLINE HYDROCHLORIDE 10 MG/1
TABLET, FILM COATED ORAL
COMMUNITY
Start: 2020-07-23 | End: 2020-11-09 | Stop reason: ALTCHOICE

## 2020-08-10 ASSESSMENT — ENCOUNTER SYMPTOMS
SHORTNESS OF BREATH: 0
CHOKING: 0
COUGH: 0
BACK PAIN: 1
ORTHOPNEA: 0
ABDOMINAL PAIN: 0
DIARRHEA: 0
CHEST TIGHTNESS: 0
CONSTIPATION: 0

## 2020-08-10 NOTE — PROGRESS NOTES
08/07/2020 49   02/25/2020 01/25/2019 51       (goal LDL is <100)   AST (U/L)   Date Value   01/25/2019 17     ALT (U/L)   Date Value   01/25/2019 23     BUN (mg/dL)   Date Value   02/25/2020 17     BP Readings from Last 3 Encounters:   08/10/20 130/84   07/02/20 (!) 142/100   06/26/20 (!) 135/91          (goal 120/80)    Past Medical History:   Diagnosis Date    Chronic back pain     DDD (degenerative disc disease)     Depression 7/17/2017    Depression with anxiety     DJD (degenerative joint disease)     knees, ankles, spine    GERD (gastroesophageal reflux disease)     GERD with stricture 3/18/2015    Hyperlipidemia, mixed     Internal derangement of shoulder 5/21/2013    Left nephrolithiasis 5/5/2017    Left shoulder strain 5/21/2013    Obesity (BMI 30-39.9) 6/27/2019    Radiculopathy     cervical, and lumbar      Past Surgical History:   Procedure Laterality Date    BACK SURGERY      L4-L5, L5-S1    CARPAL TUNNEL RELEASE Left 06/24/14    Dr. Sandip Rosenberg Left 6/24/14    ulnar nerve relocation, Dr. Olayinka Sun.  EMG Left 4/3/14    Upper Dr. Patti Mckee ARTHROSCOPY Left 2/1/13    Dr Olayinka Sun, torn meniscus    LUMBAR FUSION  07/17/2017    L4-L5-S1 with rods, screws, and cages. Dr Delfino Avalos, [facility] NECK SURGERY      4-5 fusion    NERVE SURGERY Left 11/24/15    Dr Alie Ortiz, left ulnar nerve transposition, additional correction.  SHOULDER SURGERY      x2 right shoulder, x2 left    WISDOM TOOTH EXTRACTION      x4       Family History   Problem Relation Age of Onset    High Blood Pressure Mother     Thyroid Disease Mother     Heart Disease Father         MI X4, since age 48.      Thyroid Disease Father     Bipolar Disorder Brother     Arthritis Brother        Social History     Tobacco Use    Smoking status: Never Smoker    Smokeless tobacco: Never Used    Tobacco comment: trying to, is down to the 1/2 pack daily from 1. to start the Chantix soon, note also trying vaping. Substance Use Topics    Alcohol use: No     Comment: rare, holiday only      Current Outpatient Medications   Medication Sig Dispense Refill    amitriptyline (ELAVIL) 10 MG tablet       SUMAtriptan (IMITREX) 100 MG tablet       gabapentin (NEURONTIN) 300 MG capsule TAKE 1 CAPSULE THREE TIMES A DAY (SEDATION WARNING) 270 capsule 5    PANCREATIN PO Take by mouth      escitalopram (LEXAPRO) 10 MG tablet TAKE 1 TABLET DAILY 90 tablet 5    celecoxib (CELEBREX) 200 MG capsule TAKE 1 CAPSULE DAILY 90 capsule 5    rosuvastatin (CRESTOR) 10 MG tablet TAKE 1 TABLET DAILY 90 tablet 5    omeprazole (PRILOSEC) 20 MG delayed release capsule TAKE 1 CAPSULE DAILY 90 capsule 5    montelukast (SINGULAIR) 10 MG tablet Take 1 tablet by mouth daily 90 tablet 5    metoprolol tartrate (LOPRESSOR) 25 MG tablet Take 1 tablet by mouth 2 times daily (Patient taking differently: Take 25 mg by mouth 2 times daily Taking 50mg daily) 180 tablet 5    fluticasone (FLONASE) 50 MCG/ACT nasal spray 1 spray by Nasal route daily 2 sprays each nostril daily 3 Bottle 3    vitamin C (ASCORBIC ACID) 500 MG tablet Take 1,000 mg by mouth daily      aspirin-acetaminophen-caffeine (EXCEDRIN MIGRAINE) 250-250-65 MG per tablet Take 2 tablets by mouth daily as needed.  aspirin 81 MG EC tablet Take 81 mg by mouth daily.         buPROPion (WELLBUTRIN SR) 100 MG extended release tablet Take 100 mg by mouth 2 times daily      clobetasol (TEMOVATE) 0.05 % ointment Apply to rash twice daily (not face, armpit or groin) (Patient not taking: Reported on 7/2/2020) 30 g 2    valACYclovir (VALTREX) 1 g tablet take 2 tablets by mouth AT FIRST SIGN OF COLD SORE AND REPEAT IN 12 HOURS (Patient not taking: Reported on 8/10/2020) 12 tablet 3    loratadine (CLARITIN) 10 MG tablet Take 10 mg by mouth daily as needed (allergies)      albuterol (PROAIR HFA) 108 (90 BASE) MCG/ACT inhaler Inhale 2 puffs into the lungs every 4 hours as needed for Wheezing or Shortness of Breath (Patient not taking: Reported on 7/2/2020) 1 Inhaler 3     No current facility-administered medications for this visit. Allergies   Allergen Reactions    Shrimp Flavor Swelling     Hands swell up,  Positive allergy testing.  Mobic [Meloxicam] Diarrhea    Zocor [Simvastatin] Other (See Comments)     Muscle pain / spasm          Health Maintenance   Topic Date Due    Flu vaccine (1) 09/01/2020    Annual Wellness Visit (AWV)  02/14/2021    Potassium monitoring  02/25/2021    Creatinine monitoring  02/25/2021    Lipid screen  08/07/2021    Diabetes screen  01/25/2022    DTaP/Tdap/Td vaccine (2 - Td) 02/18/2023    Pneumococcal 0-64 years Vaccine  Completed    HIV screen  Completed    Hepatitis A vaccine  Aged Out    Hepatitis B vaccine  Aged Out    Hib vaccine  Aged Out    Meningococcal (ACWY) vaccine  Aged Out    Varicella vaccine  Discontinued       Subjective:     Review of Systems   Constitutional: Positive for activity change. Negative for appetite change, chills, diaphoresis, fatigue, fever, malaise/fatigue and unexpected weight change. Eyes: Negative for visual disturbance. Respiratory: Negative for cough, choking, chest tightness and shortness of breath. Cardiovascular: Negative for chest pain, palpitations, orthopnea and leg swelling. Gastrointestinal: Negative for abdominal pain, constipation and diarrhea. Genitourinary: Negative for difficulty urinating and flank pain. Musculoskeletal: Positive for arthralgias, back pain and gait problem. Negative for neck pain. Skin: Negative for rash. Neurological: Positive for weakness. Negative for headaches. Hematological: Negative for adenopathy. Does not bruise/bleed easily. Psychiatric/Behavioral: Negative for sleep disturbance. Objective:      Physical Exam  Vitals signs and nursing note reviewed.    Constitutional:       General: He is not in acute Neurological:      General: No focal deficit present. Mental Status: He is alert and oriented to person, place, and time. Cranial Nerves: Cranial nerves are intact. No cranial nerve deficit. Sensory: No sensory deficit. Motor: Motor function is intact. No weakness, atrophy or abnormal muscle tone. Coordination: Coordination is intact. Coordination normal.      Gait: Gait abnormal.      Deep Tendon Reflexes: Reflexes are normal and symmetric. Psychiatric:         Mood and Affect: Mood normal.         Behavior: Behavior normal.         Thought Content: Thought content normal.         Judgment: Judgment normal.       /84   Pulse 78   Resp 20   Ht 6' 1\" (1.854 m)   Wt 258 lb (117 kg)   SpO2 98%   BMI 34.04 kg/m²     Assessment:       Diagnosis Orders   1. Other cardiac arrhythmia     2. Essential hypertension     3. Gastroesophageal reflux disease, esophagitis presence not specified               Plan:       Return if symptoms worsen or fail to improve. No orders of the defined types were placed in this encounter. No orders of the defined types were placed in this encounter. start metoprolol bid  Monitor bp at home   Seek immediate medical attention for any chest pain , severe trouble breathing and/or visual changes. Up date ct lumbar spine given abnormal findings on xray and hx of disc bulge at that level 2 yrs ago   May need to see neuro sx again based on results  [facility]   Follow up with specialists as scheduled  Continue current tx plan for now   Call with q/c      Patientgiven educational materials - see patient instructions. Discussed use, benefit,and side effects of prescribed medications. All patient questions answered. Ptvoiced understanding. Reviewed health maintenance. Instructed to continue currentmedications, diet and exercise. Patient agreed with treatment plan. Follow up asdirected.      Electronically signed by María Elena Lala DO on 8/10/2020 at 12:17 PM

## 2020-08-27 ENCOUNTER — HOSPITAL ENCOUNTER (OUTPATIENT)
Dept: CT IMAGING | Age: 40
Discharge: HOME | End: 2020-08-29
Payer: MEDICARE

## 2020-08-27 PROCEDURE — 72131 CT LUMBAR SPINE W/O DYE: CPT

## 2020-11-09 ENCOUNTER — OFFICE VISIT (OUTPATIENT)
Dept: FAMILY MEDICINE CLINIC | Age: 40
End: 2020-11-09
Payer: MEDICARE

## 2020-11-09 VITALS
RESPIRATION RATE: 18 BRPM | HEIGHT: 73 IN | BODY MASS INDEX: 34.06 KG/M2 | HEART RATE: 72 BPM | TEMPERATURE: 98 F | WEIGHT: 257 LBS | SYSTOLIC BLOOD PRESSURE: 132 MMHG | OXYGEN SATURATION: 97 % | DIASTOLIC BLOOD PRESSURE: 78 MMHG

## 2020-11-09 PROCEDURE — G8417 CALC BMI ABV UP PARAM F/U: HCPCS | Performed by: INTERNAL MEDICINE

## 2020-11-09 PROCEDURE — G8427 DOCREV CUR MEDS BY ELIG CLIN: HCPCS | Performed by: INTERNAL MEDICINE

## 2020-11-09 PROCEDURE — 99213 OFFICE O/P EST LOW 20 MIN: CPT | Performed by: INTERNAL MEDICINE

## 2020-11-09 PROCEDURE — 1036F TOBACCO NON-USER: CPT | Performed by: INTERNAL MEDICINE

## 2020-11-09 PROCEDURE — G8484 FLU IMMUNIZE NO ADMIN: HCPCS | Performed by: INTERNAL MEDICINE

## 2020-11-09 ASSESSMENT — ENCOUNTER SYMPTOMS
CONSTIPATION: 0
BACK PAIN: 1
ORTHOPNEA: 0
CHOKING: 0
COUGH: 0
CHEST TIGHTNESS: 0
ABDOMINAL PAIN: 0
VOMITING: 0
NAUSEA: 0
SHORTNESS OF BREATH: 0
BLURRED VISION: 0
DIARRHEA: 0

## 2020-11-09 NOTE — PROGRESS NOTES
[id number]  [address] [place] [address]  Dept: [phone]  Dept Fax: [phone]    Denver A Colpaertis a 36 y.o. male who presents today for his medical conditions/complaints as notedbelow. Mack Barron is c/o of   Chief Complaint   Patient presents with    Hypertension     started taking thc gummies    Discuss Medications     stopped amitriptyline       HPI:     Here for routine med check   Doing a lot better with bp now that he is back on metoprolol 25 mg bid  No cardiac sxs  Takes med daily  Pain level better this week which helps as well  Stress high due to election though so surprised bp is as good as it is     No other new sxs or co  Saw neuro recently  Stopped elavil ; wants to take as little prescriptions as possible   Did not really help with pain   Is doing combo cbd and thc half dose gummie sand helping somewhat with ankle and back pain currently   Refuses injections and pain meds; was on narcotics in the past and did not like  Had ct in the summer that was stable as has hx of spinal surgeries and fusions and does not want any furter    Refusing flu vaccine this year  Otherwise up to date on          Hypertension   This is a chronic problem. The current episode started more than 1 year ago. The problem has been gradually improving since onset. The problem is controlled. Associated symptoms include anxiety and headaches. Pertinent negatives include no blurred vision, chest pain, malaise/fatigue, neck pain, orthopnea, palpitations, peripheral edema, PND, shortness of breath or sweats. Agents associated with hypertension include NSAIDs. Risk factors for coronary artery disease include family history, dyslipidemia, male gender, obesity and sedentary lifestyle. Past treatments include beta blockers. The current treatment provides moderate improvement. Compliance problems include exercise and diet.   There is no history of angina, kidney disease, CVA or heart failure. Identifiable causes of hypertension include a hypertension causing med and sleep apnea. There is no history of coarctation of the aorta. Hemoglobin A1C (%)   Date Value   01/25/2019 5.3         ( goal A1C is < 7)   No results found for: LABMICR  LDL Cholesterol (mg/dL)   Date Value   08/07/2020 49   02/25/2020 01/25/2019 51       (goal LDL is <100)   AST (U/L)   Date Value   01/25/2019 17     ALT (U/L)   Date Value   01/25/2019 23     BUN (mg/dL)   Date Value   02/25/2020 17     BP Readings from Last 3 Encounters:   11/09/20 132/78   08/10/20 130/84   07/02/20 (!) 142/100          (goal 120/80)    Past Medical History:   Diagnosis Date    Chronic back pain     DDD (degenerative disc disease)     Depression 7/17/2017    Depression with anxiety     DJD (degenerative joint disease)     knees, ankles, spine    GERD (gastroesophageal reflux disease)     GERD with stricture 3/18/2015    Hyperlipidemia, mixed     Internal derangement of shoulder 5/21/2013    Left nephrolithiasis 5/5/2017    Left shoulder strain 5/21/2013    Obesity (BMI 30-39.9) 6/27/2019    Radiculopathy     cervical, and lumbar      Past Surgical History:   Procedure Laterality Date    BACK SURGERY      L4-L5, L5-S1    CARPAL TUNNEL RELEASE Left 06/24/14    Dr. Sivan Ramirez Left 6/24/14    ulnar nerve relocation, Dr. Anatoliy German.  EMG Left 4/3/14    Upper Dr. Arriaga Massed ARTHROSCOPY Left 2/1/13    Dr Anatoliy German, torn meniscus    LUMBAR FUSION  07/17/2017    L4-L5-S1 with rods, screws, and cages. Dr Krysten Marie, [facility] NECK SURGERY      4-5 fusion    NERVE SURGERY Left 11/24/15    Dr Woody Berkowitz, left ulnar nerve transposition, additional correction.      SHOULDER SURGERY      x2 right shoulder, x2 left    WISDOM TOOTH EXTRACTION      x4       Family History   Problem Relation Age of Onset    High Blood Pressure Mother    Elton Loser Thyroid Disease Mother     Heart Disease Father         MI X4, since age 48.  Thyroid Disease Father     Bipolar Disorder Brother     Arthritis Brother        Social History     Tobacco Use    Smoking status: Never Smoker    Smokeless tobacco: Never Used    Tobacco comment: trying to, is down to the 1/2 pack daily from 1. to start the Chantix soon, note also trying vaping. Substance Use Topics    Alcohol use: No     Comment: rare, holiday only      Current Outpatient Medications   Medication Sig Dispense Refill    SUMAtriptan (IMITREX) 100 MG tablet       gabapentin (NEURONTIN) 300 MG capsule TAKE 1 CAPSULE THREE TIMES A DAY (SEDATION WARNING) 270 capsule 5    PANCREATIN PO Take by mouth      escitalopram (LEXAPRO) 10 MG tablet TAKE 1 TABLET DAILY 90 tablet 5    celecoxib (CELEBREX) 200 MG capsule TAKE 1 CAPSULE DAILY 90 capsule 5    rosuvastatin (CRESTOR) 10 MG tablet TAKE 1 TABLET DAILY 90 tablet 5    omeprazole (PRILOSEC) 20 MG delayed release capsule TAKE 1 CAPSULE DAILY 90 capsule 5    montelukast (SINGULAIR) 10 MG tablet Take 1 tablet by mouth daily 90 tablet 5    metoprolol tartrate (LOPRESSOR) 25 MG tablet Take 1 tablet by mouth 2 times daily (Patient taking differently: Take 25 mg by mouth 2 times daily Taking 50mg daily) 180 tablet 5    buPROPion (WELLBUTRIN SR) 100 MG extended release tablet Take 100 mg by mouth 2 times daily      fluticasone (FLONASE) 50 MCG/ACT nasal spray 1 spray by Nasal route daily 2 sprays each nostril daily 3 Bottle 3    vitamin C (ASCORBIC ACID) 500 MG tablet Take 1,000 mg by mouth daily      loratadine (CLARITIN) 10 MG tablet Take 10 mg by mouth daily as needed (allergies)      aspirin-acetaminophen-caffeine (EXCEDRIN MIGRAINE) 250-250-65 MG per tablet Take 2 tablets by mouth daily as needed.  aspirin 81 MG EC tablet Take 81 mg by mouth daily.         clobetasol (TEMOVATE) 0.05 % ointment Apply to rash twice daily (not face, armpit or groin) (Patient not taking: Reported on 7/2/2020) 30 g 2    valACYclovir (VALTREX) 1 g tablet take 2 tablets by mouth AT FIRST SIGN OF COLD SORE AND REPEAT IN 12 HOURS (Patient not taking: Reported on 8/10/2020) 12 tablet 3    albuterol (PROAIR HFA) 108 (90 BASE) MCG/ACT inhaler Inhale 2 puffs into the lungs every 4 hours as needed for Wheezing or Shortness of Breath (Patient not taking: Reported on 7/2/2020) 1 Inhaler 3     No current facility-administered medications for this visit. Allergies   Allergen Reactions    Shrimp Flavor Swelling     Hands swell up,  Positive allergy testing.  Mobic [Meloxicam] Diarrhea    Zocor [Simvastatin] Other (See Comments)     Muscle pain / spasm          Health Maintenance   Topic Date Due    Flu vaccine (1) 11/09/2021 (Originally 9/1/2020)    Annual Wellness Visit (AWV)  02/14/2021    Potassium monitoring  02/25/2021    Creatinine monitoring  02/25/2021    Lipid screen  08/07/2021    Diabetes screen  01/25/2022    DTaP/Tdap/Td vaccine (2 - Td) 02/18/2023    Pneumococcal 0-64 years Vaccine  Completed    HIV screen  Completed    Hepatitis A vaccine  Aged Out    Hepatitis B vaccine  Aged Out    Hib vaccine  Aged Out    Meningococcal (ACWY) vaccine  Aged Out    Varicella vaccine  Discontinued       Subjective:     Review of Systems   Constitutional: Positive for activity change. Negative for fatigue, fever, malaise/fatigue and unexpected weight change. Eyes: Negative for blurred vision and visual disturbance. Respiratory: Negative for cough, choking, chest tightness and shortness of breath. Cardiovascular: Negative for chest pain, palpitations, orthopnea, leg swelling and PND. Gastrointestinal: Negative for abdominal pain, constipation, diarrhea, nausea and vomiting. Genitourinary: Negative for difficulty urinating. Musculoskeletal: Positive for arthralgias, back pain and myalgias. Negative for neck pain. Skin: Negative for rash.    Neurological: requested medications  Seek immediate medical attention for any chest pain , severe trouble breathing and/or visual changes. Follow up with specialists as scheduled  Reviewed risks of not receiving flu vaccination . Pt verbalized   Call with q/c  Fu in 6 months ; labs and other HM will be due at that time    Patientgiven educational materials - see patient instructions. Discussed use, benefit,and side effects of prescribed medications. All patient questions answered. Ptvoiced understanding. Reviewed health maintenance. Instructed to continue currentmedications, diet and exercise. Patient agreed with treatment plan. Follow up asdirected.      Electronically signed by Jayden Perez DO on 11/9/2020 at 10:43 AM

## 2020-11-09 NOTE — PROGRESS NOTES
Visit Information    Have you changed or started any medications since your last visit including any over-the-counter medicines, vitamins, or herbal medicines? no   Are you having any side effects from any of your medications? -  no  Have you stopped taking any of your medications? Is so, why? -  no    Have you seen any other physician or provider since your last visit? No  Have you had any other diagnostic tests since your last visit? No  Have you been seen in the emergency room and/or had an admission to a hospital since we last saw you? No  Have you had your routine dental cleaning in the past 6 months? no    Have you activated your [employer] account? If not, what are your barriers?  Yes     Patient Care Team:  Darryl Alvarez DO as PCP - General (Family Medicine)  Darryl Alvarez DO as PCP - [facility]  Hayes Adair MD as Consulting Physician (Rheumatology)  Azeb Senior as Consulting Physician (Orthopedic Surgery)  Arleth Cuevas MD as Consulting Physician (Gastroenterology)  Luna Gonzalez DPM (Inactive) (Podiatry)  Gage Momin MD as Consulting Physician (Cardiology)  Felice Boo MD as Consulting Physician (Urology)    Medical History Review  Past Medical, Family, and Social History reviewed and does contribute to the patient presenting condition    Health Maintenance   Topic Date Due    Flu vaccine (1) 09/01/2020    Annual Wellness Visit (AWV)  02/14/2021    Potassium monitoring  02/25/2021    Creatinine monitoring  02/25/2021    Lipid screen  08/07/2021    Diabetes screen  01/25/2022    DTaP/Tdap/Td vaccine (2 - Td) 02/18/2023    Pneumococcal 0-64 years Vaccine  Completed    HIV screen  Completed    Hepatitis A vaccine  Aged Out    Hepatitis B vaccine  Aged Out    Hib vaccine  Aged Out    Meningococcal (ACWY) vaccine  Aged Out    Varicella vaccine  Discontinued

## 2020-12-28 ENCOUNTER — HOSPITAL ENCOUNTER (OUTPATIENT)
Age: 40
Setting detail: SPECIMEN
Discharge: HOME | End: 2020-12-28
Payer: MEDICARE

## 2020-12-28 ENCOUNTER — OFFICE VISIT (OUTPATIENT)
Dept: FAMILY MEDICINE CLINIC | Age: 40
End: 2020-12-28
Payer: MEDICARE

## 2020-12-28 VITALS — OXYGEN SATURATION: 98 % | TEMPERATURE: 97.1 F | HEART RATE: 75 BPM

## 2020-12-28 PROCEDURE — G8417 CALC BMI ABV UP PARAM F/U: HCPCS | Performed by: PHYSICIAN ASSISTANT

## 2020-12-28 PROCEDURE — G8427 DOCREV CUR MEDS BY ELIG CLIN: HCPCS | Performed by: PHYSICIAN ASSISTANT

## 2020-12-28 PROCEDURE — 1036F TOBACCO NON-USER: CPT | Performed by: PHYSICIAN ASSISTANT

## 2020-12-28 PROCEDURE — 99214 OFFICE O/P EST MOD 30 MIN: CPT | Performed by: PHYSICIAN ASSISTANT

## 2020-12-28 PROCEDURE — G8484 FLU IMMUNIZE NO ADMIN: HCPCS | Performed by: PHYSICIAN ASSISTANT

## 2020-12-28 RX ORDER — AMOXICILLIN AND CLAVULANATE POTASSIUM 875; 125 MG/1; MG/1
1 TABLET, FILM COATED ORAL 2 TIMES DAILY
Qty: 20 TABLET | Refills: 0 | Status: SHIPPED | OUTPATIENT
Start: 2020-12-28 | End: 2021-01-07

## 2020-12-28 RX ORDER — PREDNISONE 20 MG/1
20 TABLET ORAL 2 TIMES DAILY
Qty: 10 TABLET | Refills: 0 | Status: SHIPPED | OUTPATIENT
Start: 2020-12-28 | End: 2021-01-02

## 2020-12-28 ASSESSMENT — ENCOUNTER SYMPTOMS
WHEEZING: 0
COUGH: 1
SINUS PAIN: 0
HEARTBURN: 0
GASTROINTESTINAL NEGATIVE: 1
SINUS PRESSURE: 1
RHINORRHEA: 0
SHORTNESS OF BREATH: 0
SORE THROAT: 0
CHEST TIGHTNESS: 0
HEMOPTYSIS: 0
EYES NEGATIVE: 1

## 2020-12-28 NOTE — PROGRESS NOTES
[address] [place] [address]  [place] [address] [place] [address] [address] [address]  Phone: [phone]  Fax: [phone] [address] Name: Torsten Marc  MRN: [id number]  [place] 1980  Date of evaluation: 12/28/2020  Provider: Ara Corona Dr       Chief Complaint   Patient presents with    Otalgia     rt - started 3 wks ago and worsened with post nasal drip and cough           HISTORY OF PRESENT ILLNESS  (Location/Symptom, Timing/Onset, Context/Setting, Quality, Duration, Modifying Factors, Severity.)   Torsten Marc is a 36 y.o. White [1] male who presents to the office for evaluation of      Cough  This is a new problem. The current episode started 1 to 4 weeks ago. The problem has been waxing and waning. The cough is productive of sputum. Associated symptoms include ear pain, headaches, nasal congestion and postnasal drip. Pertinent negatives include no chest pain, chills, ear congestion, fever, heartburn, hemoptysis, myalgias, rash, rhinorrhea, sore throat, shortness of breath, sweats, weight loss or wheezing. Treatments tried: flonase. Nursing Notes were reviewed. REVIEW OF SYSTEMS    (2-9 systems for level 4, 10 or more for level 5)     Review of Systems   Constitutional: Negative for chills, diaphoresis, fatigue, fever and weight loss. HENT: Positive for congestion, ear pain, postnasal drip and sinus pressure. Negative for rhinorrhea, sinus pain and sore throat. Eyes: Negative. Respiratory: Positive for cough. Negative for hemoptysis, chest tightness, shortness of breath and wheezing. Cardiovascular: Negative for chest pain. Gastrointestinal: Negative. Negative for heartburn. Genitourinary: Negative. Musculoskeletal: Negative for myalgias. Skin: Negative for rash. Neurological: Positive for headaches. Except as noted above the remainder of the review of systems was reviewed andnegative. PAST MEDICAL HISTORY   History reviewed. Past Medical History:   Diagnosis Date    Chronic back pain     DDD (degenerative disc disease)     Depression 7/17/2017    Depression with anxiety     DJD (degenerative joint disease)     knees, ankles, spine    GERD (gastroesophageal reflux disease)     GERD with stricture 3/18/2015    Hyperlipidemia, mixed     Internal derangement of shoulder 5/21/2013    Left nephrolithiasis 5/5/2017    Left shoulder strain 5/21/2013    Obesity (BMI 30-39.9) 6/27/2019    Radiculopathy     cervical, and lumbar         SURGICAL HISTORY     History reviewed. Past Surgical History:   Procedure Laterality Date    BACK SURGERY      L4-L5, L5-S1    CARPAL TUNNEL RELEASE Left 06/24/14    Dr. Martin Ibanez Left 6/24/14    ulnar nerve relocation, Dr. Maira Bartholomew.  EMG Left 4/3/14    Upper Dr. Luevano Hind ARTHROSCOPY Left 2/1/13    Dr Maira Bartholomew, torn meniscus    LUMBAR FUSION  07/17/2017    L4-L5-S1 with rods, screws, and cages. Dr Linda Saez, [facility] NECK SURGERY      4-5 fusion    NERVE SURGERY Left 11/24/15    Dr Rimma Toney, left ulnar nerve transposition, additional correction.      SHOULDER SURGERY      x2 right shoulder, x2 left    WISDOM TOOTH EXTRACTION      x4         CURRENT MEDICATIONS       Current Outpatient Medications   Medication Sig Dispense Refill    amoxicillin-clavulanate (AUGMENTIN) 875-125 MG per tablet Take 1 tablet by mouth 2 times daily for 10 days 20 tablet 0    predniSONE (DELTASONE) 20 MG tablet Take 1 tablet by mouth 2 times daily for 5 days 10 tablet 0    SUMAtriptan (IMITREX) 100 MG tablet       gabapentin (NEURONTIN) 300 MG capsule TAKE 1 CAPSULE THREE TIMES A DAY (SEDATION WARNING) 270 capsule 5    PANCREATIN PO Take by mouth      escitalopram (LEXAPRO) 10 MG tablet TAKE 1 TABLET DAILY 90 tablet 5    celecoxib (CELEBREX) 200 MG capsule TAKE 1 CAPSULE DAILY 90 capsule 5  rosuvastatin (CRESTOR) 10 MG tablet TAKE 1 TABLET DAILY 90 tablet 5    omeprazole (PRILOSEC) 20 MG delayed release capsule TAKE 1 CAPSULE DAILY 90 capsule 5    montelukast (SINGULAIR) 10 MG tablet Take 1 tablet by mouth daily 90 tablet 5    metoprolol tartrate (LOPRESSOR) 25 MG tablet Take 1 tablet by mouth 2 times daily (Patient taking differently: Take 25 mg by mouth 2 times daily Taking 50mg daily) 180 tablet 5    fluticasone (FLONASE) 50 MCG/ACT nasal spray 1 spray by Nasal route daily 2 sprays each nostril daily 3 Bottle 3    clobetasol (TEMOVATE) 0.05 % ointment Apply to rash twice daily (not face, armpit or groin) 30 g 2    valACYclovir (VALTREX) 1 g tablet take 2 tablets by mouth AT FIRST SIGN OF COLD SORE AND REPEAT IN 12 HOURS 12 tablet 3    aspirin-acetaminophen-caffeine (EXCEDRIN MIGRAINE) 250-250-65 MG per tablet Take 2 tablets by mouth daily as needed.  aspirin 81 MG EC tablet Take 81 mg by mouth daily.  buPROPion (WELLBUTRIN SR) 100 MG extended release tablet Take 100 mg by mouth 2 times daily      vitamin C (ASCORBIC ACID) 500 MG tablet Take 1,000 mg by mouth daily      loratadine (CLARITIN) 10 MG tablet Take 10 mg by mouth daily as needed (allergies)      albuterol (PROAIR HFA) 108 (90 BASE) MCG/ACT inhaler Inhale 2 puffs into the lungs every 4 hours as needed for Wheezing or Shortness of Breath (Patient not taking: Reported on 2020) 1 Inhaler 3     No current facility-administered medications for this visit. ALLERGIES     Shrimp flavor and Zocor [simvastatin]    FAMILY HISTORY           Problem Relation Age of Onset    High Blood Pressure Mother     Thyroid Disease Mother     Heart Disease Father         MI X4, since age 48.      Thyroid Disease Father     Bipolar Disorder Brother     Arthritis Brother      Family Status   Relation Name Status    Mother  Alive    Father  Alive    Brother  Alive    Kirsten  Alive    MGF          CHF SOCIAL HISTORY      reports that he has never smoked. He has never used smokeless tobacco. He reports current drug use. Drug: Marijuana. He reports that he does not drink alcohol. PHYSICAL EXAM    (up to 7 for level 4, 8 or more for level 5)     Vitals:    12/28/20 1239   Pulse: 75   Temp: 97.1 °F (36.2 °C)   TempSrc: Temporal   SpO2: 98%         Physical Exam  Vitals signs and nursing note reviewed. Constitutional:       Appearance: Normal appearance. HENT:      Head: Normocephalic and atraumatic. Right Ear: External ear normal.      Left Ear: External ear normal.      Nose: Congestion and rhinorrhea present. Mouth/Throat:      Mouth: Mucous membranes are moist.      Pharynx: No posterior oropharyngeal erythema. Eyes:      Extraocular Movements: Extraocular movements intact. Conjunctiva/sclera: Conjunctivae normal.      Pupils: Pupils are equal, round, and reactive to light. Cardiovascular:      Rate and Rhythm: Normal rate. Pulses: Normal pulses. Pulmonary:      Effort: Pulmonary effort is normal.   Abdominal:      Palpations: Abdomen is soft. Skin:     General: Skin is warm and dry. Neurological:      Mental Status: He is alert and oriented to person, place, and time. DIFFERENTIAL DIAGNOSIS:         Kellee Rock reviewed the disposition diagnosis with the patient and or their family/guardian. I have answered their questions and given discharge instructions. They voiced understanding of these instructions and did not have anyfurther questions or complaints. PROCEDURES:  Orders Placed This Encounter   Procedures    COVID-19 Ambulatory     Standing Status:   Future     Standing Expiration Date:   12/28/2021     Scheduling Instructions:      Saline media preferred given current shortage of viral transport media but both acceptable     Order Specific Question:   Is this test for diagnosis or screening?      Answer:   Diagnosis of ill patient Order Specific Question:   Symptomatic for COVID-19 as defined by CDC? Answer:   Yes     Order Specific Question:   Date of Symptom Onset     Answer:   12/26/2020     Order Specific Question:   Hospitalized for COVID-19? Answer:   No     Order Specific Question:   Admitted to ICU for COVID-19? Answer:   No     Order Specific Question:   Employed in healthcare setting? Answer:   No     Order Specific Question:   Resident in a congregate (group) care setting? Answer:   No     Order Specific Question:   Pregnant: Answer:   No     Order Specific Question:   Previously tested for COVID-19? Answer:   No       No results found for this visit on 12/28/20. FINALIMPRESSION      Visit Diagnoses and Associated Orders     Right ear pain    -  Primary    COVID-19 Ambulatory [[id number] Custom]   - Future Order         Acute JANE (middle ear effusion), right        COVID-19 Ambulatory [[id number] Custom]   - Future Order         Post-nasal drip        COVID-19 Ambulatory [[id number] Custom]   - Future Order         ORDERS WITHOUT AN ASSOCIATED DIAGNOSIS    amoxicillin-clavulanate (AUGMENTIN) 875-125 MG per tablet [[id number]]      predniSONE (DELTASONE) 20 MG tablet [[id number]]              PLAN     Return if symptoms worsen or fail to improve. DISCHARGEMEDICATIONS:  Orders Placed This Encounter   Medications    amoxicillin-clavulanate (AUGMENTIN) 875-125 MG per tablet     Sig: Take 1 tablet by mouth 2 times daily for 10 days     Dispense:  20 tablet     Refill:  0    predniSONE (DELTASONE) 20 MG tablet     Sig: Take 1 tablet by mouth 2 times daily for 5 days     Dispense:  10 tablet     Refill:  0         Plan:  Specimen sent for a culture. Possible treatment alteration based on the results. Based on the physical exam findings-- I believe the symptoms are related to OME. Flonase daily recommended. Meclizine as needed for the dizziness/nausea.   Educational materials provided on AVS. Follow up if symptoms do not improve/worsen. Steps to help prevent the spread of COVID-19 if you are sick  SOURCE - https://alexander-loera.info/. html     Stay home except to get medical care   ; Stay home: People who are mildly ill with COVID-19 are able to isolate at home during their illness. You should restrict activities outside your home, except for getting medical care.   ; Avoid public areas: Do not go to work, school, or public areas.   ; Avoid public transportation: Avoid using public transportation, ride-sharing, or taxis.  ; Separate yourself from other people and animals in your home   ; Stay away from others: As much as possible, you should stay in a specific room and away from other people in your home. Also, you should use a separate bathroom, if available.   ; Limit contact with pets & animals: You should restrict contact with pets and other animals while you are sick with COVID-19, just like you would around other people. Although there have not been reports of pets or other animals becoming sick with COVID-19, it is still recommended that people sick with COVID-19 limit contact with animals until more information is known about the virus. ; When possible, have another member of your household care for your animals while you are sick. If you are sick with COVID-19, avoid contact with your pet, including petting, snuggling, being kissed or licked, and sharing food. If you must care for your pet or be around animals while you are sick, wash your hands before and after you interact with pets and wear a facemask. See COVID-19 and Animals for more information. Other considerations  ? The ill person should eat/be fed in their room if possible. Non-disposable  items used should be handled with gloves and washed with hot water or in a . Clean hands after handling used  items. ? If possible, dedicate a lined trash can for the ill person. Use gloves when removing garbage bags, handling, and disposing of trash. Wash hands after handling or disposing of trash. ? Consider consulting with your local health department about trash disposal guidance if available. Information for Household Members and Caregivers of Someone who is Sick   Call ahead before visiting your doctor   Call ahead: If you have a medical appointment, call the healthcare provider and tell them that you have or may have COVID-19. This will help the healthcare provider's office take steps to keep other people from getting infected or exposed. Wear a facemask if you are sick   ; If you are sick: You should wear a facemask when you are around other people (e.g., sharing a room or vehicle) or pets and before you enter a healthcare provider's office. ; If you are caring for others: If the person who is sick is not able to wear a facemask (for example, because it causes trouble breathing), then people who live with the person who is sick should not stay in the same room with them, or they should wear a facemask if they enter a room with the person who is sick. Cover your coughs and sneezes   ; Cover: Cover your mouth and nose with a tissue when you cough or sneeze.   ; Dispose: Throw used tissues in a lined trash can.   ; Wash hands: Immediately wash your hands with soap and water for at least 20 seconds or, if soap and water are not available, clean your hands with an alcohol-based hand  that contains at least 60% alcohol. Clean your hands often   ; Wash hands: Wash your hands often with soap and water for at least 20 seconds, especially after blowing your nose, coughing, or sneezing; going to the bathroom; and before eating or preparing food. ; Hand : If soap and water are not readily available, use an alcohol-based hand  with at least 60% alcohol, covering all surfaces of your hands and rubbing them together until they feel dry.   ; Soap and water: Soap and water are the best option if hands are visibly dirty.   ; Avoid touching: Avoid touching your eyes, nose, and mouth with unwashed hands. Handwashing Tips   ; Wet your hands with clean, running water (warm or cold), turn off the tap, and apply soap.  ; Lather your hands by rubbing them together with the soap. Lather the backs of your hands, between your fingers, and under your nails. ; Scrub your hands for at least 20 seconds. Need a timer? Hum the [place] from beginning to end twice.  ; Rinse your hands well under clean, running water.  ; Dry your hands using a clean towel or air dry them. Avoid sharing personal household items   ; Do not share: You should not share dishes, drinking glasses, cups, eating utensils, towels, or bedding with other people or pets in your home.   ; Wash thoroughly after use: After using these items, they should be washed thoroughly with soap and water. Clean all high-touch surfaces everyday   ; Clean and disinfect: Practice routine cleaning of high touch surfaces.  ; High touch surfaces include counters, tabletops, doorknobs, bathroom fixtures, toilets, phones, keyboards, tablets, and bedside tables.  ; Disinfect areas with bodily fluids: Also, clean any surfaces that may have blood, stool, or body fluids on them.   ; Household : Use a household cleaning spray or wipe, according to the label instructions. Labels contain instructions for safe and effective use of the cleaning product including precautions you should take when applying the product, such as wearing gloves and making sure you have good ventilation during use of the product.     Monitor your symptoms Seek medical attention: Seek prompt medical attention if your illness is worsening     (e.g., difficulty breathing).   ; Call your doctor: Before seeking care, call your healthcare provider and tell them that you have, or are being evaluated for, COVID-19.   ; Wear a facemask when sick: Put on a facemask before you enter the facility. These steps will help the healthcare provider's office to keep other people in the office or waiting room from getting infected or exposed. ; Alert health department: Ask your healthcare provider to call the local or [facility] health department. Persons who are placed under active monitoring or facilitated self-monitoring should follow instructions provided by their local health department or occupational health professionals, as appropriate.  ; Call 911 if you have a medical emergency: If you have a medical emergency and need to call 911, notify the dispatch personnel that you have, or are being evaluated for COVID-19. If possible, put on a facemask before emergency medical services arrive. Patient instructed to return to the office if symptoms worsen, return, or have any other concerns. Patient understands and is agreeable.          Katerine Haro PA-C 12/28/2020 2:49 PM

## 2020-12-28 NOTE — PATIENT INSTRUCTIONS
· Put a hot, wet towel or a warm gel pack on your face 3 or 4 times a day for 5 to 10 minutes each time. · Try a decongestant nasal spray like oxymetazoline (Afrin). Do not use it for more than 3 days in a row. Using it for more than 3 days can make your congestion worse. When should you call for help? Call your doctor now or seek immediate medical care if:    · You have new or worse swelling or redness in your face or around your eyes.     · You have a new or higher fever. Watch closely for changes in your health, and be sure to contact your doctor if:    · You have new or worse facial pain.     · The mucus from your nose becomes thicker (like pus) or has new blood in it.     · You are not getting better as expected. Where can you learn more? Go to https://Glenveigh MedicalpeGlucoTeceb.OxyBand Technologies. org and sign in to your Global [employer] Media account. Enter V457 in the BALALIKEA box to learn more about \"Sinusitis: Care Instructions. \"     If you do not have an account, please click on the \"Sign Up Now\" link. Current as of: April 15, 2020               Content Version: 12.6  © 0608-3502 [employer] Care instructions adapted under license by [facility] ([facility]). If you have questions about a medical condition or this instruction, always ask your healthcare professional. [place] [address] any warranty or liability for your use of this information. Patient Education        Saline Nasal Washes: Care Instructions  Your Care Instructions     Saline nasal washes help keep the nasal passages open by washing out thick or dried mucus. This simple remedy can help relieve symptoms of allergies, sinusitis, and colds. It also can make the nose feel more comfortable by keeping the mucous membranes moist. You may notice a little burning sensation in your nose the first few times you use the solution, but this usually gets better in a few days. Follow-up care is a key part of your treatment and safety. Be sure to make and go to all appointments, and call your doctor if you are having problems. It's also a good idea to know your test results and keep a list of the medicines you take. How can you care for yourself at home? · You can buy premixed saline solution in a squeeze bottle or other sinus rinse products at a drugstore. Read and follow the instructions on the label. · You also can make your own saline solution by adding 1 teaspoon of salt and 1 teaspoon of baking soda to 2 cups of distilled water. · If you use a homemade solution, pour a small amount into a clean bowl. Using a rubber bulb syringe, squeeze the syringe and place the tip in the salt water. Pull a small amount of the salt water into the syringe by relaxing your hand. · Sit down with your head tilted slightly back. Do not lie down. Put the tip of the bulb syringe or the squeeze bottle a little way into one of your nostrils. Gently drip or squirt a few drops into the nostril. Repeat with the other nostril. Some sneezing and gagging are normal at first.  · Gently blow your nose. · Wipe the syringe or bottle tip clean after each use. · Repeat this 2 or 3 times a day. · Use nasal washes gently if you have nosebleeds often. When should you call for help? Watch closely for changes in your health, and be sure to contact your doctor if:    · You often get nosebleeds.     · You have problems doing the nasal washes. Where can you learn more? Go to https://WritePathruth [email or web address] org and sign in to your In[employer]ech Solar account. Enter [phone] [id number] [phone] in the Miner box to learn more about \"Saline Nasal Washes: Care Instructions. \"     If you do not have an account, please click on the \"Sign Up Now\" link. Current as of: April 15, 2020               Content Version: 12.6  © 0667-7589 FX Aligned, FundedByMe. Go to [email or web address] [email or web address] and sign in to your Minefult account. Enter V887 in the [facility] box to learn more about \"Middle Ear Fluid: Care Instructions. \"     If you do not have an account, please click on the \"Sign Up Now\" link. Current as of: April 15, 2020               Content Version: 12.6  © 4426-6563 LetMeHearYa. Care instructions adapted under license by [facility] ([facility]). If you have questions about a medical condition or this instruction, always ask your healthcare professional. [place] [address] any warranty or liability for your use of this information. Patient Education        Learning About Coronavirus [phone])  Coronavirus [phone]): Overview  What is coronavirus (JPGOE-80)? The coronavirus disease (COVID-19) is caused by a virus. It is an illness that was first found in December 2019. It has since spread worldwide. The virus can cause fever, cough, and trouble breathing. In severe cases, it can cause pneumonia and make it hard to breathe without help. It can cause death. This virus spreads person-to-person through droplets from coughing and sneezing. It can also spread when you are close to someone who is infected. And it can spread when you touch something that has the virus on it, such as a doorknob or a tabletop. Coronaviruses are a large group of viruses. They cause the common cold. They also cause more serious illnesses like Middle East respiratory syndrome (MERS) and severe acute respiratory syndrome (SARS). COVID-19 is caused by a novel coronavirus. That means it's a new type that has not been seen in people before. How is COVID-19 treated? Mild illness can be treated at home, but more serious illness needs to be treated in the hospital. Treatment may include medicines to reduce symptoms, plus breathing support such as oxygen therapy or a ventilator. Other treatments, such as antiviral medicines, may help people who have COVID-19. What can you do to protect yourself from COVID-19? The best way to protect yourself from getting sick is to:  · Avoid areas where there is an outbreak. · Avoid contact with people who may be infected. · Avoid crowds and try to stay at least 6 feet away from other people. · Wash your hands often, especially after you cough or sneeze. Use soap and water, and scrub for at least 20 seconds. If soap and water aren't available, use an alcohol-based hand . · Avoid touching your mouth, nose, and eyes. What can you do to avoid spreading the virus to others? To help avoid spreading the virus to others:  · Wash your hands often with soap or alcohol-based hand sanitizers. · Cover your mouth with a tissue when you cough or sneeze. Then throw the tissue in the trash. · Use a disinfectant to clean things that you touch often. These include doorknobs, remote controls, phones, and handles on your refrigerator and microwave. And don't forget countertops, tabletops, bathrooms, and computer keyboards. · Wear a cloth face cover if you have to go to public areas. If you know or suspect that you have COVID-19:  · Stay home. Don't go to school, work, or public areas. And don't use public transportation, ride-shares, or taxis unless you have no choice. · Leave your home only if you need to get medical care or testing. But call the doctor's office first so they know you're coming. And wear a face cover. · Limit contact with people in your home. If possible, stay in a separate bedroom and use a separate bathroom. · Take acetaminophen (such as Tylenol) to reduce a fever. It may also help with muscle aches. Read and follow all instructions on the label. · Use petroleum jelly on sore skin. This can help if the skin around your nose and lips becomes sore from rubbing a lot with tissues. Tips for self-isolation  · Limit contact with people in your home. If possible, stay in a separate bedroom and use a separate bathroom. · Wear a cloth face cover when you are around other people. It can help stop the spread of the virus when you cough or sneeze. · If you have to leave home, avoid crowds and try to stay at least 6 feet away from other people. · Avoid contact with pets and other animals. · Cover your mouth and nose with a tissue when you cough or sneeze. Then throw it in the trash right away. · Wash your hands often, especially after you cough or sneeze. Use soap and water, and scrub for at least 20 seconds. If soap and water aren't available, use an alcohol-based hand . · Don't share personal household items. These include bedding, towels, cups and glasses, and eating utensils. · [address] [facility] in the warmest water allowed for the fabric type, and dry it completely. It's okay to wash other people's laundry with yours. · Clean and disinfect your home every day. Use household  and disinfectant wipes or sprays. Take special care to clean things that you grab with your hands. These include doorknobs, remote controls, phones, and handles on your refrigerator and microwave. And don't forget countertops, tabletops, bathrooms, and computer keyboards. When you can end self-isolation  · If you know or suspect that you have COVID-19, stay in self-isolation until:  ? You haven't had a fever for 3 days, and  ? Your symptoms have improved, and  ? It's been at least 10 days since your symptoms started. · Talk to your doctor about whether you also need testing, especially if you have a weakened immune system. When should you call for help? Call 911 anytime you think you may need emergency care. For example, call if you have life-threatening symptoms, such as:    · You have severe trouble breathing. (You can't talk at all.)     · You have constant chest pain or pressure.     · You are severely dizzy or lightheaded.     · You are confused or can't think clearly.     · Your face and lips have a blue color.     · You pass out (lose consciousness) or are very hard to wake up. Call your doctor now or seek immediate medical care if:    · You have moderate trouble breathing. (You can't speak a full sentence.)     · You are coughing up blood (more than about 1 teaspoon).     · You have signs of low blood pressure. These include feeling lightheaded; being too weak to stand; and having cold, pale, clammy skin. Watch closely for changes in your health, and be sure to contact your doctor if:    · Your symptoms get worse.     · You are not getting better as expected. Call before you go to the doctor's office. Follow their instructions. And wear a cloth face cover. Current as of: July 10, 2020               Content Version: 12.6  © 2006-2020 [employer]ov[employer], [employer]. Care instructions adapted under license by [facility] ([facility]). If you have questions about a medical condition or this instruction, always ask your healthcare professional. [address] any warranty or liability for your use of this information.

## 2020-12-30 LAB — SARS-COV-2, NAA: NOT DETECTED

## 2021-01-20 ENCOUNTER — OFFICE VISIT (OUTPATIENT)
Dept: PRIMARY CARE CLINIC | Age: 41
End: 2021-01-20
Payer: MEDICARE

## 2021-01-20 ENCOUNTER — HOSPITAL ENCOUNTER (OUTPATIENT)
Age: 41
Setting detail: SPECIMEN
Discharge: HOME | End: 2021-01-20
Payer: MEDICARE

## 2021-01-20 VITALS — HEIGHT: 73 IN | BODY MASS INDEX: 33.13 KG/M2 | WEIGHT: 250 LBS

## 2021-01-20 DIAGNOSIS — J32.0 CHRONIC MAXILLARY SINUSITIS: Primary | ICD-10-CM

## 2021-01-20 DIAGNOSIS — J32.0 CHRONIC MAXILLARY SINUSITIS: ICD-10-CM

## 2021-01-20 DIAGNOSIS — H65.191 ACUTE MEE (MIDDLE EAR EFFUSION), RIGHT: ICD-10-CM

## 2021-01-20 LAB — SARS-COV-2 ANTIBODY, TOTAL: NEGATIVE

## 2021-01-20 PROCEDURE — 99214 OFFICE O/P EST MOD 30 MIN: CPT | Performed by: NURSE PRACTITIONER

## 2021-01-20 PROCEDURE — G8484 FLU IMMUNIZE NO ADMIN: HCPCS | Performed by: NURSE PRACTITIONER

## 2021-01-20 PROCEDURE — G8427 DOCREV CUR MEDS BY ELIG CLIN: HCPCS | Performed by: NURSE PRACTITIONER

## 2021-01-20 PROCEDURE — G8417 CALC BMI ABV UP PARAM F/U: HCPCS | Performed by: NURSE PRACTITIONER

## 2021-01-20 PROCEDURE — 1036F TOBACCO NON-USER: CPT | Performed by: NURSE PRACTITIONER

## 2021-01-20 RX ORDER — DOXYCYCLINE HYCLATE 100 MG
100 TABLET ORAL 2 TIMES DAILY
Qty: 14 TABLET | Refills: 0 | Status: SHIPPED | OUTPATIENT
Start: 2021-01-20 | End: 2021-01-27

## 2021-01-20 ASSESSMENT — ENCOUNTER SYMPTOMS
RHINORRHEA: 1
DIARRHEA: 0
SINUS PAIN: 1
VOMITING: 0
BACK PAIN: 0
COUGH: 0
ABDOMINAL PAIN: 0
SHORTNESS OF BREATH: 0
NAUSEA: 0
EYE PAIN: 0
SORE THROAT: 0
SINUS PRESSURE: 1

## 2021-01-20 NOTE — PATIENT INSTRUCTIONS
Patient Education        Chronic Sinusitis: Care Instructions  Your Care Instructions     Sinusitis is an infection of the lining of the sinus cavities in your head. It causes pain and pressure in your head and face. Sinusitis can be short-term (acute) or long-term (chronic). Chronic sinusitis lasts 12 weeks or longer. It is often caused by a bacterial or fungal infection. Other things, such as allergies, may also be involved. Chronic sinusitis may be hard to treat. It can lead to permanent changes in the mucous membranes that line the sinuses. It may make future sinus infections more likely. The infection may take some time to treat. Antibiotics are usually used if the infection is caused by bacteria. You may also need to use a corticosteroid nasal spray. If the infection is not cured after you try two or more different antibiotics, you may want to talk with your doctor about surgery or allergy testing. If the sinusitis is caused by a fungal infection, you may need to take antifungals or other medicines. You may also need surgery. Follow-up care is a key part of your treatment and safety. Be sure to make and go to all appointments, and call your doctor if you are having problems. It's also a good idea to know your test results and keep a list of the medicines you take. How can you care for yourself at home? Medicines    · Be safe with medicines. Take your medicines exactly as prescribed. Call your doctor if you think you are having a problem with your medicine. You will get more details on the specific medicines your doctor prescribes.     · Take your antibiotics as directed. Do not stop taking them just because you feel better. You need to take the full course of antibiotics.     · Your doctor may recommend a corticosteroid nasal spray, wash, drops, or pills. Take this medicine exactly as prescribed.    At home   · Breathe warm, moist air. You can use a steamy shower, a hot bath, or a sink filled with hot water. Avoid cold, dry air. Using a humidifier in your home may help. Follow the instructions for cleaning the machine.     · Use saline (saltwater) nasal washes every day. This helps keep your nasal passages open. It also can wash out mucus and bacteria. ? You can buy saline nose drops at a grocery store or drugstore. ? You can make your own at home. Add 1 teaspoon of salt and 1 teaspoon of baking soda to 2 cups of distilled water. If you make your own, fill a bulb syringe with the solution. Then insert the tip into your nostril and squeeze gently. Sebas Balling your nose.     · Put a warm, wet towel or a warm gel pack on your face 3 or 4 times a day. Leave it on 5 to 10 minutes each time.     · Do not smoke or breathe secondhand smoke. Smoking can make sinusitis worse. If you need help quitting, talk to your doctor about stop-smoking programs and medicines. These can increase your chances of quitting for good. When should you call for help? Call your doctor now or seek immediate medical care if:    · You have new or worse symptoms of infection, such as:  ? Increased pain, swelling, warmth, or redness. ? Red streaks leading from the area. ? Pus draining from the area. ? A fever. Watch closely for changes in your health, and be sure to contact your doctor if:    · The mucus from your nose becomes thicker (like pus) or has new blood in it.     · You do not get better as expected. Where can you learn more? Go to https://CGTraderanilHii Def Inc..Cvergenx. org and sign in to your Lastline account. Enter G039 in the Year Up box to learn more about \"Chronic Sinusitis: Care Instructions. \"     If you do not have an account, please click on the \"Sign Up Now\" link. Current as of: April 15, 2020               Content Version: 12.6  © 6460-2881 InstraGrok, CMGE. Care instructions adapted under license by [facility]. If you have questions about a medical condition or this instruction, always ask your healthcare professional. [place] [address] any warranty or liability for your use of this information.

## 2021-01-20 NOTE — PROGRESS NOTES
[facility] [address] [facility] WALK IN [facility]  [address] [place] [address] [address]  Dept: [phone]  Dept Fax: [phone]    Hoover Lombard is a 39 y.o. male who presents to the urgent care today for his medicalconditions/complaints as noted below. Denver A Colpaert is c/o of Otalgia and Sinusitis      HPI:       49-year-old male patient presents with complaints of headache, sinus pressure. Patient reports several weeks of symptoms. Patient was seen on 12/28 treated for right middle ear effusion, sinusitis with Augmentin, prednisone. Patient ports he takes medicine denies significant improvement. Patient did test negative for COVID-19. Currently patient reports right-sided ear fullness, maxillary sinus pressure frontal sinus pressure, nasal congestion, rhinorrhea. Denies cough. Denies fevers or chills. Does have significant history of septum and sinus surgery. Does not regularly see ENT in several years.       Past Medical History:   Diagnosis Date    Chronic back pain     DDD (degenerative disc disease)     Depression 7/17/2017    Depression with anxiety     DJD (degenerative joint disease)     knees, ankles, spine    GERD (gastroesophageal reflux disease)     GERD with stricture 3/18/2015    Hyperlipidemia, mixed     Internal derangement of shoulder 5/21/2013    Left nephrolithiasis 5/5/2017    Left shoulder strain 5/21/2013    Obesity (BMI 30-39.9) 6/27/2019    Radiculopathy     cervical, and lumbar        Current Outpatient Medications   Medication Sig Dispense Refill    doxycycline hyclate (VIBRA-TABS) 100 MG tablet Take 1 tablet by mouth 2 times daily for 7 days 14 tablet 0    SUMAtriptan (IMITREX) 100 MG tablet       gabapentin (NEURONTIN) 300 MG capsule TAKE 1 CAPSULE THREE TIMES A DAY (SEDATION WARNING) 270 capsule 5    PANCREATIN PO Take by mouth      escitalopram (LEXAPRO) 10 MG tablet TAKE 1 TABLET DAILY 90 tablet 5  celecoxib (CELEBREX) 200 MG capsule TAKE 1 CAPSULE DAILY 90 capsule 5    rosuvastatin (CRESTOR) 10 MG tablet TAKE 1 TABLET DAILY 90 tablet 5    omeprazole (PRILOSEC) 20 MG delayed release capsule TAKE 1 CAPSULE DAILY 90 capsule 5    montelukast (SINGULAIR) 10 MG tablet Take 1 tablet by mouth daily 90 tablet 5    metoprolol tartrate (LOPRESSOR) 25 MG tablet Take 1 tablet by mouth 2 times daily (Patient taking differently: Take 25 mg by mouth 2 times daily Taking 50mg daily) 180 tablet 5    buPROPion (WELLBUTRIN SR) 100 MG extended release tablet Take 100 mg by mouth 2 times daily      fluticasone (FLONASE) 50 MCG/ACT nasal spray 1 spray by Nasal route daily 2 sprays each nostril daily 3 Bottle 3    clobetasol (TEMOVATE) 0.05 % ointment Apply to rash twice daily (not face, armpit or groin) 30 g 2    valACYclovir (VALTREX) 1 g tablet take 2 tablets by mouth AT FIRST SIGN OF COLD SORE AND REPEAT IN 12 HOURS 12 tablet 3    aspirin-acetaminophen-caffeine (EXCEDRIN MIGRAINE) 250-250-65 MG per tablet Take 2 tablets by mouth daily as needed.  aspirin 81 MG EC tablet Take 81 mg by mouth daily. No current facility-administered medications for this visit. Allergies   Allergen Reactions    Shrimp Flavor Swelling     Hands swell up,  Positive allergy testing.  Zocor [Simvastatin] Other (See Comments)     Muscle pain / spasm       Subjective:      Review of Systems   Constitutional: Negative for chills, fatigue and fever. HENT: Positive for congestion, ear pain, rhinorrhea, sinus pressure and sinus pain. Negative for sore throat. Eyes: Negative for pain and visual disturbance. Respiratory: Negative for cough and shortness of breath. Cardiovascular: Negative for chest pain and palpitations. Gastrointestinal: Negative for abdominal pain, diarrhea, nausea and vomiting. Genitourinary: Negative for penile pain and testicular pain. Musculoskeletal: Negative for back pain, joint swelling and neck pain. Skin: Negative for rash. Neurological: Negative for dizziness and light-headedness. Hematological: Does not bruise/bleed easily. All other systems reviewed and are negative. Objective:     Physical Exam  Vitals signs and nursing note reviewed. Constitutional:       General: He is not in acute distress. Appearance: Normal appearance. He is not toxic-appearing. HENT:      Right Ear: A middle ear effusion is present. Left Ear: A middle ear effusion is present. Nose: Congestion and rhinorrhea present. Right Sinus: Maxillary sinus tenderness present. Left Sinus: Maxillary sinus tenderness present. Mouth/Throat:      Mouth: Mucous membranes are moist.      Pharynx: No posterior oropharyngeal erythema. Cardiovascular:      Rate and Rhythm: Normal rate. Pulmonary:      Effort: Pulmonary effort is normal. No respiratory distress. Breath sounds: Normal breath sounds. Neurological:      Mental Status: He is alert. Ht 6' 1\" (1.854 m)   Wt 250 lb (113.4 kg)   BMI 32.98 kg/m²   Lab Review   Hospital Outpatient Visit on 12/28/2020   Component Date Value    SARS-CoV-2, CHANG 12/28/2020 Not Detected        Assessment:       Diagnosis Orders   1. Chronic maxillary sinusitis  doxycycline hyclate (VIBRA-TABS) 100 MG tablet    Covid-19, Antibody, Total    [facility] - Jesus Crump MD, Otolaryngology, [facility]   2. Acute JANE (middle ear effusion), right  doxycycline hyclate (VIBRA-TABS) 100 MG tablet    Covid-19, Antibody, Total    AFL - Jesus Crump MD, Otolaryngology, [facility]:      Return if symptoms worsen or fail to improve.     Orders Placed This Encounter   Medications    doxycycline hyclate (VIBRA-TABS) 100 MG tablet     Sig: Take 1 tablet by mouth 2 times daily for 7 days     Dispense:  14 tablet     Refill:  0 With persisting sinus symptoms will alternate from Augmentin to doxycycline. Recommend outpatient follow-up with ENT. Order for COVID-19 antibody, discussed can wait until daughter's test results are in prior to obtaining testing  Based on the duration and severity of the symptoms-- I will treat this as bacterial at this time due to patient's significant sinus surgery/history  Patient instructed to complete antibiotic prescription fully. May use Motrin/Tylenol for fever/pain. Saline washes, salt water gargles and over the counter preparations if desired. Patient agreeable to treatment plan. Educational materials provided on AVS.  Follow up if symptoms do not improve/worsen. Patient given educational materials - see patient instructions. Discussed use, benefit, and side effects of prescribed medications. All patientquestions answered. Pt voiced understanding. This note was transcribed using dictation with Dragon services. Efforts were made to correct any errors but some words may be misinterpreted.      Electronically signed by YAHAIRA Craig CNP on 1/20/2021at 2:59 PM

## 2021-02-01 DIAGNOSIS — G56.22 ULNAR NEUROPATHY AT ELBOW OF LEFT UPPER EXTREMITY: ICD-10-CM

## 2021-02-01 DIAGNOSIS — M48.062 SPINAL STENOSIS OF LUMBAR REGION WITH NEUROGENIC CLAUDICATION: ICD-10-CM

## 2021-02-01 DIAGNOSIS — J30.2 SEASONAL ALLERGIC RHINITIS, UNSPECIFIED TRIGGER: ICD-10-CM

## 2021-02-01 DIAGNOSIS — E78.5 HYPERLIPIDEMIA, UNSPECIFIED HYPERLIPIDEMIA TYPE: ICD-10-CM

## 2021-02-01 DIAGNOSIS — F41.8 DEPRESSION WITH ANXIETY: ICD-10-CM

## 2021-02-01 DIAGNOSIS — K21.9 GASTROESOPHAGEAL REFLUX DISEASE: ICD-10-CM

## 2021-02-01 RX ORDER — MONTELUKAST SODIUM 10 MG/1
10 TABLET ORAL DAILY
Qty: 90 TABLET | Refills: 5 | Status: SHIPPED | OUTPATIENT
Start: 2021-02-01 | End: 2022-04-12 | Stop reason: SDUPTHER

## 2021-02-01 RX ORDER — ESCITALOPRAM OXALATE 10 MG/1
TABLET ORAL
Qty: 90 TABLET | Refills: 5 | Status: SHIPPED | OUTPATIENT
Start: 2021-02-01 | End: 2022-04-12 | Stop reason: SDUPTHER

## 2021-02-01 RX ORDER — CELECOXIB 200 MG/1
CAPSULE ORAL
Qty: 90 CAPSULE | Refills: 5 | Status: SHIPPED | OUTPATIENT
Start: 2021-02-01 | End: 2022-04-12 | Stop reason: SDUPTHER

## 2021-02-01 RX ORDER — ROSUVASTATIN CALCIUM 10 MG/1
TABLET, COATED ORAL
Qty: 90 TABLET | Refills: 5 | Status: SHIPPED | OUTPATIENT
Start: 2021-02-01 | End: 2022-03-11

## 2021-02-01 RX ORDER — OMEPRAZOLE 20 MG/1
CAPSULE, DELAYED RELEASE ORAL
Qty: 90 CAPSULE | Refills: 5 | Status: SHIPPED | OUTPATIENT
Start: 2021-02-01 | End: 2022-04-12 | Stop reason: SDUPTHER

## 2021-02-01 RX ORDER — GABAPENTIN 300 MG/1
CAPSULE ORAL
Qty: 270 CAPSULE | Refills: 5 | Status: SHIPPED | OUTPATIENT
Start: 2021-02-01 | End: 2022-04-12 | Stop reason: SDUPTHER

## 2021-03-11 ASSESSMENT — PATIENT HEALTH QUESTIONNAIRE - PHQ9
5. POOR APPETITE OR OVEREATING: 3
SUM OF ALL RESPONSES TO PHQ QUESTIONS 1-9: 12
SUM OF ALL RESPONSES TO PHQ QUESTIONS 1-9: 12
8. MOVING OR SPEAKING SO SLOWLY THAT OTHER PEOPLE COULD HAVE NOTICED. OR THE OPPOSITE, BEING SO FIGETY OR RESTLESS THAT YOU HAVE BEEN MOVING AROUND A LOT MORE THAN USUAL: 0
SUM OF ALL RESPONSES TO PHQ QUESTIONS 1-9: 12
1. LITTLE INTEREST OR PLEASURE IN DOING THINGS: 2
9. THOUGHTS THAT YOU WOULD BE BETTER OFF DEAD, OR OF HURTING YOURSELF: 0
2. FEELING DOWN, DEPRESSED OR HOPELESS: 2
SUM OF ALL RESPONSES TO PHQ9 QUESTIONS 1 & 2: 4

## 2021-03-11 ASSESSMENT — LIFESTYLE VARIABLES
AUDIT-C TOTAL SCORE: INCOMPLETE
HOW OFTEN DO YOU HAVE A DRINK CONTAINING ALCOHOL: NEVER
AUDIT TOTAL SCORE: INCOMPLETE

## 2021-03-12 ENCOUNTER — VIRTUAL VISIT (OUTPATIENT)
Dept: FAMILY MEDICINE CLINIC | Age: 41
End: 2021-03-12
Payer: MEDICARE

## 2021-03-12 DIAGNOSIS — F41.8 DEPRESSION WITH ANXIETY: ICD-10-CM

## 2021-03-12 DIAGNOSIS — K86.89 PANCREATIC INSUFFICIENCY: ICD-10-CM

## 2021-03-12 DIAGNOSIS — Z13.31 POSITIVE DEPRESSION SCREENING: ICD-10-CM

## 2021-03-12 DIAGNOSIS — Z00.00 MEDICARE ANNUAL WELLNESS VISIT, INITIAL: Primary | ICD-10-CM

## 2021-03-12 PROCEDURE — G8431 POS CLIN DEPRES SCRN F/U DOC: HCPCS | Performed by: INTERNAL MEDICINE

## 2021-03-12 PROCEDURE — G0438 PPPS, INITIAL VISIT: HCPCS | Performed by: INTERNAL MEDICINE

## 2021-03-12 PROCEDURE — G8484 FLU IMMUNIZE NO ADMIN: HCPCS | Performed by: INTERNAL MEDICINE

## 2021-03-12 RX ORDER — CHLORAL HYDRATE 500 MG
CAPSULE ORAL
COMMUNITY
Start: 2020-02-01

## 2021-03-18 NOTE — PROGRESS NOTES
Medicare Annual Wellness Visit - Initial    Name: Keith Emanuel Date: 3/17/2021   MRN: [id number] Sex: Male   Age: 39 y.o. Ethnicity: Non-/Non    : 1980 Race: White      [place] A Colpaert is here for   Chief Complaint   Patient presents with    Medicare AWV        Screenings for behavioral, psychosocial and functional/safety risks, and cognitive dysfunction are all negative except as indicated below. These results, as well as other patient data from the [address] form, are documented in Flowsheets linked to this Encounter. Allergies   Allergen Reactions    Shrimp Flavor Swelling     Hands swell up,  Positive allergy testing.  Zocor [Simvastatin] Other (See Comments)     Muscle pain / spasm       Prior to Visit Medications    Medication Sig Taking?  Authorizing Provider   Omega-3 Fatty Acids (FISH OIL) 1000 MG CAPS  Yes Historical Provider, MD   celecoxib (CELEBREX) 200 MG capsule TAKE 1 CAPSULE DAILY Yes Hellen Nunes DO   omeprazole (PRILOSEC) 20 MG delayed release capsule TAKE 1 CAPSULE DAILY Yes Hellen Nunes DO   rosuvastatin (CRESTOR) 10 MG tablet TAKE 1 TABLET DAILY Yes Hellen Nunes DO   escitalopram (LEXAPRO) 10 MG tablet TAKE 1 TABLET DAILY Yes Hellen Nunes DO   gabapentin (NEURONTIN) 300 MG capsule TAKE 1 CAPSULE THREE TIMES A DAY (SEDATION WARNING) Yes Hellen Nunes DO   metoprolol tartrate (LOPRESSOR) 25 MG tablet Take 2 tablets by mouth daily Taking 50mg daily Yes Aurora Means DO   montelukast (SINGULAIR) 10 MG tablet Take 1 tablet by mouth daily Yes Hellen Nunes DO   SUMAtriptan (IMITREX) 100 MG tablet  Yes Historical Provider, MD   PANCREATIN PO Take by mouth Yes Historical Provider, MD   fluticasone (FLONASE) 50 MCG/ACT nasal spray 1 spray by Nasal route daily 2 sprays each nostril daily Yes Margret Lou MD   clobetasol (TEMOVATE) 0.05 % ointment Apply to rash twice daily (not face, armpit or groin) Yes Lexa Hermosillo MD   valACYclovir (VALTREX) 1 g tablet take 2 tablets by mouth AT [place] Surinder Chavez MD   aspirin-acetaminophen-caffeine (EXCEDRIN MIGRAINE) 400-567-01 MG per tablet Take 2 tablets by mouth daily as needed. Yes Historical Provider, MD   aspirin 81 MG EC tablet Take 81 mg by mouth daily. Yes Historical Provider, MD   buPROPion (WELLBUTRIN SR) 100 MG extended release tablet Take 100 mg by mouth 2 times daily  Historical Provider, MD       Past Medical History:   Diagnosis Date    Chronic back pain     DDD (degenerative disc disease)     Depression 7/17/2017    Depression with anxiety     DJD (degenerative joint disease)     knees, ankles, spine    GERD (gastroesophageal reflux disease)     GERD with stricture 3/18/2015    Hyperlipidemia, mixed     Internal derangement of shoulder 5/21/2013    Left nephrolithiasis 5/5/2017    Left shoulder strain 5/21/2013    Obesity (BMI 30-39.9) 6/27/2019    Radiculopathy     cervical, and lumbar       Past Surgical History:   Procedure Laterality Date    BACK SURGERY      L4-L5, L5-S1    CARPAL TUNNEL RELEASE Left 06/24/14    Dr. Cheyanne Jon Left 6/24/14    ulnar nerve relocation, Dr. Loi Stephenson.  EMG Left 4/3/14    Upper Dr. Gloria Stewart ARTHROSCOPY Left 2/1/13    Dr Loi Stephenson, torn meniscus    LUMBAR FUSION  07/17/2017    L4-L5-S1 with rods, screws, and cages. Dr Nilesh Barrett, [facility] NECK SURGERY      4-5 fusion    NERVE SURGERY Left 11/24/15    Dr Wing Lew, left ulnar nerve transposition, additional correction.  SHOULDER SURGERY      x2 right shoulder, x2 left    WISDOM TOOTH EXTRACTION      x4       Family History   Problem Relation Age of Onset    High Blood Pressure Mother     Thyroid Disease Mother     Heart Disease Father         MI X4, since age 48.      Thyroid Disease Father     Bipolar Disorder Brother     Arthritis Brother        CareTeam (Including outside providers/suppliers regularly involved in providing care):   Patient Care Team:  Willis Riggins DO as PCP - General (Family Medicine)  Willis Riggins DO as PCP - REHABILITATION [facility] Empaneled Provider  Contreras Elise MD as Consulting Physician (Rheumatology)  Mellissa Alonzo as Consulting Physician (Orthopedic Surgery)  Anton Cameron MD as Consulting Physician (Gastroenterology)  John Barry DPM (Inactive) (Podiatry)  Pearl Us MD as Consulting Physician (Cardiology)  Virlinda Burkitt, MD as Consulting Physician (Urology)    Wt Readings from Last 3 Encounters:   01/20/21 250 lb (113.4 kg)   11/09/20 257 lb (116.6 kg)   08/10/20 258 lb (117 kg)       The following problems were reviewed today and where indicated follow up appointments were made and/or referrals ordered. Risk Factor Screenings with Interventions     Fall Risk:  2 or more falls in past year?: (!) yes  Fall with injury in past year?: no  Fall Risk Interventions:    · Home safety tips provided  · Home exercises provided to promote strength and balance    Depression:  PHQ-2 Score: 4  Depression Interventions:  · patient denies needing adjustment to medication ,     Anxiety:     Anxiety Interventions:  · NA    Cognitive:     Cognitive Impairment Interventions:  · na    Substance Abuse:  Social History     Socioeconomic History    Marital status:      Spouse name: Not on file    Number of children: Not on file    Years of education: Not on file    Highest education level: Not on file   Occupational History    Not on file   Social Needs    Financial resource strain: Not on file    Food insecurity     Worry: Not on file     Inability: Not on file    Transportation needs     Medical: Not on file     Non-medical: Not on file   Tobacco Use    Smoking status: Never Smoker    Smokeless tobacco: Never Used    Tobacco comment: trying to, is down to the 1/2 pack daily from 1. to start the Chantix soon, note also trying vaping. Substance and Sexual Activity    Alcohol use: No     Comment: rare, holiday only    Drug use: Yes     Types: Marijuana     Comment: gummies    Sexual activity: Yes     Partners: Female     Comment: spouse   Lifestyle    Physical activity     Days per week: Not on file     Minutes per session: Not on file    Stress: Not on file   Relationships    Social connections     Talks on phone: Not on file     Gets together: Not on file     Attends Tenriism service: Not on file     Active member of club or organization: Not on file     Attends meetings of clubs or organizations: Not on file     Relationship status: Not on file    Intimate partner violence     Fear of current or ex partner: Not on file     Emotionally abused: Not on file     Physically abused: Not on file     Forced sexual activity: Not on file   Other Topics Concern    Not on file   Social History Narrative    Not on file     Audit Questionnaire: Screen for Alcohol Misuse  How often do you have a drink containing alcohol?: Never  Substance Abuse Interventions:  · na    Health Risk Assessment:     General  In general, how would you say your health is?: Fair  In the past 7 days, have you experienced any of the following? New or Increased Pain, New or Increased Fatigue, Loneliness, Social Isolation, Stress or Anger?: (!) Social Isolation, Stress  Do you get the social and emotional support that you need?: Yes  Do you have a Living Will?: (!) No  General Health Risk Interventions:  · Loneliness: related to the covid vaccine   · Stress: see above as well ; reviewed non med options for stress and isolation , medication adjusted- no meds     Health Habits/Nutrition  Do you exercise for at least 20 minutes 2-3 times per week?: Yes  Have you lost any weight without trying in the past 3 months?: No  Do you eat only one meal per day?: (!) Yes  Have you seen the dentist within the past year?: (!) No  There is no height or weight on file to calculate BMI.   Health Habits/Nutrition Interventions:  · Dental exam overdue:  patient encouraged to make appointment with his/her dentist    Hearing/Vision  Do you or your family notice any trouble with your hearing that hasn't been managed with hearing aids?: (!) Yes  Do you have difficulty driving, watching TV, or doing any of your daily activities because of your eyesight?: No  Have you had an eye exam within the past year?: Yes  Hearing/Vision Interventions:  · Hearing concerns:  patient declines any further evaluation/treatment for hearing issues    Safety  Do you have working smoke detectors?: Yes  Have all throw rugs been removed or fastened?: (!) No  Do you have non-slip mats or surfaces in all bathtubs/showers?: (!) No  Do all of your stairways have a railing or banister?: Yes  Are your doorways, halls and stairs free of clutter?: Yes  Do you always fasten your seatbelt when you are in a car?: Yes  Safety Interventions:  · Home safety tips provided    ADLs  In the past 7 days, did you need help from others to perform any of the following everyday activities? Eating, dressing, grooming, bathing, toileting, or walking/balance?: None  In the past 7 days, did you need help from others to take care of any of the following?  Laundry, housekeeping, banking/finances, shopping, telephone use, food preparation, transportation, or taking medications?: None  ADL Interventions:  · NA    Personalized Preventive Plan   Current Health Maintenance Status  Immunization History   Administered Date(s) Administered    Influenza 10/03/2013    Influenza Vaccine, unspecified formulation 11/20/2018    Influenza Virus Vaccine 10/30/2012, 09/19/2014, 10/02/2015    Influenza, Quadv, IM, (6 mo and older Fluzone, Flulaval, Fluarix and 3 yrs and older Afluria) 09/28/2016    Influenza, Quadv, IM, PF (6 mo and older Fluzone, Flulaval, Fluarix, and 3 yrs and older Afluria) 10/26/2017, 11/05/2019    PPD Test 11/29/2012    Pneumococcal Polysaccharide

## 2021-04-05 ENCOUNTER — HOSPITAL ENCOUNTER (OUTPATIENT)
Age: 41
Setting detail: SPECIMEN
Discharge: HOME | End: 2021-04-05
Payer: MEDICARE

## 2021-04-05 DIAGNOSIS — K86.89 PANCREATIC INSUFFICIENCY: ICD-10-CM

## 2021-04-05 DIAGNOSIS — F41.8 DEPRESSION WITH ANXIETY: ICD-10-CM

## 2021-04-05 LAB
ABSOLUTE EOS #: 0.14 K/UL (ref 0–0.44)
ABSOLUTE IMMATURE GRANULOCYTE: 0.08 K/UL (ref 0–0.3)
ABSOLUTE LYMPH #: 2.73 K/UL (ref 1.1–3.7)
ABSOLUTE MONO #: 0.53 K/UL (ref 0.1–1.2)
ALBUMIN SERPL-MCNC: 4.8 G/DL (ref 3.5–5.2)
ALBUMIN/GLOBULIN RATIO: 1.8 (ref 1–2.5)
ALP BLD-CCNC: 69 U/L (ref 40–129)
ALT SERPL-CCNC: 35 U/L (ref 5–41)
ANION GAP SERPL CALCULATED.3IONS-SCNC: 13 MMOL/L (ref 9–17)
AST SERPL-CCNC: 24 U/L
BASOPHILS # BLD: 1 % (ref 0–2)
BASOPHILS ABSOLUTE: 0.09 K/UL (ref 0–0.2)
BILIRUB SERPL-MCNC: 0.66 MG/DL (ref 0.3–1.2)
BUN BLDV-MCNC: 18 MG/DL (ref 6–20)
BUN/CREAT BLD: NORMAL (ref 9–20)
CALCIUM SERPL-MCNC: 9.6 MG/DL (ref 8.6–10.4)
CHLORIDE BLD-SCNC: 101 MMOL/L (ref 98–107)
CO2: 23 MMOL/L (ref 20–31)
CREAT SERPL-MCNC: 0.74 MG/DL (ref 0.7–1.2)
DIFFERENTIAL TYPE: ABNORMAL
EOSINOPHILS RELATIVE PERCENT: 2 % (ref 1–4)
GFR AFRICAN AMERICAN: >60 ML/MIN
GFR NON-AFRICAN AMERICAN: >60 ML/MIN
GFR SERPL CREATININE-BSD FRML MDRD: NORMAL ML/MIN/{1.73_M2}
GFR SERPL CREATININE-BSD FRML MDRD: NORMAL ML/MIN/{1.73_M2}
GLUCOSE BLD-MCNC: 94 MG/DL (ref 70–99)
HCT VFR BLD CALC: 48.3 % (ref 40.7–50.3)
HEMOGLOBIN: 16.3 G/DL (ref 13–17)
IMMATURE GRANULOCYTES: 1 %
LIPASE: 32 U/L (ref 13–60)
LYMPHOCYTES # BLD: 31 % (ref 24–43)
MCH RBC QN AUTO: 30 PG (ref 25.2–33.5)
MCHC RBC AUTO-ENTMCNC: 33.7 G/DL (ref 28.4–34.8)
MCV RBC AUTO: 88.8 FL (ref 82.6–102.9)
MONOCYTES # BLD: 6 % (ref 3–12)
NRBC AUTOMATED: 0 PER 100 WBC
PDW BLD-RTO: 12.8 % (ref 11.8–14.4)
PLATELET # BLD: 248 K/UL (ref 138–453)
PLATELET ESTIMATE: ABNORMAL
PMV BLD AUTO: 10.7 FL (ref 8.1–13.5)
POTASSIUM SERPL-SCNC: 4.1 MMOL/L (ref 3.7–5.3)
PROSTATE SPECIFIC ANTIGEN: 0.61 UG/L
RBC # BLD: 5.44 M/UL (ref 4.21–5.77)
RBC # BLD: ABNORMAL 10*6/UL
SEG NEUTROPHILS: 59 % (ref 36–65)
SEGMENTED NEUTROPHILS ABSOLUTE COUNT: 5.29 K/UL (ref 1.5–8.1)
SODIUM BLD-SCNC: 137 MMOL/L (ref 135–144)
TESTOSTERONE TOTAL: 89 NG/DL (ref 220–1000)
TOTAL PROTEIN: 7.5 G/DL (ref 6.4–8.3)
TSH SERPL DL<=0.05 MIU/L-ACNC: 1.41 MIU/L (ref 0.3–5)
VITAMIN B-12: 542 PG/ML (ref 232–1245)
VITAMIN D 25-HYDROXY: 35.8 NG/ML (ref 30–100)
WBC # BLD: 8.9 K/UL (ref 3.5–11.3)
WBC # BLD: ABNORMAL 10*3/UL

## 2021-04-08 ENCOUNTER — OFFICE VISIT (OUTPATIENT)
Dept: FAMILY MEDICINE CLINIC | Age: 41
End: 2021-04-08
Payer: MEDICARE

## 2021-04-08 VITALS
WEIGHT: 254 LBS | SYSTOLIC BLOOD PRESSURE: 122 MMHG | OXYGEN SATURATION: 98 % | BODY MASS INDEX: 33.66 KG/M2 | HEART RATE: 72 BPM | DIASTOLIC BLOOD PRESSURE: 78 MMHG | HEIGHT: 73 IN | RESPIRATION RATE: 18 BRPM

## 2021-04-08 DIAGNOSIS — L98.491 SKIN ULCER, LIMITED TO BREAKDOWN OF SKIN (HCC): ICD-10-CM

## 2021-04-08 DIAGNOSIS — E29.1 HYPOGONADISM MALE: ICD-10-CM

## 2021-04-08 DIAGNOSIS — R53.83 OTHER FATIGUE: ICD-10-CM

## 2021-04-08 DIAGNOSIS — M79.642 PAIN IN BOTH HANDS: ICD-10-CM

## 2021-04-08 DIAGNOSIS — M79.89 SWELLING OF BOTH HANDS: ICD-10-CM

## 2021-04-08 DIAGNOSIS — R06.9 UNSPECIFIED ABNORMALITIES OF BREATHING: ICD-10-CM

## 2021-04-08 DIAGNOSIS — R79.89 LOW TESTOSTERONE LEVEL IN MALE: Primary | ICD-10-CM

## 2021-04-08 DIAGNOSIS — M79.641 PAIN IN BOTH HANDS: ICD-10-CM

## 2021-04-08 PROCEDURE — G8417 CALC BMI ABV UP PARAM F/U: HCPCS | Performed by: INTERNAL MEDICINE

## 2021-04-08 PROCEDURE — 1036F TOBACCO NON-USER: CPT | Performed by: INTERNAL MEDICINE

## 2021-04-08 PROCEDURE — 99213 OFFICE O/P EST LOW 20 MIN: CPT | Performed by: INTERNAL MEDICINE

## 2021-04-08 PROCEDURE — G8427 DOCREV CUR MEDS BY ELIG CLIN: HCPCS | Performed by: INTERNAL MEDICINE

## 2021-04-08 RX ORDER — TESTOSTERONE CYPIONATE 200 MG/ML
1 VIAL (ML) INTRAMUSCULAR
Qty: 5 ML | Refills: 2 | Status: SHIPPED | OUTPATIENT
Start: 2021-04-08 | End: 2021-11-02 | Stop reason: SDUPTHER

## 2021-04-08 RX ORDER — AMITRIPTYLINE HYDROCHLORIDE 10 MG/1
TABLET, FILM COATED ORAL
COMMUNITY
Start: 2021-04-07

## 2021-04-08 ASSESSMENT — ENCOUNTER SYMPTOMS
CHEST TIGHTNESS: 0
COUGH: 0
CHOKING: 0
STRIDOR: 0
ABDOMINAL PAIN: 0
CONSTIPATION: 0
WHEEZING: 0
DIARRHEA: 0
SHORTNESS OF BREATH: 0

## 2021-04-08 NOTE — PROGRESS NOTES
Visit Information    Have you changed or started any medications since your last visit including any over-the-counter medicines, vitamins, or herbal medicines? no   Are you having any side effects from any of your medications? -  no  Have you stopped taking any of your medications? Is so, why? -  no    Have you seen any other physician or provider since your last visit? No  Have you had any other diagnostic tests since your last visit? No  Have you been seen in the emergency room and/or had an admission to a hospital since we last saw you? No  Have you had your routine dental cleaning in the past 6 months? no    Have you activated your [employer] account? If not, what are your barriers?  yes     Patient Care Team:  Aruna Scott DO as PCP - General (Family Medicine)  Aruna Scott DO as PCP - [facility] Provider  Padmini Randall MD as Consulting Physician (Rheumatology)  Shira Freeman as Consulting Physician (Orthopedic Surgery)  Lucia Dominguez MD as Consulting Physician (Gastroenterology)  Elisabeth Rivers DPM (Inactive) (Podiatry)  Theodore Reed MD as Consulting Physician (Cardiology)  Reta James MD as Consulting Physician (Urology)    Medical History Review  Past Medical, Family, and Social History reviewed and does contribute to the patient presenting condition    Health Maintenance   Topic Date Due    Hepatitis C screen  Never done    COVID-19 Vaccine (1) Never done    Flu vaccine (Season Ended) 11/09/2021 (Originally 9/1/2021)    Lipid screen  08/07/2021    Annual Wellness Visit (AWV)  03/13/2022    Potassium monitoring  04/05/2022    Creatinine monitoring  04/05/2022    DTaP/Tdap/Td vaccine (2 - Td) 02/18/2023    Pneumococcal 0-64 years Vaccine  Completed    HIV screen  Completed    Hepatitis A vaccine  Aged Out    Hepatitis B vaccine  Aged Out    Hib vaccine  Aged Out    Meningococcal (ACWY) vaccine  Aged Out    Varicella vaccine  Discontinued

## 2021-04-08 NOTE — PROGRESS NOTES
[facility]  [address] [address]  Dept: [phone]  Dept Fax: [phone]    [place] A Colpaertis a 39 y.o. male who presents today for his medical conditions/complaints as notedbelow. Chaitanya Ba is c/o of   Chief Complaint   Patient presents with    Discuss Labs    Hand Pain     puffy and tightness       HPI:     Review recent labs and in regards to sxs  All labs were normal except Testerone was very low at 80   psa was normal   Pt does have a lot of other chronic conditions  Severe fatigue lately  Decreased sex drive for a few years   Mood does fluctuate on medication for anxiety and depression   No si/hi   Energy levels are the biggest thing though recently     No hx of cardiac disease or stroke  No real fam hx  No hx of prostate issue sand psa normal     Hand Pain   The incident occurred more than 1 week ago. The incident occurred at home. There was no injury mechanism. The pain is present in the left wrist, right hand and left hand. The quality of the pain is described as aching and cramping. The pain does not radiate. The pain is at a severity of 1/10. Pertinent negatives include no chest pain, muscle weakness, numbness or tingling. The symptoms are aggravated by movement and lifting. He has tried ice, heat, elevation and rest for the symptoms. The treatment provided moderate relief.        Hemoglobin A1C (%)   Date Value   01/25/2019 5.3         ( goal A1C is < 7)   No results found for: LABMICR  LDL Cholesterol (mg/dL)   Date Value   08/07/2020 49   02/25/2020 01/25/2019 51       (goal LDL is <100)   AST (U/L)   Date Value   04/05/2021 24     ALT (U/L)   Date Value   04/05/2021 35     BUN (mg/dL)   Date Value   04/05/2021 18     BP Readings from Last 3 Encounters:   04/08/21 122/78   11/09/20 132/78   08/10/20 130/84          (goal 120/80)    Past Medical History:   Diagnosis Date    Chronic back pain     DDD (degenerative disc disease)     Depression 7/17/2017    Depression with anxiety     DJD (degenerative joint disease)     knees, ankles, spine    GERD (gastroesophageal reflux disease)     GERD with stricture 3/18/2015    Hyperlipidemia, mixed     Internal derangement of shoulder 5/21/2013    Left nephrolithiasis 5/5/2017    Left shoulder strain 5/21/2013    Obesity (BMI 30-39.9) 6/27/2019    Radiculopathy     cervical, and lumbar      Past Surgical History:   Procedure Laterality Date    BACK SURGERY      L4-L5, L5-S1    CARPAL TUNNEL RELEASE Left 06/24/14    Dr. Cruz Crowe Left 6/24/14    ulnar nerve relocation, Dr. Swati Cabrera.  EMG Left 4/3/14    Upper Dr. Ac Blas ARTHROSCOPY Left 2/1/13    Dr Swati Cabrera, torn meniscus    LUMBAR FUSION  07/17/2017    L4-L5-S1 with rods, screws, and cages. Dr Malena Maher, [facility] NECK SURGERY      4-5 fusion    NERVE SURGERY Left 11/24/15    Dr Jenny Sr, left ulnar nerve transposition, additional correction.  SHOULDER SURGERY      x2 right shoulder, x2 left    WISDOM TOOTH EXTRACTION      x4       Family History   Problem Relation Age of Onset    High Blood Pressure Mother     Thyroid Disease Mother     Heart Disease Father         MI X4, since age 48.  Thyroid Disease Father     Bipolar Disorder Brother     Arthritis Brother        Social History     Tobacco Use    Smoking status: Never Smoker    Smokeless tobacco: Never Used    Tobacco comment: trying to, is down to the 1/2 pack daily from 1. to start the Chantix soon, note also trying vaping.     Substance Use Topics    Alcohol use: No     Comment: rare, holiday only      Current Outpatient Medications   Medication Sig Dispense Refill    Testosterone Cypionate 200 MG/ML SOLN Inject 1 mL as directed every 14 days 5 mL 2    Omega-3 Fatty Acids (FISH OIL) 1000 MG CAPS       celecoxib (CELEBREX) 200 MG capsule TAKE 1 CAPSULE DAILY 90 capsule 5    omeprazole (PRILOSEC) 20 MG delayed release capsule TAKE 1 CAPSULE DAILY 90 capsule 5    rosuvastatin (CRESTOR) 10 MG tablet TAKE 1 TABLET DAILY 90 tablet 5    escitalopram (LEXAPRO) 10 MG tablet TAKE 1 TABLET DAILY 90 tablet 5    gabapentin (NEURONTIN) 300 MG capsule TAKE 1 CAPSULE THREE TIMES A DAY (SEDATION WARNING) 270 capsule 5    metoprolol tartrate (LOPRESSOR) 25 MG tablet Take 2 tablets by mouth daily Taking 50mg daily 90 tablet 5    montelukast (SINGULAIR) 10 MG tablet Take 1 tablet by mouth daily 90 tablet 5    SUMAtriptan (IMITREX) 100 MG tablet       PANCREATIN PO Take by mouth      buPROPion (WELLBUTRIN SR) 100 MG extended release tablet Take 100 mg by mouth 2 times daily      fluticasone (FLONASE) 50 MCG/ACT nasal spray 1 spray by Nasal route daily 2 sprays each nostril daily 3 Bottle 3    clobetasol (TEMOVATE) 0.05 % ointment Apply to rash twice daily (not face, armpit or groin) 30 g 2    valACYclovir (VALTREX) 1 g tablet take 2 tablets by mouth AT FIRST SIGN OF COLD SORE AND REPEAT IN 12 HOURS 12 tablet 3    aspirin-acetaminophen-caffeine (EXCEDRIN MIGRAINE) 250-250-65 MG per tablet Take 2 tablets by mouth daily as needed.  aspirin 81 MG EC tablet Take 81 mg by mouth daily.  amitriptyline (ELAVIL) 10 MG tablet        No current facility-administered medications for this visit. Allergies   Allergen Reactions    Shrimp Flavor Swelling     Hands swell up,  Positive allergy testing.      Zocor [Simvastatin] Other (See Comments)     Muscle pain / spasm          Health Maintenance   Topic Date Due    Hepatitis C screen  Never done    COVID-19 Vaccine (1) Never done    Flu vaccine (Season Ended) 11/09/2021 (Originally 9/1/2021)    Lipid screen  08/07/2021    Annual Wellness Visit (AWV)  03/13/2022    Potassium monitoring  04/05/2022    Creatinine monitoring  04/05/2022    DTaP/Tdap/Td vaccine (2 - Td) 02/18/2023    Pneumococcal 0-64 years Vaccine  Completed    HIV screen  Completed    Hepatitis A vaccine  Aged Out    Hepatitis B vaccine  Aged Out    Hib vaccine  Aged Out    Meningococcal (ACWY) vaccine  Aged Out    Varicella vaccine  Discontinued       Subjective:     Review of Systems   Constitutional: Positive for activity change and fatigue. Negative for appetite change, chills, diaphoresis, fever and unexpected weight change. Eyes: Negative for visual disturbance. Respiratory: Negative for cough, choking, chest tightness, shortness of breath, wheezing and stridor. Cardiovascular: Negative for chest pain, palpitations and leg swelling. Gastrointestinal: Negative for abdominal pain, constipation and diarrhea. Genitourinary: Negative for penile pain, penile swelling, scrotal swelling and testicular pain. Musculoskeletal: Positive for arthralgias and joint swelling. Skin: Negative for rash. Allergic/Immunologic: Negative for immunocompromised state. Neurological: Negative for dizziness, tingling, weakness, light-headedness, numbness and headaches. Psychiatric/Behavioral: Positive for behavioral problems and decreased concentration. Negative for dysphoric mood, hallucinations, self-injury, sleep disturbance and suicidal ideas. The patient is nervous/anxious. The patient is not hyperactive. Objective:      Physical Exam  Vitals signs and nursing note reviewed. Constitutional:       General: He is not in acute distress. Appearance: Normal appearance. He is obese. He is not ill-appearing, toxic-appearing or diaphoretic. HENT:      Head: Normocephalic and atraumatic. Neck:      Musculoskeletal: Normal range of motion and neck supple. Cardiovascular:      Rate and Rhythm: Normal rate and regular rhythm. Musculoskeletal:      Right wrist: Normal.      Left wrist: Normal.      Right hand: He exhibits decreased range of motion and swelling. He exhibits no tenderness and no bony tenderness. Normal sensation noted.  Normal strength noted. Left hand: He exhibits decreased range of motion and swelling. Normal sensation noted. Normal strength noted. Neurological:      General: No focal deficit present. Mental Status: He is alert and oriented to person, place, and time. Cranial Nerves: Cranial nerves are intact. Sensory: Sensation is intact. Motor: Motor function is intact. Deep Tendon Reflexes: Reflexes are normal and symmetric. /78   Pulse 72   Resp 18   Ht 6' 1\" (1.854 m)   Wt 254 lb (115.2 kg)   SpO2 98%   BMI 33.51 kg/m²     Assessment:       Diagnosis Orders   1. Low testosterone level in male  Testosterone Cypionate 200 MG/ML SOLN   2. Skin ulcer, limited to breakdown of skin (HCC)     3. Pain in both hands  Sedimentation Rate    Rheumatoid Factor    ENIO Screen with Reflex    Uric Acid    Brain Natriuretic Peptide   4. Swelling of both hands  Sedimentation Rate    Rheumatoid Factor    ENIO Screen with Reflex    Uric Acid    Brain Natriuretic Peptide   5. Unspecified abnormalities of breathing   Brain Natriuretic Peptide   6. Hypogonadism male     7. Other fatigue               Plan:       Return in about 5 weeks (around 5/13/2021), or if symptoms worsen or fail to improve.     Orders Placed This Encounter   Procedures    Sedimentation Rate     Standing Status:   Future     Standing Expiration Date:   4/8/2022    Rheumatoid Factor     Standing Status:   Future     Standing Expiration Date:   4/8/2022    ENIO Screen with Reflex     Standing Status:   Future     Standing Expiration Date:   4/8/2022    Uric Acid     Standing Status:   Future     Standing Expiration Date:   4/8/2022    Brain Natriuretic Peptide     Standing Status:   Future     Standing Expiration Date:   4/8/2022     Orders Placed This Encounter   Medications    Testosterone Cypionate 200 MG/ML SOLN     Sig: Inject 1 mL as directed every 14 days     Dispense:  5 mL     Refill:  2    check addl labs in regards to hand swelling and sxs   Elevate at rest , warm compresses  Motrin to see if this helps     Also trila Testerone 1 mL every 2 weeks to start   Will recheck Testerone and psa in 6 weeks   Call with q/c   reviewed risks in detail with patient for Testerone replacement   Pt verbalized understanding   F/u in 4-5 weeks for check up and will give labs then    Patientgiven educational materials - see patient instructions. Discussed use, benefit,and side effects of prescribed medications. All patient questions answered. Ptvoiced understanding. Reviewed health maintenance. Instructed to continue currentmedications, diet and exercise. Patient agreed with treatment plan. Follow up asdirected.      Electronically signed by Valerie Cadroso DO on 4/8/2021 at 4:26 PM

## 2021-04-12 ENCOUNTER — NURSE ONLY (OUTPATIENT)
Dept: FAMILY MEDICINE CLINIC | Age: 41
End: 2021-04-12
Payer: MEDICARE

## 2021-04-12 ENCOUNTER — HOSPITAL ENCOUNTER (OUTPATIENT)
Age: 41
Setting detail: SPECIMEN
Discharge: HOME | End: 2021-04-12
Payer: MEDICARE

## 2021-04-12 DIAGNOSIS — M79.89 SWELLING OF BOTH HANDS: ICD-10-CM

## 2021-04-12 DIAGNOSIS — R06.9 UNSPECIFIED ABNORMALITIES OF BREATHING: ICD-10-CM

## 2021-04-12 DIAGNOSIS — M79.642 PAIN IN BOTH HANDS: ICD-10-CM

## 2021-04-12 DIAGNOSIS — M79.641 PAIN IN BOTH HANDS: ICD-10-CM

## 2021-04-12 LAB
BNP INTERPRETATION: NORMAL
PRO-BNP: <20 PG/ML
RHEUMATOID FACTOR: <10 IU/ML
SEDIMENTATION RATE, ERYTHROCYTE: 5 MM (ref 0–15)
URIC ACID: 7.3 MG/DL (ref 3.4–7)

## 2021-04-12 PROCEDURE — 96372 THER/PROPH/DIAG INJ SC/IM: CPT | Performed by: INTERNAL MEDICINE

## 2021-04-12 RX ORDER — TESTOSTERONE CYPIONATE 200 MG/ML
200 INJECTION INTRAMUSCULAR ONCE
Status: COMPLETED | OUTPATIENT
Start: 2021-04-12 | End: 2021-04-12

## 2021-04-12 RX ADMIN — TESTOSTERONE CYPIONATE 200 MG: 200 INJECTION INTRAMUSCULAR at 16:29

## 2021-04-13 LAB — ANTI-NUCLEAR ANTIBODY (ANA): NEGATIVE

## 2021-04-26 ENCOUNTER — NURSE ONLY (OUTPATIENT)
Dept: FAMILY MEDICINE CLINIC | Age: 41
End: 2021-04-26
Payer: MEDICARE

## 2021-04-26 PROCEDURE — 96372 THER/PROPH/DIAG INJ SC/IM: CPT | Performed by: INTERNAL MEDICINE

## 2021-04-26 RX ORDER — TESTOSTERONE CYPIONATE 200 MG/ML
200 INJECTION INTRAMUSCULAR ONCE
Status: COMPLETED | OUTPATIENT
Start: 2021-04-26 | End: 2021-04-26

## 2021-04-26 RX ADMIN — TESTOSTERONE CYPIONATE 200 MG: 200 INJECTION INTRAMUSCULAR at 16:26

## 2021-04-26 NOTE — PROGRESS NOTES
After obtaining consent, and per orders of Dr. Yolande Mo, injection of testosterone given in Right vastus lateralis by Sandoval Jones. Patient instructed to remain in clinic for 20 minutes afterwards, and to report any adverse reaction to me immediately.

## 2021-05-10 ENCOUNTER — OFFICE VISIT (OUTPATIENT)
Dept: FAMILY MEDICINE CLINIC | Age: 41
End: 2021-05-10
Payer: MEDICARE

## 2021-05-10 VITALS
BODY MASS INDEX: 34.46 KG/M2 | HEART RATE: 84 BPM | OXYGEN SATURATION: 98 % | DIASTOLIC BLOOD PRESSURE: 84 MMHG | SYSTOLIC BLOOD PRESSURE: 132 MMHG | WEIGHT: 260 LBS | HEIGHT: 73 IN | RESPIRATION RATE: 18 BRPM

## 2021-05-10 DIAGNOSIS — R79.89 LOW SERUM TESTOSTERONE: ICD-10-CM

## 2021-05-10 DIAGNOSIS — Z76.89 ENCOUNTER TO ESTABLISH CARE: Primary | ICD-10-CM

## 2021-05-10 PROCEDURE — G8427 DOCREV CUR MEDS BY ELIG CLIN: HCPCS | Performed by: NURSE PRACTITIONER

## 2021-05-10 PROCEDURE — G8417 CALC BMI ABV UP PARAM F/U: HCPCS | Performed by: NURSE PRACTITIONER

## 2021-05-10 PROCEDURE — 96372 THER/PROPH/DIAG INJ SC/IM: CPT | Performed by: NURSE PRACTITIONER

## 2021-05-10 PROCEDURE — 1036F TOBACCO NON-USER: CPT | Performed by: NURSE PRACTITIONER

## 2021-05-10 PROCEDURE — 99203 OFFICE O/P NEW LOW 30 MIN: CPT | Performed by: NURSE PRACTITIONER

## 2021-05-10 RX ORDER — TESTOSTERONE CYPIONATE 200 MG/ML
200 INJECTION INTRAMUSCULAR ONCE
Status: COMPLETED | OUTPATIENT
Start: 2021-05-10 | End: 2021-05-10

## 2021-05-10 RX ADMIN — TESTOSTERONE CYPIONATE 200 MG: 200 INJECTION INTRAMUSCULAR at 14:52

## 2021-05-10 ASSESSMENT — ENCOUNTER SYMPTOMS
EYE PAIN: 0
SHORTNESS OF BREATH: 0
SINUS PAIN: 0
NAUSEA: 0
ABDOMINAL PAIN: 0
DIARRHEA: 0
SORE THROAT: 0
COUGH: 0
VOMITING: 0
BACK PAIN: 0

## 2021-05-10 NOTE — PROGRESS NOTES
[address] [facility]  [address] [address]  Dept: [phone]  Dept Fax: [phone]    Karen Garcia is a 39 y.o. male who presents today for his medicalconditions/complaints as noted below. Karen Garcia is c/o of Established New Doctor, Hypertension, and Immunizations (testosterone)      HPI:       49-year-old male patient presents with complaint of encounter to establish care    Patient reports he is doing well overall, no current complaints    Patient had recent laboratory studies found to have low serum total testosterone. Has started testosterone replacement via IM injections. Patient gets 200 mg every 2 weeks. Has previously had 2 doses today would be his third. Reports he has begun to feel slightly improved. Additionally patient has significant laboratory evaluation completed, significant found for elevated serum uric acid has not previously had any gout type flares. Significant medical history for GERD takes omeprazole    Significant medical history for hyperlipidemia takes fish oil, Crestor    Significant medical history of osteoarthritis, several back surgeries and nerve damage. Takes gabapentin, Celebrex. Significant history of hypertension takes metoprolol 25 twice daily    Significant history of headaches follows with neurologist Dr. Rosalba Anderson, takes Imitrex as needed, Excedrin as needed, recently started amitriptyline daily    Significant history of depression takes Lexapro 10 mg daily.     Significant history of allergic rhinitis takes Singulair, Flonase      Past Medical History:   Diagnosis Date    Chronic back pain     DDD (degenerative disc disease)     Depression 7/17/2017    Depression with anxiety     DJD (degenerative joint disease)     knees, ankles, spine    GERD (gastroesophageal reflux disease)     GERD with stricture 3/18/2015    Hyperlipidemia, mixed     Internal derangement of shoulder 5/21/2013    Left nephrolithiasis 5/5/2017    Left shoulder strain 5/21/2013    Obesity (BMI 30-39.9) 6/27/2019    Radiculopathy     cervical, and lumbar        Current Outpatient Medications   Medication Sig Dispense Refill    amitriptyline (ELAVIL) 10 MG tablet       Testosterone Cypionate 200 MG/ML SOLN Inject 1 mL as directed every 14 days 5 mL 2    Omega-3 Fatty Acids (FISH OIL) 1000 MG CAPS       celecoxib (CELEBREX) 200 MG capsule TAKE 1 CAPSULE DAILY 90 capsule 5    omeprazole (PRILOSEC) 20 MG delayed release capsule TAKE 1 CAPSULE DAILY 90 capsule 5    rosuvastatin (CRESTOR) 10 MG tablet TAKE 1 TABLET DAILY 90 tablet 5    escitalopram (LEXAPRO) 10 MG tablet TAKE 1 TABLET DAILY 90 tablet 5    gabapentin (NEURONTIN) 300 MG capsule TAKE 1 CAPSULE THREE TIMES A DAY (SEDATION WARNING) 270 capsule 5    metoprolol tartrate (LOPRESSOR) 25 MG tablet Take 2 tablets by mouth daily Taking 50mg daily 90 tablet 5    montelukast (SINGULAIR) 10 MG tablet Take 1 tablet by mouth daily 90 tablet 5    SUMAtriptan (IMITREX) 100 MG tablet       PANCREATIN PO Take by mouth      buPROPion (WELLBUTRIN SR) 100 MG extended release tablet Take 100 mg by mouth 2 times daily      fluticasone (FLONASE) 50 MCG/ACT nasal spray 1 spray by Nasal route daily 2 sprays each nostril daily 3 Bottle 3    clobetasol (TEMOVATE) 0.05 % ointment Apply to rash twice daily (not face, armpit or groin) 30 g 2    valACYclovir (VALTREX) 1 g tablet take 2 tablets by mouth AT FIRST SIGN OF COLD SORE AND REPEAT IN 12 HOURS 12 tablet 3    aspirin-acetaminophen-caffeine (EXCEDRIN MIGRAINE) 250-250-65 MG per tablet Take 2 tablets by mouth daily as needed.  aspirin 81 MG EC tablet Take 81 mg by mouth daily. No current facility-administered medications for this visit. Allergies   Allergen Reactions    Shrimp Flavor Swelling     Hands swell up,  Positive allergy testing.      Zocor [Simvastatin] Other (See Comments) 35.8     Lipase 04/05/2021 32     PSA 04/05/2021 0.61     Testosterone 04/05/2021 89*    TSH 04/05/2021 1.41     Glucose 04/05/2021 94     BUN 04/05/2021 18     CREATININE 04/05/2021 0.74     Bun/Cre Ratio 04/05/2021 NOT REPORTED     Calcium 04/05/2021 9.6     Sodium 04/05/2021 137     Potassium 04/05/2021 4.1     Chloride 04/05/2021 101     CO2 04/05/2021 23     Anion Gap 04/05/2021 13     Alkaline Phosphatase 04/05/2021 69     ALT 04/05/2021 35     AST 04/05/2021 24     Total Bilirubin 04/05/2021 0.66     Total Protein 04/05/2021 7.5     Albumin 04/05/2021 4.8     Albumin/Globulin Ratio 04/05/2021 1.8     GFR Non- 04/05/2021 >60     GFR  04/05/2021 >60     GFR Comment 04/05/2021          GFR Staging 04/05/2021 NOT REPORTED     WBC 04/05/2021 8.9     RBC 04/05/2021 5.44     Hemoglobin 04/05/2021 16.3     Hematocrit 04/05/2021 48.3     MCV 04/05/2021 88.8     MCH 04/05/2021 30.0     MCHC 04/05/2021 33.7     RDW 04/05/2021 12.8     Platelets 91/92/3547 248     MPV 04/05/2021 10.7     NRBC Automated 04/05/2021 0.0     Differential Type 04/05/2021 NOT REPORTED     Seg Neutrophils 04/05/2021 59     Lymphocytes 04/05/2021 31     Monocytes 04/05/2021 6     Eosinophils % 04/05/2021 2     Basophils 04/05/2021 1     Immature Granulocytes 04/05/2021 1*    Segs Absolute 04/05/2021 5.29     Absolute Lymph # 04/05/2021 2.73     Absolute Mono # 04/05/2021 0.53     Absolute Eos # 04/05/2021 0.14     Basophils Absolute 04/05/2021 0.09     Absolute Immature Granul* 04/05/2021 0.08     WBC Morphology 04/05/2021 NOT REPORTED     RBC Morphology 04/05/2021 NOT REPORTED     Platelet Estimate 00/22/7033  [facility] Outpatient Visit on 01/20/2021   Component Date Value    SARS-CoV-2 Antibody, Tot* 01/20/2021 [facility] Outpatient Visit on 12/28/2020   Component Date Value    SARS-CoV-2, CHANG 12/28/2020 Not Detected        Assessment and Plan      1. Encounter to establish care  -     Testosterone; Future  2. Low serum testosterone  Assessment & Plan:   Continue current injections, recheck in 2 weeks  Orders:  -     Testosterone; Future         Return if symptoms worsen or fail to improve. Orders Placed This Encounter   Medications    testosterone cypionate (DEPOTESTOTERONE CYPIONATE) injection 200 mg       Administrations This Visit     testosterone cypionate (DEPOTESTOTERONE CYPIONATE) injection 200 mg     Admin Date  05/10/2021 Action  Given Dose  200 mg Route  Intramuscular Administered By  Helen Serrano MA                   Patient given educational materials - see patient instructions. Discussed use, benefit, and side effects of prescribed medications. All patientquestions answered. Pt voiced understanding. Patient given educational materials - see patient instructions. Discussed use, benefit, and side effects of prescribed medications. All patientquestions answered. Pt voiced understanding. This note was transcribed using dictation with Dragon services. Efforts were made to correct any errors but some words may be misinterpreted.     Electronically signed by YAHAIRA Westbrook CNP on 5/10/2021at 3:47 PM

## 2021-05-10 NOTE — PROGRESS NOTES
Visit Information    Have you changed or started any medications since your last visit including any over-the-counter medicines, vitamins, or herbal medicines? no   Are you having any side effects from any of your medications? -  no  Have you stopped taking any of your medications? Is so, why? -  no    Have you seen any other physician or provider since your last visit? No  Have you had any other diagnostic tests since your last visit? No  Have you been seen in the emergency room and/or had an admission to a hospital since we last saw you? No  Have you had your routine dental cleaning in the past 6 months? no    Have you activated your [employer]t account? If not, what are your barriers?  Yes     Patient Care Team:  YAHAIRA Yu CNP as PCP - General (Certified Nurse Practitioner)  YAHAIRA Yu CNP as PCP - [facility]  Marin Sawyer MD as Consulting Physician (Rheumatology)  Annabel Dia as Consulting Physician (Orthopedic Surgery)  Anthony Cassidy MD as Consulting Physician (Gastroenterology)  Dileep Wright DPM (Inactive) (Podiatry)  Julia Crowe MD as Consulting Physician (Cardiology)  Matt Benjamin MD as Consulting Physician (Urology)    Medical History Review  Past Medical, Family, and Social History reviewed and does contribute to the patient presenting condition    Health Maintenance   Topic Date Due    Hepatitis C screen  Never done    COVID-19 Vaccine (1) Never done    Flu vaccine (Season Ended) 11/09/2021 (Originally 9/1/2021)    Lipid screen  08/07/2021    Annual Wellness Visit (AWV)  03/13/2022    Potassium monitoring  04/05/2022    Creatinine monitoring  04/05/2022    DTaP/Tdap/Td vaccine (2 - Td) 02/18/2023    Pneumococcal 0-64 years Vaccine  Completed    HIV screen  Completed    Hepatitis A vaccine  Aged Out    Hepatitis B vaccine  Aged Out    Hib vaccine  Aged Out    Meningococcal (ACWY) vaccine  Aged Out    Varicella vaccine Discontinued

## 2021-05-10 NOTE — PATIENT INSTRUCTIONS
Patient Education        testosterone injection  Pronunciation:  sujey TOS ter one  Brand: Aveed, Depo-Testosterone, Testosterone Cypionate, Testosterone Enanthate, Xyosted  What is the most important information I should know about testosterone injection? You should not be treated with testosterone if you have prostate cancer, male breast cancer, a serious heart condition, severe liver or kidney disease, or an allergy to castor oil or sesame oil. This medicine is not for use in treating low testosterone without certain medical conditions or due to getting older. Testosterone should not be used to enhance athletic performance. Testosterone injection is not for use in women who are pregnant. Testosterone can increase your risk of heart attack, stroke, or death. You may need to stop using testosterone or start taking blood pressure medication. Misuse of testosterone can cause dangerous or irreversible effects. Do not share this medicine with another person. What is testosterone injection? Testosterone is a naturally occurring sex hormone produced in a man's testicles. Small amounts of testosterone are also produced in a woman's ovaries and adrenal system. Testosterone injection is used in men and boys to treat conditions caused by a lack of this hormone, such as delayed puberty, impotence, or other hormonal imbalances. Testosterone injection is not for use  in treating low testosterone without certain medical conditions or due to getting older. Testosterone enanthate is used in women to treat breast cancer that has spread to other parts of the body (metastatic) and cannot be treated with surgery. Testosterone will not enhance athletic performance and should not be used for that purpose. Testosterone injection may also be used for purposes not listed in this medication guide. What should I discuss with my healthcare provider before receiving testosterone injection?   You should not be treated with this medicine if you are allergic to testosterone, or if you have:  · male breast cancer;  · prostate cancer;  · serious heart problems;  · severe liver disease;  · severe kidney disease; or  · an allergy to castor oil or sesame oil. Testosterone injection is not for use in women who are pregnant. This medicine can harm an unborn baby. Tell your doctor if you have ever had:  · high blood pressure;  · heart problems, coronary artery disease (clogged arteries);  · a heart attack or stroke;  · sleep apnea;  · an enlarged prostate and urination problems;  · high cholesterol or triglycerides;  · cancer;  · depression, anxiety, a mood disorder, suicidal thoughts or actions;  · diabetes;  · high red blood cell (RBC) counts; or  · liver or kidney disease. Using testosterone may increase your risk of developing prostate cancer, liver problems, or heart problems (including heart attack, stroke, or death). Ask your doctor about these risks. Women using testosterone should not breastfeed. Testosterone should not be given to a child younger than 15years old. Some types of this medicine are not approved for use by anyone younger than 25years old. How is testosterone injection given? Testosterone is injected under the skin or into a muscle, usually given every 2 to 4 weeks. Testosterone injections should be given only by a healthcare professional.  The length of treatment with testosterone injection will depend on the condition being treated. Testosterone can raise your blood pressure, which could increase your risk of heart attack, stroke, or death. Your blood pressure will need to be checked often. You may need to stop using testosterone or start taking blood pressure medication. You will need frequent blood tests. Testosterone can affect bone growth in boys who are treated for delayed puberty. Bone development may need to be checked with x-rays every 6 months during treatment.   This medicine can affect the results of decrease in testicle size;  · painful or difficult urination, increased urination at night, loss of bladder control;  · high levels of calcium in the blood --stomach pain, constipation, increased thirst or urination, muscle pain or weakness, joint pain, confusion, and feeling tired or restless; or  · high potassium level --nausea, weakness, tingly feeling, chest pain, irregular heartbeats, loss of movement;  · liver problems --right-sided upper stomach pain, vomiting, loss of appetite, dark urine, jaundice (yellowing of the skin or eyes). · signs of a blood clot deep in the body --swelling, warmth, or redness in an arm or leg;  · signs of a blood clot in the lung --chest pain, sudden cough, wheezing, rapid breathing, coughing up blood; or  · signs of a stroke --sudden numbness or weakness (especially on one side of the body), severe headache, slurred speech, balance problems. Women receiving testosterone may develop male characteristics, which could be irreversible if treatment is continued. Call your doctor at once if you notice any of these signs of excess testosterone:  · acne;  · changes in your menstrual periods (including missed periods);  · male-pattern hair growth (such as on the chin or chest);  · hoarse or deepened voice; or  · enlarged clitoris. Your testosterone injections may be delayed or permanently discontinued if you have certain side effects. Common side effects (in men or women) may include:  · breast swelling;  · acne, increased facial or body hair growth, male-pattern baldness;  · increased or decreased interest in sex;  · headache, anxiety, depressed mood;  · increased blood pressure;  · numbness or tingly feeling;  · abnormal liver function tests;  · high red blood cell counts (hematocrit or hemoglobin);  · increased PSA (prostate-specific antigen); or  · pain, bruising, bleeding, redness, or a hard lump where the medicine was injected.   This is not a complete list of side effects and others may occur. Call your doctor for medical advice about side effects. You may report side effects to FDA at 4-683-OOB-7822. What other drugs will affect testosterone injection? Tell your doctor about all your other medicines, especially:  · insulin or oral diabetes medicine;  · medicine to treat pain, cough, or cold symptoms;  · a blood thinner --warfarin, Coumadin, Jantoven; or  · steroid medicine --prednisone, dexamethasone, and others. This list is not complete. Other drugs may affect testosterone, including prescription and over-the-counter medicines, vitamins, and herbal products. Not all possible drug interactions are listed here. Where can I get more information? Your doctor or pharmacist can provide more information about testosterone injection. Remember, keep this and all other medicines out of the reach of children, never share your medicines with others, and use this medication only for the indication prescribed. Every effort has been made to ensure that the information provided by [address] is accurate, up-to-date, and complete, but no guarantee is made to that effect. Drug information contained herein may be time sensitive. [employer] information has been compiled for use by healthcare practitioners and consumers in the [place] and therefore [employer] does not warrant that uses outside of the [place] are appropriate, unless specifically indicated otherwise. [employer] drug information does not endorse drugs, diagnose patients or recommend therapy. [employer] drug information is an informational resource designed to assist licensed healthcare practitioners in caring for their patients and/or to serve consumers viewing this service as a supplement to, and not a substitute for, the expertise, skill, knowledge and judgment of healthcare practitioners.  The absence of a warning for a given drug or drug combination in no way should be construed to indicate that the drug or drug combination is safe, effective or appropriate for any given patient. [facility] does not assume any responsibility for any aspect of healthcare administered with the aid of information [facility] provides. The information contained herein is not intended to cover all possible uses, directions, precautions, warnings, drug interactions, allergic reactions, or adverse effects. If you have questions about the drugs you are taking, check with your doctor, nurse or pharmacist.  Copyright 9988-3235 [address]: 4.01. Revision date: 7/14/2020. Care instructions adapted under license by [facility] ([facility]). If you have questions about a medical condition or this instruction, always ask your healthcare professional. [place] [address] any warranty or liability for your use of this information.

## 2021-05-20 ENCOUNTER — HOSPITAL ENCOUNTER (OUTPATIENT)
Age: 41
Discharge: HOME | End: 2021-05-20
Payer: MEDICARE

## 2021-05-20 DIAGNOSIS — Z76.89 ENCOUNTER TO ESTABLISH CARE: ICD-10-CM

## 2021-05-20 DIAGNOSIS — R79.89 LOW SERUM TESTOSTERONE: ICD-10-CM

## 2021-05-20 LAB — TESTOSTERONE TOTAL: 526 NG/DL (ref 220–1000)

## 2021-05-20 PROCEDURE — 84403 ASSAY OF TOTAL TESTOSTERONE: CPT

## 2021-05-20 PROCEDURE — 36415 COLL VENOUS BLD VENIPUNCTURE: CPT

## 2021-05-24 ENCOUNTER — NURSE ONLY (OUTPATIENT)
Dept: FAMILY MEDICINE CLINIC | Age: 41
End: 2021-05-24
Payer: MEDICARE

## 2021-05-24 PROCEDURE — 96372 THER/PROPH/DIAG INJ SC/IM: CPT | Performed by: NURSE PRACTITIONER

## 2021-05-24 RX ORDER — TESTOSTERONE CYPIONATE 200 MG/ML
200 INJECTION INTRAMUSCULAR ONCE
Status: COMPLETED | OUTPATIENT
Start: 2021-05-24 | End: 2021-05-24

## 2021-05-24 RX ADMIN — TESTOSTERONE CYPIONATE 200 MG: 200 INJECTION INTRAMUSCULAR at 16:49

## 2021-06-21 ENCOUNTER — NURSE ONLY (OUTPATIENT)
Dept: FAMILY MEDICINE CLINIC | Age: 41
End: 2021-06-21
Payer: MEDICARE

## 2021-06-21 PROCEDURE — 96372 THER/PROPH/DIAG INJ SC/IM: CPT | Performed by: NURSE PRACTITIONER

## 2021-06-21 RX ORDER — TESTOSTERONE CYPIONATE 200 MG/ML
200 INJECTION INTRAMUSCULAR ONCE
Status: COMPLETED | OUTPATIENT
Start: 2021-06-21 | End: 2021-06-21

## 2021-06-21 RX ADMIN — TESTOSTERONE CYPIONATE 200 MG: 200 INJECTION INTRAMUSCULAR at 16:34

## 2021-07-06 ENCOUNTER — NURSE ONLY (OUTPATIENT)
Dept: FAMILY MEDICINE CLINIC | Age: 41
End: 2021-07-06
Payer: MEDICARE

## 2021-07-06 DIAGNOSIS — R79.89 LOW TESTOSTERONE LEVEL IN MALE: Primary | ICD-10-CM

## 2021-07-06 PROCEDURE — 96372 THER/PROPH/DIAG INJ SC/IM: CPT | Performed by: NURSE PRACTITIONER

## 2021-07-06 RX ORDER — TESTOSTERONE CYPIONATE 200 MG/ML
200 INJECTION INTRAMUSCULAR ONCE
Status: COMPLETED | OUTPATIENT
Start: 2021-07-06 | End: 2021-07-06

## 2021-07-06 RX ADMIN — TESTOSTERONE CYPIONATE 200 MG: 200 INJECTION INTRAMUSCULAR at 11:07

## 2021-07-06 NOTE — PROGRESS NOTES
After obtaining consent, and per orders of Dr. Ashely Isaac, injection of testosterone given in Left vastus lateralis by Chandrakant Mina MA. Patient instructed to remain in clinic for 20 minutes afterwards, and to report any adverse reaction to me immediately.

## 2021-07-20 ENCOUNTER — NURSE ONLY (OUTPATIENT)
Dept: FAMILY MEDICINE CLINIC | Age: 41
End: 2021-07-20
Payer: MEDICARE

## 2021-07-20 DIAGNOSIS — R79.89 LOW TESTOSTERONE LEVEL IN MALE: Primary | ICD-10-CM

## 2021-07-20 PROCEDURE — 96372 THER/PROPH/DIAG INJ SC/IM: CPT | Performed by: NURSE PRACTITIONER

## 2021-07-20 RX ORDER — TESTOSTERONE CYPIONATE 200 MG/ML
200 INJECTION INTRAMUSCULAR ONCE
Status: COMPLETED | OUTPATIENT
Start: 2021-07-20 | End: 2021-07-20

## 2021-07-20 RX ADMIN — TESTOSTERONE CYPIONATE 200 MG: 200 INJECTION INTRAMUSCULAR at 10:56

## 2021-07-20 NOTE — PROGRESS NOTES
After obtaining consent, and per orders of Dr. Jennifer Drew, injection of testoterone given in Right vastus lateralis by Dennis Rush MA. Patient instructed to remain in clinic for 20 minutes afterwards, and to report any adverse reaction to me immediately.

## 2021-07-27 DIAGNOSIS — Z13.220 LIPID SCREENING: Primary | ICD-10-CM

## 2021-08-03 ENCOUNTER — NURSE ONLY (OUTPATIENT)
Dept: FAMILY MEDICINE CLINIC | Age: 41
End: 2021-08-03
Payer: MEDICARE

## 2021-08-03 DIAGNOSIS — R79.89 LOW TESTOSTERONE LEVEL IN MALE: Primary | ICD-10-CM

## 2021-08-03 PROCEDURE — 96372 THER/PROPH/DIAG INJ SC/IM: CPT | Performed by: NURSE PRACTITIONER

## 2021-08-03 RX ORDER — TESTOSTERONE CYPIONATE 200 MG/ML
200 INJECTION INTRAMUSCULAR ONCE
Status: COMPLETED | OUTPATIENT
Start: 2021-08-03 | End: 2021-08-03

## 2021-08-03 RX ADMIN — TESTOSTERONE CYPIONATE 200 MG: 200 INJECTION INTRAMUSCULAR at 14:43

## 2021-08-17 ENCOUNTER — NURSE ONLY (OUTPATIENT)
Dept: FAMILY MEDICINE CLINIC | Age: 41
End: 2021-08-17
Payer: MEDICARE

## 2021-08-17 DIAGNOSIS — R79.89 LOW TESTOSTERONE LEVEL IN MALE: Primary | ICD-10-CM

## 2021-08-17 PROCEDURE — 96372 THER/PROPH/DIAG INJ SC/IM: CPT | Performed by: NURSE PRACTITIONER

## 2021-08-17 RX ORDER — TESTOSTERONE CYPIONATE 200 MG/ML
200 INJECTION INTRAMUSCULAR ONCE
Status: COMPLETED | OUTPATIENT
Start: 2021-08-17 | End: 2021-08-17

## 2021-08-17 RX ADMIN — TESTOSTERONE CYPIONATE 200 MG: 200 INJECTION INTRAMUSCULAR at 14:24

## 2021-08-31 ENCOUNTER — NURSE ONLY (OUTPATIENT)
Dept: FAMILY MEDICINE CLINIC | Age: 41
End: 2021-08-31
Payer: MEDICARE

## 2021-08-31 DIAGNOSIS — R79.89 LOW TESTOSTERONE LEVEL IN MALE: Primary | ICD-10-CM

## 2021-08-31 PROCEDURE — 96372 THER/PROPH/DIAG INJ SC/IM: CPT | Performed by: NURSE PRACTITIONER

## 2021-08-31 RX ORDER — TESTOSTERONE CYPIONATE 200 MG/ML
200 INJECTION INTRAMUSCULAR ONCE
Status: COMPLETED | OUTPATIENT
Start: 2021-08-31 | End: 2021-08-31

## 2021-08-31 RX ADMIN — TESTOSTERONE CYPIONATE 200 MG: 200 INJECTION INTRAMUSCULAR at 13:48

## 2021-09-14 ENCOUNTER — NURSE ONLY (OUTPATIENT)
Dept: FAMILY MEDICINE CLINIC | Age: 41
End: 2021-09-14
Payer: MEDICARE

## 2021-09-14 DIAGNOSIS — R79.89 LOW TESTOSTERONE LEVEL IN MALE: Primary | ICD-10-CM

## 2021-09-14 PROCEDURE — 96372 THER/PROPH/DIAG INJ SC/IM: CPT | Performed by: NURSE PRACTITIONER

## 2021-09-14 RX ORDER — TESTOSTERONE CYPIONATE 200 MG/ML
200 INJECTION INTRAMUSCULAR ONCE
Status: COMPLETED | OUTPATIENT
Start: 2021-09-14 | End: 2021-09-14

## 2021-09-14 RX ADMIN — TESTOSTERONE CYPIONATE 200 MG: 200 INJECTION INTRAMUSCULAR at 12:03

## 2021-09-14 NOTE — PROGRESS NOTES
After obtaining consent, and per orders of Dr. Mare Jackson, injection of Testosterone given in Right vastus lateralis by Gray Prater MA. Patient instructed to remain in clinic for 20 minutes afterwards, and to report any adverse reaction to me immediately.

## 2021-09-28 ENCOUNTER — NURSE ONLY (OUTPATIENT)
Dept: FAMILY MEDICINE CLINIC | Age: 41
End: 2021-09-28
Payer: MEDICARE

## 2021-09-28 DIAGNOSIS — R79.89 LOW TESTOSTERONE LEVEL IN MALE: Primary | ICD-10-CM

## 2021-09-28 PROCEDURE — 96372 THER/PROPH/DIAG INJ SC/IM: CPT | Performed by: NURSE PRACTITIONER

## 2021-09-28 RX ORDER — TESTOSTERONE CYPIONATE 200 MG/ML
200 INJECTION INTRAMUSCULAR ONCE
Status: COMPLETED | OUTPATIENT
Start: 2021-09-28 | End: 2021-09-28

## 2021-09-28 RX ADMIN — TESTOSTERONE CYPIONATE 200 MG: 200 INJECTION INTRAMUSCULAR at 13:41

## 2021-10-12 ENCOUNTER — NURSE ONLY (OUTPATIENT)
Dept: FAMILY MEDICINE CLINIC | Age: 41
End: 2021-10-12
Payer: MEDICARE

## 2021-10-12 DIAGNOSIS — R79.89 LOW TESTOSTERONE LEVEL IN MALE: Primary | ICD-10-CM

## 2021-10-12 PROCEDURE — 96372 THER/PROPH/DIAG INJ SC/IM: CPT | Performed by: NURSE PRACTITIONER

## 2021-10-12 RX ORDER — TESTOSTERONE CYPIONATE 200 MG/ML
200 INJECTION INTRAMUSCULAR ONCE
Status: COMPLETED | OUTPATIENT
Start: 2021-10-12 | End: 2021-10-12

## 2021-10-12 RX ADMIN — TESTOSTERONE CYPIONATE 200 MG: 200 INJECTION INTRAMUSCULAR at 13:32

## 2021-10-12 NOTE — PROGRESS NOTES
After obtaining consent, and per orders of Dr. Nereyda Vernon, injection of testosterone given in Right vastus lateralis by Jason Lua MA. Patient instructed to remain in clinic for 20 minutes afterwards, and to report any adverse reaction to me immediately.

## 2021-10-26 ENCOUNTER — NURSE ONLY (OUTPATIENT)
Dept: FAMILY MEDICINE CLINIC | Age: 41
End: 2021-10-26
Payer: MEDICARE

## 2021-10-26 DIAGNOSIS — R79.89 LOW TESTOSTERONE LEVEL IN MALE: Primary | ICD-10-CM

## 2021-10-26 PROCEDURE — 96372 THER/PROPH/DIAG INJ SC/IM: CPT | Performed by: NURSE PRACTITIONER

## 2021-10-26 RX ORDER — TESTOSTERONE CYPIONATE 200 MG/ML
200 INJECTION INTRAMUSCULAR ONCE
Status: COMPLETED | OUTPATIENT
Start: 2021-10-26 | End: 2021-10-26

## 2021-10-26 RX ADMIN — TESTOSTERONE CYPIONATE 200 MG: 200 INJECTION INTRAMUSCULAR at 14:49

## 2021-10-26 NOTE — PROGRESS NOTES
After obtaining consent, and per orders of Dr. Grupo King, injection of testosterone given in Left vastus lateralis by Pablito Hoover MA. Patient instructed to remain in clinic for 20 minutes afterwards, and to report any adverse reaction to me immediately.

## 2021-11-02 DIAGNOSIS — R79.89 LOW TESTOSTERONE LEVEL IN MALE: ICD-10-CM

## 2021-11-02 RX ORDER — TESTOSTERONE CYPIONATE 200 MG/ML
1 VIAL (ML) INTRAMUSCULAR
Qty: 5 ML | Refills: 2 | Status: SHIPPED | OUTPATIENT
Start: 2021-11-02 | End: 2022-07-06

## 2021-11-09 ENCOUNTER — OFFICE VISIT (OUTPATIENT)
Dept: FAMILY MEDICINE CLINIC | Age: 41
End: 2021-11-09
Payer: MEDICARE

## 2021-11-09 VITALS
RESPIRATION RATE: 18 BRPM | OXYGEN SATURATION: 98 % | BODY MASS INDEX: 32.6 KG/M2 | SYSTOLIC BLOOD PRESSURE: 138 MMHG | WEIGHT: 246 LBS | DIASTOLIC BLOOD PRESSURE: 84 MMHG | HEIGHT: 73 IN | HEART RATE: 74 BPM

## 2021-11-09 DIAGNOSIS — M54.42 CHRONIC BILATERAL LOW BACK PAIN WITH LEFT-SIDED SCIATICA: Primary | ICD-10-CM

## 2021-11-09 DIAGNOSIS — M43.22 CERVICAL VERTEBRAL FUSION: ICD-10-CM

## 2021-11-09 DIAGNOSIS — G89.29 CHRONIC BILATERAL LOW BACK PAIN WITH LEFT-SIDED SCIATICA: Primary | ICD-10-CM

## 2021-11-09 DIAGNOSIS — R79.89 LOW SERUM TESTOSTERONE: ICD-10-CM

## 2021-11-09 PROCEDURE — G8484 FLU IMMUNIZE NO ADMIN: HCPCS | Performed by: NURSE PRACTITIONER

## 2021-11-09 PROCEDURE — 96372 THER/PROPH/DIAG INJ SC/IM: CPT | Performed by: NURSE PRACTITIONER

## 2021-11-09 PROCEDURE — G8417 CALC BMI ABV UP PARAM F/U: HCPCS | Performed by: NURSE PRACTITIONER

## 2021-11-09 PROCEDURE — G8427 DOCREV CUR MEDS BY ELIG CLIN: HCPCS | Performed by: NURSE PRACTITIONER

## 2021-11-09 PROCEDURE — 99213 OFFICE O/P EST LOW 20 MIN: CPT | Performed by: NURSE PRACTITIONER

## 2021-11-09 PROCEDURE — 1036F TOBACCO NON-USER: CPT | Performed by: NURSE PRACTITIONER

## 2021-11-09 RX ORDER — TESTOSTERONE CYPIONATE 200 MG/ML
200 INJECTION INTRAMUSCULAR ONCE
Status: COMPLETED | OUTPATIENT
Start: 2021-11-09 | End: 2021-11-09

## 2021-11-09 RX ORDER — KETOROLAC TROMETHAMINE 30 MG/ML
60 INJECTION, SOLUTION INTRAMUSCULAR; INTRAVENOUS ONCE
Status: COMPLETED | OUTPATIENT
Start: 2021-11-09 | End: 2021-11-09

## 2021-11-09 RX ORDER — TIZANIDINE 2 MG/1
2 TABLET ORAL EVERY 8 HOURS PRN
Qty: 60 TABLET | Refills: 0 | Status: SHIPPED | OUTPATIENT
Start: 2021-11-09 | End: 2022-02-21 | Stop reason: SDUPTHER

## 2021-11-09 RX ADMIN — TESTOSTERONE CYPIONATE 200 MG: 200 INJECTION INTRAMUSCULAR at 15:37

## 2021-11-09 RX ADMIN — KETOROLAC TROMETHAMINE 60 MG: 30 INJECTION, SOLUTION INTRAMUSCULAR; INTRAVENOUS at 15:36

## 2021-11-09 ASSESSMENT — ENCOUNTER SYMPTOMS
SHORTNESS OF BREATH: 0
SINUS PAIN: 0
EYE PAIN: 0
COUGH: 0
VOMITING: 0
BACK PAIN: 1
ABDOMINAL PAIN: 0
SORE THROAT: 0
DIARRHEA: 0
NAUSEA: 0

## 2021-11-09 NOTE — PATIENT INSTRUCTIONS

## 2021-11-09 NOTE — PROGRESS NOTES
Visit Information    Have you changed or started any medications since your last visit including any over-the-counter medicines, vitamins, or herbal medicines? no   Are you having any side effects from any of your medications? -  no  Have you stopped taking any of your medications? Is so, why? -  no    Have you seen any other physician or provider since your last visit? No  Have you had any other diagnostic tests since your last visit? No  Have you been seen in the emergency room and/or had an admission to a hospital since we last saw you? No  Have you had your routine dental cleaning in the past 6 months? no    Have you activated your [employer] account? If not, what are your barriers?  Yes     Patient Care Team:  YAHAIRA Salgado CNP as PCP - General (Certified Nurse Practitioner)  [facility]  Reggie Wolf MD as Consulting Physician (Rheumatology)  Sandie Baires as Consulting Physician (Orthopedic Surgery)  Vivi Caruso MD as Consulting Physician (Gastroenterology)  Kaylee York DPM (Inactive) (Podiatry)  Audrey Szymanski MD as Consulting Physician (Cardiology)  Sarah Mcwilliams MD as Consulting Physician (Urology)    Medical History Review  Past Medical, Family, and Social History reviewed and does contribute to the patient presenting condition    Health Maintenance   Topic Date Due    Hepatitis C screen  Never done    COVID-19 Vaccine (1) Never done    Lipid screen  08/07/2021    Flu vaccine (1) 09/01/2021    Potassium monitoring  04/05/2022    Creatinine monitoring  04/05/2022    DTaP/Tdap/Td vaccine (2 - Td or Tdap) 02/18/2023    Pneumococcal 0-64 years Vaccine (2 of 2 - PPSV23) 01/12/2045    HIV screen  Completed    Hepatitis A vaccine  Aged Out    Hepatitis B vaccine  Aged Out    Hib vaccine  Aged Out    Meningococcal (ACWY) vaccine  Aged Out    Varicella vaccine  Discontinued

## 2021-11-09 NOTE — PROGRESS NOTES
Acids (FISH OIL) 1000 MG CAPS       celecoxib (CELEBREX) 200 MG capsule TAKE 1 CAPSULE DAILY 90 capsule 5    omeprazole (PRILOSEC) 20 MG delayed release capsule TAKE 1 CAPSULE DAILY 90 capsule 5    rosuvastatin (CRESTOR) 10 MG tablet TAKE 1 TABLET DAILY 90 tablet 5    escitalopram (LEXAPRO) 10 MG tablet TAKE 1 TABLET DAILY 90 tablet 5    gabapentin (NEURONTIN) 300 MG capsule TAKE 1 CAPSULE THREE TIMES A DAY (SEDATION WARNING) 270 capsule 5    montelukast (SINGULAIR) 10 MG tablet Take 1 tablet by mouth daily 90 tablet 5    SUMAtriptan (IMITREX) 100 MG tablet       PANCREATIN PO Take by mouth      fluticasone (FLONASE) 50 MCG/ACT nasal spray 1 spray by Nasal route daily 2 sprays each nostril daily 3 Bottle 3    clobetasol (TEMOVATE) 0.05 % ointment Apply to rash twice daily (not face, armpit or groin) 30 g 2    aspirin-acetaminophen-caffeine (EXCEDRIN MIGRAINE) 250-250-65 MG per tablet Take 2 tablets by mouth daily as needed.  aspirin 81 MG EC tablet Take 81 mg by mouth daily.  buPROPion (WELLBUTRIN SR) 100 MG extended release tablet Take 100 mg by mouth 2 times daily (Patient not taking: Reported on 11/9/2021)      valACYclovir (VALTREX) 1 g tablet take 2 tablets by mouth AT FIRST SIGN OF COLD SORE AND REPEAT IN 12 HOURS (Patient not taking: Reported on 11/9/2021) 12 tablet 3     Current Facility-Administered Medications   Medication Dose Route Frequency Provider Last Rate Last Admin    testosterone cypionate (DEPOTESTOTERONE CYPIONATE) injection 200 mg  200 mg IntraMUSCular Once Denia Baltazar, APRN - CNP        ketorolac (TORADOL) injection 60 mg  60 mg IntraMUSCular Once Denia Delaney, APRN - CNP         Allergies   Allergen Reactions    Shrimp Flavor Swelling     Hands swell up,  Positive allergy testing.  Zocor [Simvastatin] Other (See Comments)     Muscle pain / spasm       Subjective:      Review of Systems   Constitutional: Negative for chills and fatigue.    HENT: Negative for congestion, ear pain, sinus pain and sore throat. Eyes: Negative for pain and visual disturbance. Respiratory: Negative for cough and shortness of breath. Cardiovascular: Negative for chest pain and palpitations. Gastrointestinal: Negative for abdominal pain, diarrhea, nausea and vomiting. Genitourinary: Negative for penile pain and testicular pain. Musculoskeletal: Positive for back pain, neck pain and neck stiffness. Negative for joint swelling. Skin: Negative for rash. Neurological: Negative for dizziness and light-headedness. Hematological: Does not bruise/bleed easily. All other systems reviewed and are negative.      :Objective     Physical Exam  Vitals and nursing note reviewed. Constitutional:       General: He is not in acute distress. Appearance: Normal appearance. He is not toxic-appearing. Cardiovascular:      Rate and Rhythm: Normal rate. Pulmonary:      Effort: Pulmonary effort is normal.      Breath sounds: Normal breath sounds. Musculoskeletal:      Cervical back: Spasms present. No bony tenderness. Lumbar back: Spasms present. No bony tenderness. Back:    Skin:     General: Skin is warm and dry. Neurological:      General: No focal deficit present. Mental Status: He is alert and oriented to person, place, and time. /84   Pulse 74   Resp 18   Ht 6' 1\" (1.854 m)   Wt 246 lb (111.6 kg)   SpO2 98%   BMI 32.46 kg/m²     Lab Review   Hospital Outpatient Visit on 05/20/2021   Component Date Value    Testosterone 05/20/2021 526          EXAMINATION:   CT OF THE LUMBAR SPINE WITHOUT CONTRAST  8/27/2020       TECHNIQUE:   CT of the lumbar spine was performed without the administration of   intravenous contrast. Multiplanar reformatted images are provided for review.    Dose modulation, iterative reconstruction, and/or weight based adjustment of   the mA/kV was utilized to reduce the radiation dose to as low as reasonably achievable.       COMPARISON:   08/07/2020 radiograph       HISTORY:   ORDERING SYSTEM PROVIDED HISTORY: Retrolisthesis   TECHNOLOGIST PROVIDED HISTORY:   Reason for Exam: nwe onset pain, hx lumbar fusion, 2017   Acuity: Acute   Type of Exam: Initial       FINDINGS:   BONES/ALIGNMENT: There is prior postsurgical change of fusion and discectomy   at L4-S1.  No evidence of hardware complication or fracture.  No acute   fracture or subluxation otherwise noted throughout the lumbar spine.       DEGENERATIVE CHANGES: No other levels of significant degenerative change.       SOFT TISSUES/RETROPERITONEUM: No paraspinal mass is seen.           Impression   Stable postsurgical changes at L4-S1 with no acute finding.             Assessment and Plan      1. Chronic bilateral low back pain with left-sided sciatica  -     ketorolac (TORADOL) injection 60 mg; 60 mg, IntraMUSCular, ONCE, On Tue 11/9/21 at 1500, For 1 doseDo not administer for more than 5 days. -     tiZANidine (ZANAFLEX) 2 MG tablet; Take 1 tablet by mouth every 8 hours as needed (back pain), Disp-60 tablet, R-0Normal  2. Cervical vertebral fusion  -     ketorolac (TORADOL) injection 60 mg; 60 mg, IntraMUSCular, ONCE, On Tue 11/9/21 at 1500, For 1 doseDo not administer for more than 5 days. -     tiZANidine (ZANAFLEX) 2 MG tablet; Take 1 tablet by mouth every 8 hours as needed (back pain), Disp-60 tablet, R-0Normal  3. Low serum testosterone  -     testosterone cypionate (DEPOTESTOTERONE CYPIONATE) injection 200 mg; 200 mg, IntraMUSCular, ONCE, On Tue 11/9/21 at 1500, For 1 dose       IM injection of Toradol  Hold additional NSAIDs for 8 hours  Continue Celebrex gabapentin  We will add Zanaflex, recommend heat, gentle massage to affected areas  If pain persists we will update imaging          No results found for this visit on 11/09/21. Return if symptoms worsen or fail to improve.         Orders Placed This Encounter   Medications    testosterone cypionate (DEPOTESTOTERONE CYPIONATE) injection 200 mg    ketorolac (TORADOL) injection 60 mg    tiZANidine (ZANAFLEX) 2 MG tablet     Sig: Take 1 tablet by mouth every 8 hours as needed (back pain)     Dispense:  60 tablet     Refill:  0        Patient given educational materials - see patient instructions. Discussed use, benefit, and side effects of prescribed medications. All patientquestions answered. Pt voiced understanding. Patient given educational materials - see patient instructions. Discussed use, benefit, and side effects of prescribed medications. All patientquestions answered. Pt voiced understanding. This note was transcribed using dictation with Dragon services. Efforts were made to correct any errors but some words may be misinterpreted.     Electronically signed by YAHAIRA Christianson CNP on 11/9/2021at 3:00 PM

## 2021-11-09 NOTE — PROGRESS NOTES
After obtaining consent, and per orders of Dr. Spencer , injection of testosterone and toradol given by Asim Armendariz MA. Patient instructed to remain in clinic for 20 minutes afterwards, and to report any adverse reaction to me immediately.

## 2021-11-16 ENCOUNTER — OFFICE VISIT (OUTPATIENT)
Dept: FAMILY MEDICINE CLINIC | Age: 41
End: 2021-11-16
Payer: MEDICARE

## 2021-11-16 VITALS
WEIGHT: 247 LBS | BODY MASS INDEX: 32.74 KG/M2 | DIASTOLIC BLOOD PRESSURE: 86 MMHG | HEART RATE: 79 BPM | OXYGEN SATURATION: 97 % | HEIGHT: 73 IN | SYSTOLIC BLOOD PRESSURE: 134 MMHG | TEMPERATURE: 97 F

## 2021-11-16 DIAGNOSIS — M54.42 CHRONIC BILATERAL LOW BACK PAIN WITH LEFT-SIDED SCIATICA: Primary | ICD-10-CM

## 2021-11-16 DIAGNOSIS — M43.22 CERVICAL VERTEBRAL FUSION: ICD-10-CM

## 2021-11-16 DIAGNOSIS — G89.29 CHRONIC BILATERAL LOW BACK PAIN WITH LEFT-SIDED SCIATICA: Primary | ICD-10-CM

## 2021-11-16 DIAGNOSIS — M51.36 BULGING OF LUMBAR INTERVERTEBRAL DISC: ICD-10-CM

## 2021-11-16 DIAGNOSIS — M48.062 SPINAL STENOSIS OF LUMBAR REGION WITH NEUROGENIC CLAUDICATION: ICD-10-CM

## 2021-11-16 PROCEDURE — G8417 CALC BMI ABV UP PARAM F/U: HCPCS | Performed by: NURSE PRACTITIONER

## 2021-11-16 PROCEDURE — 1036F TOBACCO NON-USER: CPT | Performed by: NURSE PRACTITIONER

## 2021-11-16 PROCEDURE — 99213 OFFICE O/P EST LOW 20 MIN: CPT | Performed by: NURSE PRACTITIONER

## 2021-11-16 PROCEDURE — G8484 FLU IMMUNIZE NO ADMIN: HCPCS | Performed by: NURSE PRACTITIONER

## 2021-11-16 PROCEDURE — G8427 DOCREV CUR MEDS BY ELIG CLIN: HCPCS | Performed by: NURSE PRACTITIONER

## 2021-11-16 RX ORDER — PREDNISONE 20 MG/1
20 TABLET ORAL DAILY
Qty: 15 TABLET | Refills: 0 | Status: SHIPPED | OUTPATIENT
Start: 2021-11-16 | End: 2022-02-21

## 2021-11-16 RX ORDER — HYDROCODONE BITARTRATE AND ACETAMINOPHEN 5; 325 MG/1; MG/1
1 TABLET ORAL EVERY 8 HOURS PRN
Qty: 20 TABLET | Refills: 0 | Status: SHIPPED | OUTPATIENT
Start: 2021-11-16 | End: 2021-11-23

## 2021-11-16 ASSESSMENT — ENCOUNTER SYMPTOMS
BACK PAIN: 1
EYE PAIN: 0
SINUS PAIN: 0
SHORTNESS OF BREATH: 0
VOMITING: 0
SORE THROAT: 0
NAUSEA: 0
DIARRHEA: 0
COUGH: 0
ABDOMINAL PAIN: 0

## 2021-11-16 NOTE — PROGRESS NOTES
Visit Information    Have you changed or started any medications since your last visit including any over-the-counter medicines, vitamins, or herbal medicines? no   Are you having any side effects from any of your medications? -  no  Have you stopped taking any of your medications? Is so, why? -  no    Have you seen any other physician or provider since your last visit? No  Have you had any other diagnostic tests since your last visit? No  Have you been seen in the emergency room and/or had an admission to a hospital since we last saw you? No  Have you had your routine dental cleaning in the past 6 months? no    Have you activated your [employer] account? If not, what are your barriers?  Yes     Patient Care Team:  YAHAIRA Vargas CNP as PCP - General (Certified Nurse Practitioner)  YAHAIRA Vargas CNP as PCP - [facility]  Rahul Lipscomb MD as Consulting Physician (Rheumatology)  Wilfredo Kay as Consulting Physician (Orthopedic Surgery)  Laura Lorenzo MD as Consulting Physician (Gastroenterology)  Ac Morgan DPM (Inactive) (Podiatry)  Fiona Soto MD as Consulting Physician (Cardiology)  Francoise Mercado MD as Consulting Physician (Urology)    Medical History Review  Past Medical, Family, and Social History reviewed and  contribute to the patient presenting condition    Health Maintenance   Topic Date Due    Hepatitis C screen  Never done    COVID-19 Vaccine (1) Never done    Lipid screen  08/07/2021    Flu vaccine (1) 09/01/2021    Potassium monitoring  04/05/2022    Creatinine monitoring  04/05/2022    DTaP/Tdap/Td vaccine (2 - Td or Tdap) 02/18/2023    Pneumococcal 0-64 years Vaccine (2 of 2 - PPSV23) 01/12/2045    HIV screen  Completed    Hepatitis A vaccine  Aged Out    Hepatitis B vaccine  Aged Out    Hib vaccine  Aged Out    Meningococcal (ACWY) vaccine  Aged Out    Varicella vaccine  Discontinued

## 2021-11-16 NOTE — PATIENT INSTRUCTIONS

## 2021-11-16 NOTE — PROGRESS NOTES
[facility]  [address] [facility] [address] [address]  Dept: [phone]  Dept Fax: [phone]    Aidan Vigil is a 39 y.o. male who presents today for his medicalconditions/complaints as noted below. Aidan Vigil is c/o of Back Pain (follow up chroninc lower back pain), Extremity Weakness (left leg wants to give out if he puts all his pressure on it), and Neck Pain (5 weeks)      HPI:         42-year-old male patient presents with complaints of follow-up on chronic neck and back pain. Patient has significant history of lumbar spine and cervical spine surgeries. Patient last saw neurosurgeon Dr. Fer Landa for lumbar fusion. Denies any recent injury or trauma. Reports over the past 2 weeks he has had acute exacerbation of pain. Worse to the left lower back radiating down the left lower extremity. Worse when he stands on the left leg. Additionally reports pain to the left side of the neck worse with head rotation towards the right. Last week was trialed on Toradol and Zanaflex and had no significant improvement. Chronically treats with gabapentin, Celebrex. No loss of bladder bowel control. Denies saddle anesthesia.       Past Medical History:   Diagnosis Date    Chronic back pain     DDD (degenerative disc disease)     Depression 7/17/2017    Depression with anxiety     DJD (degenerative joint disease)     knees, ankles, spine    GERD (gastroesophageal reflux disease)     GERD with stricture 3/18/2015    Hyperlipidemia, mixed     Internal derangement of shoulder 5/21/2013    Left nephrolithiasis 5/5/2017    Left shoulder strain 5/21/2013    Obesity (BMI 30-39.9) 6/27/2019    Radiculopathy     cervical, and lumbar        Current Outpatient Medications   Medication Sig Dispense Refill    predniSONE (DELTASONE) 20 MG tablet Take 1 tablet by mouth daily Take 3 tabs po x 2 days, 2 tabs po x 3 days, 1 tab po x 2 days, half tab po x 2 days 15 tablet 0    HYDROcodone-acetaminophen (NORCO) 5-325 MG per tablet Take 1 tablet by mouth every 8 hours as needed for Pain for up to 7 days. Intended supply: 3 days. Take lowest dose possible to manage pain 20 tablet 0    tiZANidine (ZANAFLEX) 2 MG tablet Take 1 tablet by mouth every 8 hours as needed (back pain) 60 tablet 0    Testosterone Cypionate 200 MG/ML SOLN Inject 1 mL as directed every 14 days 5 mL 2    metoprolol tartrate (LOPRESSOR) 25 MG tablet Take 2 tablets by mouth daily Taking 50mg daily 90 tablet 0    amitriptyline (ELAVIL) 10 MG tablet       Omega-3 Fatty Acids (FISH OIL) 1000 MG CAPS       celecoxib (CELEBREX) 200 MG capsule TAKE 1 CAPSULE DAILY 90 capsule 5    omeprazole (PRILOSEC) 20 MG delayed release capsule TAKE 1 CAPSULE DAILY 90 capsule 5    rosuvastatin (CRESTOR) 10 MG tablet TAKE 1 TABLET DAILY 90 tablet 5    escitalopram (LEXAPRO) 10 MG tablet TAKE 1 TABLET DAILY 90 tablet 5    gabapentin (NEURONTIN) 300 MG capsule TAKE 1 CAPSULE THREE TIMES A DAY (SEDATION WARNING) 270 capsule 5    montelukast (SINGULAIR) 10 MG tablet Take 1 tablet by mouth daily 90 tablet 5    SUMAtriptan (IMITREX) 100 MG tablet       PANCREATIN PO Take by mouth      buPROPion (WELLBUTRIN SR) 100 MG extended release tablet Take 100 mg by mouth 2 times daily       fluticasone (FLONASE) 50 MCG/ACT nasal spray 1 spray by Nasal route daily 2 sprays each nostril daily 3 Bottle 3    clobetasol (TEMOVATE) 0.05 % ointment Apply to rash twice daily (not face, armpit or groin) 30 g 2    valACYclovir (VALTREX) 1 g tablet take 2 tablets by mouth AT FIRST SIGN OF COLD SORE AND REPEAT IN 12 HOURS 12 tablet 3    aspirin-acetaminophen-caffeine (EXCEDRIN MIGRAINE) 250-250-65 MG per tablet Take 2 tablets by mouth daily as needed.  aspirin 81 MG EC tablet Take 81 mg by mouth daily. No current facility-administered medications for this visit.      Allergies   Allergen Reactions    Shrimp Flavor Swelling     Hands swell up,  Positive allergy testing.  Zocor [Simvastatin] Other (See Comments)     Muscle pain / spasm       Subjective:      Review of Systems   Constitutional: Negative for chills and fatigue. HENT: Negative for congestion, ear pain, sinus pain and sore throat. Eyes: Negative for pain and visual disturbance. Respiratory: Negative for cough and shortness of breath. Cardiovascular: Negative for chest pain and palpitations. Gastrointestinal: Negative for abdominal pain, diarrhea, nausea and vomiting. Genitourinary: Negative for penile pain and testicular pain. Musculoskeletal: Positive for back pain and neck pain. Negative for joint swelling. Skin: Negative for rash. Neurological: Negative for dizziness and light-headedness. Hematological: Does not bruise/bleed easily. All other systems reviewed and are negative.      :Objective     Physical Exam  Vitals and nursing note reviewed. Constitutional:       General: He is not in acute distress. Appearance: Normal appearance. He is not toxic-appearing. Cardiovascular:      Rate and Rhythm: Normal rate. Pulmonary:      Effort: Pulmonary effort is normal.      Breath sounds: Normal breath sounds. Musculoskeletal:      Cervical back: Spasms, tenderness and bony tenderness present. Lumbar back: Spasms, tenderness and bony tenderness present. Neurological:      General: No focal deficit present. Mental Status: He is alert and oriented to person, place, and time.        /86 (Site: Left Upper Arm, Position: Sitting, Cuff Size: Large Adult)   Pulse 79   Temp 97 °F (36.1 °C) (Tympanic)   Ht 6' 1\" (1.854 m)   Wt 247 lb (112 kg)   SpO2 97%   BMI 32.59 kg/m²     Lab Review   Hospital Outpatient Visit on 05/20/2021   Component Date Value    Testosterone 05/20/2021 526        EXAMINATION:   CT OF THE LUMBAR SPINE WITHOUT CONTRAST  8/27/2020       TECHNIQUE:   CT of the lumbar spine was performed without the administration of   intravenous contrast. Multiplanar reformatted images are provided for review. Dose modulation, iterative reconstruction, and/or weight based adjustment of   the mA/kV was utilized to reduce the radiation dose to as low as reasonably   achievable.       COMPARISON:   08/07/2020 radiograph       HISTORY:   ORDERING SYSTEM PROVIDED HISTORY: Retrolisthesis   TECHNOLOGIST PROVIDED HISTORY:   Reason for Exam: nwe onset pain, hx lumbar fusion, 2017   Acuity: Acute   Type of Exam: Initial       FINDINGS:   BONES/ALIGNMENT: There is prior postsurgical change of fusion and discectomy   at L4-S1.  No evidence of hardware complication or fracture.  No acute   fracture or subluxation otherwise noted throughout the lumbar spine.       DEGENERATIVE CHANGES: No other levels of significant degenerative change.       SOFT TISSUES/RETROPERITONEUM: No paraspinal mass is seen.           Impression   Stable postsurgical changes at L4-S1 with no acute finding. Assessment and Plan      1. Chronic bilateral low back pain with left-sided sciatica  -     predniSONE (DELTASONE) 20 MG tablet; Take 1 tablet by mouth daily Take 3 tabs po x 2 days, 2 tabs po x 3 days, 1 tab po x 2 days, half tab po x 2 days, Disp-15 tablet, R-0Normal  -     HYDROcodone-acetaminophen (NORCO) 5-325 MG per tablet; Take 1 tablet by mouth every 8 hours as needed for Pain for up to 7 days. Intended supply: 3 days. Take lowest dose possible to manage pain, Disp-20 tablet, R-0Normal  2. Cervical vertebral fusion  -     predniSONE (DELTASONE) 20 MG tablet; Take 1 tablet by mouth daily Take 3 tabs po x 2 days, 2 tabs po x 3 days, 1 tab po x 2 days, half tab po x 2 days, Disp-15 tablet, R-0Normal  -     HYDROcodone-acetaminophen (NORCO) 5-325 MG per tablet; Take 1 tablet by mouth every 8 hours as needed for Pain for up to 7 days. Intended supply: 3 days. Take lowest dose possible to manage pain, Disp-20 tablet, R-0Normal  3.  Spinal stenosis of lumbar region with neurogenic claudication  -     predniSONE (DELTASONE) 20 MG tablet; Take 1 tablet by mouth daily Take 3 tabs po x 2 days, 2 tabs po x 3 days, 1 tab po x 2 days, half tab po x 2 days, Disp-15 tablet, R-0Normal  -     HYDROcodone-acetaminophen (NORCO) 5-325 MG per tablet; Take 1 tablet by mouth every 8 hours as needed for Pain for up to 7 days. Intended supply: 3 days. Take lowest dose possible to manage pain, Disp-20 tablet, R-0Normal  4. Bulging of lumbar intervertebral disc  -     predniSONE (DELTASONE) 20 MG tablet; Take 1 tablet by mouth daily Take 3 tabs po x 2 days, 2 tabs po x 3 days, 1 tab po x 2 days, half tab po x 2 days, Disp-15 tablet, R-0Normal  -     HYDROcodone-acetaminophen (NORCO) 5-325 MG per tablet; Take 1 tablet by mouth every 8 hours as needed for Pain for up to 7 days. Intended supply: 3 days. Take lowest dose possible to manage pain, Disp-20 tablet, R-0Normal     Controlled Substance Monitoring:    Acute and Chronic Pain Monitoring:   RX Monitoring 11/16/2021   Attestation -   Periodic Controlled Substance Monitoring No signs of potential drug abuse or diversion identified. Prescribe short burst of prednisone, as needed Norco in addition to gabapentin, Celebrex and Zanaflex  Recommend to contact his neurosurgeon Dr. Keyonna Beck for f/u            No results found for this visit on 11/16/21. Return in about 6 months (around 5/16/2022), or if symptoms worsen or fail to improve. Orders Placed This Encounter   Medications    predniSONE (DELTASONE) 20 MG tablet     Sig: Take 1 tablet by mouth daily Take 3 tabs po x 2 days, 2 tabs po x 3 days, 1 tab po x 2 days, half tab po x 2 days     Dispense:  15 tablet     Refill:  0    HYDROcodone-acetaminophen (NORCO) 5-325 MG per tablet     Sig: Take 1 tablet by mouth every 8 hours as needed for Pain for up to 7 days. Intended supply: 3 days.  Take lowest dose possible to manage pain     Dispense:  20 tablet Refill:  0     Reduce doses taken as pain becomes manageable        Patient given educational materials - see patient instructions. Discussed use, benefit, and side effects of prescribed medications. All patientquestions answered. Pt voiced understanding. Patient given educational materials - see patient instructions. Discussed use, benefit, and side effects of prescribed medications. All patientquestions answered. Pt voiced understanding. This note was transcribed using dictation with Dragon services. Efforts were made to correct any errors but some words may be misinterpreted.     Electronically signed by YAHAIRA Garcia CNP on 11/16/2021at 10:10 AM

## 2021-11-23 ENCOUNTER — NURSE ONLY (OUTPATIENT)
Dept: FAMILY MEDICINE CLINIC | Age: 41
End: 2021-11-23
Payer: MEDICARE

## 2021-11-23 DIAGNOSIS — R79.89 LOW SERUM TESTOSTERONE: Primary | ICD-10-CM

## 2021-11-23 PROCEDURE — 96372 THER/PROPH/DIAG INJ SC/IM: CPT | Performed by: NURSE PRACTITIONER

## 2021-11-23 RX ORDER — TESTOSTERONE CYPIONATE 200 MG/ML
200 INJECTION INTRAMUSCULAR ONCE
Status: COMPLETED | OUTPATIENT
Start: 2021-11-23 | End: 2021-11-23

## 2021-11-23 RX ADMIN — TESTOSTERONE CYPIONATE 200 MG: 200 INJECTION INTRAMUSCULAR at 13:10

## 2021-11-23 NOTE — PROGRESS NOTES
After obtaining consent, and per orders of Sebas Tomlinson NP, injection of Testosterone 200mg given in Left upper quad. gluteus by Krystyna Oliver MA. Patient instructed to remain in clinic for 20 minutes afterwards, and to report any adverse reaction to me immediately.

## 2021-12-07 ENCOUNTER — NURSE ONLY (OUTPATIENT)
Dept: FAMILY MEDICINE CLINIC | Age: 41
End: 2021-12-07
Payer: MEDICARE

## 2021-12-07 DIAGNOSIS — R79.89 LOW SERUM TESTOSTERONE: Primary | ICD-10-CM

## 2021-12-07 PROCEDURE — 96372 THER/PROPH/DIAG INJ SC/IM: CPT | Performed by: NURSE PRACTITIONER

## 2021-12-07 RX ORDER — TESTOSTERONE CYPIONATE 200 MG/ML
200 INJECTION INTRAMUSCULAR ONCE
Status: COMPLETED | OUTPATIENT
Start: 2021-12-07 | End: 2021-12-07

## 2021-12-07 RX ADMIN — TESTOSTERONE CYPIONATE 200 MG: 200 INJECTION INTRAMUSCULAR at 13:21

## 2021-12-21 ENCOUNTER — NURSE ONLY (OUTPATIENT)
Dept: FAMILY MEDICINE CLINIC | Age: 41
End: 2021-12-21
Payer: MEDICARE

## 2021-12-21 DIAGNOSIS — R79.89 LOW SERUM TESTOSTERONE: Primary | ICD-10-CM

## 2021-12-21 PROCEDURE — 96372 THER/PROPH/DIAG INJ SC/IM: CPT | Performed by: NURSE PRACTITIONER

## 2021-12-21 RX ORDER — TESTOSTERONE CYPIONATE 200 MG/ML
200 INJECTION INTRAMUSCULAR ONCE
Status: COMPLETED | OUTPATIENT
Start: 2021-12-21 | End: 2021-12-21

## 2021-12-21 RX ADMIN — TESTOSTERONE CYPIONATE 200 MG: 200 INJECTION INTRAMUSCULAR at 13:13

## 2022-01-04 ENCOUNTER — NURSE ONLY (OUTPATIENT)
Dept: FAMILY MEDICINE CLINIC | Age: 42
End: 2022-01-04
Payer: MEDICARE

## 2022-01-04 DIAGNOSIS — R79.89 LOW TESTOSTERONE LEVEL IN MALE: Primary | ICD-10-CM

## 2022-01-04 PROCEDURE — 96372 THER/PROPH/DIAG INJ SC/IM: CPT | Performed by: NURSE PRACTITIONER

## 2022-01-04 RX ORDER — TESTOSTERONE CYPIONATE 200 MG/ML
200 INJECTION INTRAMUSCULAR ONCE
Status: COMPLETED | OUTPATIENT
Start: 2022-01-04 | End: 2022-01-04

## 2022-01-04 RX ADMIN — TESTOSTERONE CYPIONATE 200 MG: 200 INJECTION INTRAMUSCULAR at 11:46

## 2022-01-18 ENCOUNTER — NURSE ONLY (OUTPATIENT)
Dept: FAMILY MEDICINE CLINIC | Age: 42
End: 2022-01-18
Payer: MEDICARE

## 2022-01-18 DIAGNOSIS — R79.89 LOW TESTOSTERONE LEVEL IN MALE: Primary | ICD-10-CM

## 2022-01-18 PROCEDURE — 96372 THER/PROPH/DIAG INJ SC/IM: CPT | Performed by: NURSE PRACTITIONER

## 2022-01-18 RX ORDER — TESTOSTERONE CYPIONATE 200 MG/ML
200 INJECTION INTRAMUSCULAR ONCE
Status: COMPLETED | OUTPATIENT
Start: 2022-01-18 | End: 2022-01-18

## 2022-01-18 RX ADMIN — TESTOSTERONE CYPIONATE 200 MG: 200 INJECTION INTRAMUSCULAR at 12:28

## 2022-01-18 NOTE — PROGRESS NOTES
After obtaining consent, and per orders of Severiano Valle, injection of Testosteron given in Left upper quad. gluteus by German Hubbard. Patient instructed to remain in clinic for 20 minutes afterwards, and to report any adverse reaction to me immediately.

## 2022-02-01 ENCOUNTER — NURSE ONLY (OUTPATIENT)
Dept: FAMILY MEDICINE CLINIC | Age: 42
End: 2022-02-01
Payer: MEDICARE

## 2022-02-01 DIAGNOSIS — R79.89 LOW TESTOSTERONE LEVEL IN MALE: Primary | ICD-10-CM

## 2022-02-01 PROCEDURE — 96372 THER/PROPH/DIAG INJ SC/IM: CPT | Performed by: NURSE PRACTITIONER

## 2022-02-01 RX ORDER — TESTOSTERONE CYPIONATE 200 MG/ML
200 INJECTION INTRAMUSCULAR ONCE
Status: COMPLETED | OUTPATIENT
Start: 2022-02-01 | End: 2022-02-01

## 2022-02-01 RX ADMIN — TESTOSTERONE CYPIONATE 200 MG: 200 INJECTION INTRAMUSCULAR at 11:22

## 2022-02-15 ENCOUNTER — NURSE ONLY (OUTPATIENT)
Dept: FAMILY MEDICINE CLINIC | Age: 42
End: 2022-02-15
Payer: MEDICARE

## 2022-02-15 DIAGNOSIS — R79.89 LOW TESTOSTERONE LEVEL IN MALE: Primary | ICD-10-CM

## 2022-02-15 PROCEDURE — 96372 THER/PROPH/DIAG INJ SC/IM: CPT | Performed by: NURSE PRACTITIONER

## 2022-02-15 RX ORDER — TESTOSTERONE CYPIONATE 200 MG/ML
200 INJECTION INTRAMUSCULAR ONCE
Status: COMPLETED | OUTPATIENT
Start: 2022-02-15 | End: 2022-02-15

## 2022-02-15 RX ADMIN — TESTOSTERONE CYPIONATE 200 MG: 200 INJECTION INTRAMUSCULAR at 12:57

## 2022-02-21 ENCOUNTER — OFFICE VISIT (OUTPATIENT)
Dept: FAMILY MEDICINE CLINIC | Age: 42
End: 2022-02-21
Payer: MEDICARE

## 2022-02-21 VITALS
BODY MASS INDEX: 32.74 KG/M2 | TEMPERATURE: 97.7 F | DIASTOLIC BLOOD PRESSURE: 86 MMHG | HEIGHT: 73 IN | OXYGEN SATURATION: 97 % | HEART RATE: 95 BPM | SYSTOLIC BLOOD PRESSURE: 132 MMHG | WEIGHT: 247 LBS

## 2022-02-21 DIAGNOSIS — R79.89 LOW TESTOSTERONE LEVEL IN MALE: ICD-10-CM

## 2022-02-21 DIAGNOSIS — Z13.1 DIABETES MELLITUS SCREENING: ICD-10-CM

## 2022-02-21 DIAGNOSIS — M51.36 BULGING OF LUMBAR INTERVERTEBRAL DISC: ICD-10-CM

## 2022-02-21 DIAGNOSIS — Z13.220 LIPID SCREENING: ICD-10-CM

## 2022-02-21 DIAGNOSIS — G89.29 CHRONIC BILATERAL LOW BACK PAIN WITH LEFT-SIDED SCIATICA: ICD-10-CM

## 2022-02-21 DIAGNOSIS — M54.42 CHRONIC BILATERAL LOW BACK PAIN WITH LEFT-SIDED SCIATICA: ICD-10-CM

## 2022-02-21 DIAGNOSIS — M43.22 CERVICAL VERTEBRAL FUSION: ICD-10-CM

## 2022-02-21 DIAGNOSIS — M54.12 CERVICAL RADICULOPATHY: Primary | ICD-10-CM

## 2022-02-21 DIAGNOSIS — Z13.29 THYROID DISORDER SCREENING: ICD-10-CM

## 2022-02-21 PROCEDURE — 1036F TOBACCO NON-USER: CPT | Performed by: NURSE PRACTITIONER

## 2022-02-21 PROCEDURE — G8484 FLU IMMUNIZE NO ADMIN: HCPCS | Performed by: NURSE PRACTITIONER

## 2022-02-21 PROCEDURE — G8417 CALC BMI ABV UP PARAM F/U: HCPCS | Performed by: NURSE PRACTITIONER

## 2022-02-21 PROCEDURE — 99213 OFFICE O/P EST LOW 20 MIN: CPT | Performed by: NURSE PRACTITIONER

## 2022-02-21 PROCEDURE — G8427 DOCREV CUR MEDS BY ELIG CLIN: HCPCS | Performed by: NURSE PRACTITIONER

## 2022-02-21 RX ORDER — PREDNISONE 20 MG/1
20 TABLET ORAL DAILY
Qty: 15 TABLET | Refills: 0 | Status: SHIPPED | OUTPATIENT
Start: 2022-02-21 | End: 2022-03-29

## 2022-02-21 RX ORDER — HYDROCODONE BITARTRATE AND ACETAMINOPHEN 5; 325 MG/1; MG/1
1 TABLET ORAL EVERY 6 HOURS PRN
Qty: 12 TABLET | Refills: 0 | Status: SHIPPED | OUTPATIENT
Start: 2022-02-21 | End: 2022-02-24

## 2022-02-21 RX ORDER — TIZANIDINE 2 MG/1
2 TABLET ORAL EVERY 8 HOURS PRN
Qty: 60 TABLET | Refills: 0 | Status: SHIPPED | OUTPATIENT
Start: 2022-02-21

## 2022-02-21 ASSESSMENT — ENCOUNTER SYMPTOMS
EYE PAIN: 0
ABDOMINAL PAIN: 0
DIARRHEA: 0
NAUSEA: 0
VOMITING: 0
SHORTNESS OF BREATH: 0
BACK PAIN: 0
SINUS PAIN: 0
COUGH: 0
SORE THROAT: 0

## 2022-02-21 NOTE — PROGRESS NOTES
Visit Information    Have you changed or started any medications since your last visit including any over-the-counter medicines, vitamins, or herbal medicines? no   Are you having any side effects from any of your medications? -  no  Have you stopped taking any of your medications? Is so, why? -  no    Have you seen any other physician or provider since your last visit? No  Have you had any other diagnostic tests since your last visit? No  Have you been seen in the emergency room and/or had an admission to a hospital since we last saw you? No  Have you had your routine dental cleaning in the past 6 months? no    Have you activated your [email or web address] account? If not, what are your barriers?  Yes     Patient Care Team:  YAAHIRA Pena CNP as PCP - General (Certified Nurse Practitioner)  YAHAIRA Pena CNP as PCP - [facility] Provider  Amirah Kyle MD as Consulting Physician (Rheumatology)  Nancy Guthrie as Consulting Physician (Orthopedic Surgery)  Bennett Diaz MD as Consulting Physician (Gastroenterology)  Oscar Preston DPM (Inactive) (Podiatry)  Donnie Adhikari MD as Consulting Physician (Cardiology)  David Glaser MD as Consulting Physician (Urology)    Medical History Review  Past Medical, Family, and Social History reviewed and does contribute to the patient presenting condition    Health Maintenance   Topic Date Due    Hepatitis C screen  Never done    COVID-19 Vaccine (1) Never done    Lipid screen  08/07/2021    Flu vaccine (1) 09/01/2021    Depression Monitoring  03/11/2022    Potassium monitoring  04/05/2022    Creatinine monitoring  04/05/2022    DTaP/Tdap/Td vaccine (2 - Td or Tdap) 02/18/2023    Pneumococcal 0-64 years Vaccine (2 of 2 - PPSV23) 01/12/2045    HIV screen  Completed    Hepatitis A vaccine  Aged Out    Hepatitis B vaccine  Aged Out    Hib vaccine  Aged Out    Meningococcal (ACWY) vaccine  Aged Out    Varicella vaccine  Discontinued

## 2022-02-21 NOTE — PROGRESS NOTES
(ZANAFLEX) 2 MG tablet Take 1 tablet by mouth every 8 hours as needed (back pain) 60 tablet 0    predniSONE (DELTASONE) 20 MG tablet Take 1 tablet by mouth daily Take 3 tabs po x 2 days, 2 tabs po x 3 days, 1 tab po x 2 days, half tab po x 2 days 15 tablet 0    metoprolol tartrate (LOPRESSOR) 25 MG tablet TAKE 2 TABLETS  (50  MG) EVERY DAY 90 tablet 1    Testosterone Cypionate 200 MG/ML SOLN Inject 1 mL as directed every 14 days 5 mL 2    amitriptyline (ELAVIL) 10 MG tablet       Omega-3 Fatty Acids (FISH OIL) 1000 MG CAPS       celecoxib (CELEBREX) 200 MG capsule TAKE 1 CAPSULE DAILY 90 capsule 5    omeprazole (PRILOSEC) 20 MG delayed release capsule TAKE 1 CAPSULE DAILY 90 capsule 5    rosuvastatin (CRESTOR) 10 MG tablet TAKE 1 TABLET DAILY 90 tablet 5    escitalopram (LEXAPRO) 10 MG tablet TAKE 1 TABLET DAILY 90 tablet 5    gabapentin (NEURONTIN) 300 MG capsule TAKE 1 CAPSULE THREE TIMES A DAY (SEDATION WARNING) 270 capsule 5    montelukast (SINGULAIR) 10 MG tablet Take 1 tablet by mouth daily 90 tablet 5    SUMAtriptan (IMITREX) 100 MG tablet       PANCREATIN PO Take by mouth      buPROPion (WELLBUTRIN SR) 100 MG extended release tablet Take 100 mg by mouth 2 times daily       fluticasone (FLONASE) 50 MCG/ACT nasal spray 1 spray by Nasal route daily 2 sprays each nostril daily 3 Bottle 3    clobetasol (TEMOVATE) 0.05 % ointment Apply to rash twice daily (not face, armpit or groin) 30 g 2    valACYclovir (VALTREX) 1 g tablet take 2 tablets by mouth AT FIRST SIGN OF COLD SORE AND REPEAT IN 12 HOURS 12 tablet 3    aspirin-acetaminophen-caffeine (EXCEDRIN MIGRAINE) 250-250-65 MG per tablet Take 2 tablets by mouth daily as needed.  aspirin 81 MG EC tablet Take 81 mg by mouth daily. No current facility-administered medications for this visit. Allergies   Allergen Reactions    Shrimp Flavor Swelling     Hands swell up,  Positive allergy testing.      Zocor [Simvastatin] Other (See Comments)     Muscle pain / spasm       Subjective:      Review of Systems   Constitutional: Negative for chills and fatigue. HENT: Negative for congestion, ear pain, sinus pain and sore throat. Eyes: Negative for pain and visual disturbance. Respiratory: Negative for cough and shortness of breath. Cardiovascular: Negative for chest pain and palpitations. Gastrointestinal: Negative for abdominal pain, diarrhea, nausea and vomiting. Genitourinary: Negative for penile pain and testicular pain. Musculoskeletal: Positive for arthralgias, neck pain and neck stiffness. Negative for back pain and joint swelling. Skin: Negative for rash. Neurological: Positive for numbness. Negative for dizziness and light-headedness. Hematological: Does not bruise/bleed easily. All other systems reviewed and are negative.      :Objective     Physical Exam  Vitals and nursing note reviewed. Constitutional:       General: He is not in acute distress. Appearance: Normal appearance. He is not toxic-appearing. Cardiovascular:      Rate and Rhythm: Normal rate. Pulmonary:      Effort: Pulmonary effort is normal.      Breath sounds: Normal breath sounds. Musculoskeletal:      Cervical back: Spasms and tenderness present. Back:    Skin:     General: Skin is warm and dry. Neurological:      General: No focal deficit present. Mental Status: He is alert and oriented to person, place, and time. /86 (Site: Right Upper Arm, Position: Sitting, Cuff Size: Large Adult)   Pulse 95   Temp 97.7 °F (36.5 °C) (Tympanic)   Ht 6' 1\" (1.854 m)   Wt 247 lb (112 kg)   SpO2 97%   BMI 32.59 kg/m²     Lab Review   No visits with results within 6 Month(s) from this visit. Latest known visit with results is:   Hospital Outpatient Visit on 05/20/2021   Component Date Value    Testosterone 05/20/2021 526        Assessment and Plan      1.  Cervical radiculopathy  -     XR SHOULDER LEFT (MIN 2 VIEWS); Future  -     MRI CERVICAL SPINE WO CONTRAST; Future  -     HYDROcodone-acetaminophen (NORCO) 5-325 MG per tablet; Take 1 tablet by mouth every 6 hours as needed for Pain for up to 3 days. Intended supply: 3 days. Take lowest dose possible to manage pain, Disp-12 tablet, R-0Normal  -     tiZANidine (ZANAFLEX) 2 MG tablet; Take 1 tablet by mouth every 8 hours as needed (back pain), Disp-60 tablet, R-0Normal  -     predniSONE (DELTASONE) 20 MG tablet; Take 1 tablet by mouth daily Take 3 tabs po x 2 days, 2 tabs po x 3 days, 1 tab po x 2 days, half tab po x 2 days, Disp-15 tablet, R-0Normal  -     CBC with Auto Differential; Future  2. Cervical vertebral fusion  -     XR SHOULDER LEFT (MIN 2 VIEWS); Future  -     MRI CERVICAL SPINE WO CONTRAST; Future  -     HYDROcodone-acetaminophen (NORCO) 5-325 MG per tablet; Take 1 tablet by mouth every 6 hours as needed for Pain for up to 3 days. Intended supply: 3 days. Take lowest dose possible to manage pain, Disp-12 tablet, R-0Normal  -     tiZANidine (ZANAFLEX) 2 MG tablet; Take 1 tablet by mouth every 8 hours as needed (back pain), Disp-60 tablet, R-0Normal  -     predniSONE (DELTASONE) 20 MG tablet; Take 1 tablet by mouth daily Take 3 tabs po x 2 days, 2 tabs po x 3 days, 1 tab po x 2 days, half tab po x 2 days, Disp-15 tablet, R-0Normal  -     CBC with Auto Differential; Future  3. Chronic bilateral low back pain with left-sided sciatica  -     XR SHOULDER LEFT (MIN 2 VIEWS); Future  -     MRI CERVICAL SPINE WO CONTRAST; Future  -     HYDROcodone-acetaminophen (NORCO) 5-325 MG per tablet; Take 1 tablet by mouth every 6 hours as needed for Pain for up to 3 days. Intended supply: 3 days. Take lowest dose possible to manage pain, Disp-12 tablet, R-0Normal  -     tiZANidine (ZANAFLEX) 2 MG tablet; Take 1 tablet by mouth every 8 hours as needed (back pain), Disp-60 tablet, R-0Normal  -     predniSONE (DELTASONE) 20 MG tablet;  Take 1 tablet by mouth daily Take 3 tabs po x 2 days, 2 tabs po x 3 days, 1 tab po x 2 days, half tab po x 2 days, Disp-15 tablet, R-0Normal  -     CBC with Auto Differential; Future  4. Low testosterone level in male  -     CBC with Auto Differential; Future  -     PSA Screening; Future  -     Testosterone; Future  5. Lipid screening  -     CBC with Auto Differential; Future  -     Lipid Panel; Future  6. Bulging of lumbar intervertebral disc  -     CBC with Auto Differential; Future  7. Thyroid disorder screening  -     CBC with Auto Differential; Future  -     TSH With Reflex Ft4; Future  8. Diabetes mellitus screening  -     CBC with Auto Differential; Future  -     Comprehensive Metabolic Panel; Future         Routine labs ordered  We will evaluate MRI cervical spine and x-rays left shoulder  Continue gabapentin, Zanaflex, will provide short course of Norco, prednisone  pending results possible referral to neurosurgery      No results found for this visit on 02/21/22. Return if symptoms worsen or fail to improve. Orders Placed This Encounter   Medications    HYDROcodone-acetaminophen (NORCO) 5-325 MG per tablet     Sig: Take 1 tablet by mouth every 6 hours as needed for Pain for up to 3 days. Intended supply: 3 days. Take lowest dose possible to manage pain     Dispense:  12 tablet     Refill:  0     Reduce doses taken as pain becomes manageable    tiZANidine (ZANAFLEX) 2 MG tablet     Sig: Take 1 tablet by mouth every 8 hours as needed (back pain)     Dispense:  60 tablet     Refill:  0    predniSONE (DELTASONE) 20 MG tablet     Sig: Take 1 tablet by mouth daily Take 3 tabs po x 2 days, 2 tabs po x 3 days, 1 tab po x 2 days, half tab po x 2 days     Dispense:  15 tablet     Refill:  0        Patient given educational materials - see patient instructions. Discussed use, benefit, and side effects of prescribed medications. All patientquestions answered. Pt voiced understanding.     Patient given educational materials - see patient instructions. Discussed use, benefit, and side effects of prescribed medications. All patientquestions answered. Pt voiced understanding. This note was transcribed using dictation with Dragon services. Efforts were made to correct any errors but some words may be misinterpreted.     Patient assumes risks associated with failure to complete recommended testing and treatments in a timely manner    Electronically signed by YAHAIRA Yu CNP on 2/21/2022at 2:53 PM

## 2022-02-21 NOTE — PATIENT INSTRUCTIONS
Patient Education        Pinched Nerve in the Neck: Care Instructions  Your Care Instructions  A pinched nerve in the neck happens when a vertebra or disc in the upper part of your spine is damaged. This damage can happen because of an injury. Or it can just happen with age. The changes caused by the damage may put pressure on a nearby nerve root, pinching it. This causes symptoms such as sharp pain in your neck, shoulder, arm, hand, or back. You may also have tingling or numbness. Sometimes it makes your arm weaker. The symptoms are usually worse when you turn your head or strain your neck. For many people, the symptoms get better over time and finally go away. Early treatment usually includes medicines for pain and swelling. Sometimes physical therapy and special exercises may help. Follow-up care is a key part of your treatment and safety. Be sure to make and go to all appointments, and call your doctor if you are having problems. It's also a good idea to know your test results and keep a list of the medicines you take. How can you care for yourself at home? · Be safe with medicines. Read and follow all instructions on the label. ? If the doctor gave you a prescription medicine for pain, take it as prescribed. ? If you are not taking a prescription pain medicine, ask your doctor if you can take an over-the-counter medicine. · Try using a heating pad on a low or medium setting for 15 to 20 minutes every 2 or 3 hours. Try a warm shower in place of one session with the heating pad. You can also buy single-use heat wraps that last up to 8 hours. · You can also try an ice pack for 10 to 15 minutes every 2 to 3 hours. There isn't strong evidence that either heat or ice will help. But you can try them to see if they help you. · Don't spend too long in one position. Take short breaks to move around and change positions. · Wear a seat belt and shoulder harness when you are in a car.   · Sleep with a pillow under your head and neck that keeps your neck straight. · If you were given a neck brace (cervical collar) to limit neck motion, wear it as instructed for as many days as your doctor tells you to. Do not wear it longer than you were told to. Wearing a brace for too long can lead to neck stiffness and can weaken the neck muscles. · Follow your doctor's instructions for gentle neck-stretching exercises. · Do not smoke. Smoking can slow healing of your discs. If you need help quitting, talk to your doctor about stop-smoking programs and medicines. These can increase your chances of quitting for good. · Avoid strenuous work or exercise until your doctor says it is okay. When should you call for help? Call 911 anytime you think you may need emergency care. For example, call if:    · You are unable to move an arm or a leg at all. Call your doctor now or seek immediate medical care if:    · You have new or worse symptoms in your arms, legs, chest, belly, or buttocks. Symptoms may include:  ? Numbness or tingling. ? Weakness. ? Pain.     · You lose bladder or bowel control. Watch closely for changes in your health, and be sure to contact your doctor if:    · You are not getting better as expected. Where can you learn more? Go to [email or web address] org and sign in to your [employer] account. Enter B106 in the [facility] box to learn more about \"Pinched Nerve in the Neck: Care Instructions. \"     If you do not have an account, please click on the \"Sign Up Now\" link. Current as of: July 1, 2021               Content Version: 13.1  © 6549-5180 [employer], [employer]. Care instructions adapted under license by [facility] ([facility]). If you have questions about a medical condition or this instruction, always ask your healthcare professional. [place] [address] any warranty or liability for your use of this information.

## 2022-02-23 ENCOUNTER — HOSPITAL ENCOUNTER (OUTPATIENT)
Age: 42
Discharge: HOME | End: 2022-02-25
Payer: MEDICARE

## 2022-02-23 ENCOUNTER — HOSPITAL ENCOUNTER (OUTPATIENT)
Dept: GENERAL RADIOLOGY | Age: 42
Discharge: HOME | End: 2022-02-25
Payer: MEDICARE

## 2022-02-23 DIAGNOSIS — M43.22 CERVICAL VERTEBRAL FUSION: ICD-10-CM

## 2022-02-23 DIAGNOSIS — M54.42 CHRONIC BILATERAL LOW BACK PAIN WITH LEFT-SIDED SCIATICA: ICD-10-CM

## 2022-02-23 DIAGNOSIS — M54.12 CERVICAL RADICULOPATHY: ICD-10-CM

## 2022-02-23 DIAGNOSIS — G89.29 CHRONIC BILATERAL LOW BACK PAIN WITH LEFT-SIDED SCIATICA: ICD-10-CM

## 2022-02-23 PROCEDURE — 73030 X-RAY EXAM OF SHOULDER: CPT

## 2022-03-01 ENCOUNTER — NURSE ONLY (OUTPATIENT)
Dept: FAMILY MEDICINE CLINIC | Age: 42
End: 2022-03-01
Payer: MEDICARE

## 2022-03-01 DIAGNOSIS — R79.89 LOW TESTOSTERONE LEVEL IN MALE: Primary | ICD-10-CM

## 2022-03-01 PROCEDURE — 96372 THER/PROPH/DIAG INJ SC/IM: CPT | Performed by: NURSE PRACTITIONER

## 2022-03-01 RX ORDER — TESTOSTERONE CYPIONATE 200 MG/ML
200 INJECTION INTRAMUSCULAR ONCE
Status: COMPLETED | OUTPATIENT
Start: 2022-03-01 | End: 2022-03-01

## 2022-03-01 RX ADMIN — TESTOSTERONE CYPIONATE 200 MG: 200 INJECTION INTRAMUSCULAR at 12:35

## 2022-03-01 NOTE — PROGRESS NOTES
After obtaining consent, and per orders of Doug Hernandez CNP, injection of Testosterone given in Right upper quad. gluteus by Cruz Warren. Patient instructed to remain in clinic for 20 minutes afterwards, and to report any adverse reaction to me immediately.

## 2022-03-03 ENCOUNTER — HOSPITAL ENCOUNTER (OUTPATIENT)
Dept: MRI IMAGING | Age: 42
Discharge: HOME | End: 2022-03-05
Payer: MEDICARE

## 2022-03-03 DIAGNOSIS — M43.22 CERVICAL VERTEBRAL FUSION: ICD-10-CM

## 2022-03-03 DIAGNOSIS — M54.12 CERVICAL RADICULOPATHY: ICD-10-CM

## 2022-03-03 DIAGNOSIS — G89.29 CHRONIC BILATERAL LOW BACK PAIN WITH LEFT-SIDED SCIATICA: ICD-10-CM

## 2022-03-03 DIAGNOSIS — M54.42 CHRONIC BILATERAL LOW BACK PAIN WITH LEFT-SIDED SCIATICA: ICD-10-CM

## 2022-03-03 PROCEDURE — G1010 CDSM STANSON: HCPCS

## 2022-03-09 ENCOUNTER — HOSPITAL ENCOUNTER (OUTPATIENT)
Age: 42
Setting detail: SPECIMEN
Discharge: HOME | End: 2022-03-09

## 2022-03-09 DIAGNOSIS — Z13.29 THYROID DISORDER SCREENING: ICD-10-CM

## 2022-03-09 DIAGNOSIS — Z13.220 LIPID SCREENING: ICD-10-CM

## 2022-03-09 DIAGNOSIS — M54.42 CHRONIC BILATERAL LOW BACK PAIN WITH LEFT-SIDED SCIATICA: ICD-10-CM

## 2022-03-09 DIAGNOSIS — M43.22 CERVICAL VERTEBRAL FUSION: ICD-10-CM

## 2022-03-09 DIAGNOSIS — G89.29 CHRONIC BILATERAL LOW BACK PAIN WITH LEFT-SIDED SCIATICA: ICD-10-CM

## 2022-03-09 DIAGNOSIS — M54.12 CERVICAL RADICULOPATHY: ICD-10-CM

## 2022-03-09 DIAGNOSIS — Z13.1 DIABETES MELLITUS SCREENING: ICD-10-CM

## 2022-03-09 DIAGNOSIS — R79.89 LOW TESTOSTERONE LEVEL IN MALE: ICD-10-CM

## 2022-03-09 DIAGNOSIS — M51.36 BULGING OF LUMBAR INTERVERTEBRAL DISC: ICD-10-CM

## 2022-03-09 LAB
ABSOLUTE EOS #: 0.13 K/UL (ref 0–0.44)
ABSOLUTE IMMATURE GRANULOCYTE: 0.14 K/UL (ref 0–0.3)
ABSOLUTE LYMPH #: 3.11 K/UL (ref 1.1–3.7)
ABSOLUTE MONO #: 0.93 K/UL (ref 0.1–1.2)
ALBUMIN SERPL-MCNC: 4.5 G/DL (ref 3.5–5.2)
ALBUMIN/GLOBULIN RATIO: 2 (ref 1–2.5)
ALP BLD-CCNC: 61 U/L (ref 40–129)
ALT SERPL-CCNC: 25 U/L (ref 5–41)
ANION GAP SERPL CALCULATED.3IONS-SCNC: 14 MMOL/L (ref 9–17)
AST SERPL-CCNC: 16 U/L
BASOPHILS # BLD: 1 % (ref 0–2)
BASOPHILS ABSOLUTE: 0.12 K/UL (ref 0–0.2)
BILIRUB SERPL-MCNC: 0.59 MG/DL (ref 0.3–1.2)
BUN BLDV-MCNC: 15 MG/DL (ref 6–20)
CALCIUM SERPL-MCNC: 9.3 MG/DL (ref 8.6–10.4)
CHLORIDE BLD-SCNC: 103 MMOL/L (ref 98–107)
CHOLESTEROL/HDL RATIO: 4.7
CHOLESTEROL: 165 MG/DL
CO2: 27 MMOL/L (ref 20–31)
CREAT SERPL-MCNC: 0.92 MG/DL (ref 0.7–1.2)
EOSINOPHILS RELATIVE PERCENT: 1 % (ref 1–4)
GFR AFRICAN AMERICAN: >60 ML/MIN
GFR NON-AFRICAN AMERICAN: >60 ML/MIN
GFR SERPL CREATININE-BSD FRML MDRD: NORMAL ML/MIN/{1.73_M2}
GLUCOSE BLD-MCNC: 90 MG/DL (ref 70–99)
HCT VFR BLD CALC: 58.7 % (ref 40.7–50.3)
HDLC SERPL-MCNC: 35 MG/DL
HEMOGLOBIN: 19.2 G/DL (ref 13–17)
IMMATURE GRANULOCYTES: 1 %
LDL CHOLESTEROL: 61 MG/DL (ref 0–130)
LYMPHOCYTES # BLD: 27 % (ref 24–43)
MCH RBC QN AUTO: 30.2 PG (ref 25.2–33.5)
MCHC RBC AUTO-ENTMCNC: 32.7 G/DL (ref 28.4–34.8)
MCV RBC AUTO: 92.4 FL (ref 82.6–102.9)
MONOCYTES # BLD: 8 % (ref 3–12)
NRBC AUTOMATED: 0 PER 100 WBC
PDW BLD-RTO: 13.4 % (ref 11.8–14.4)
PLATELET # BLD: 196 K/UL (ref 138–453)
PMV BLD AUTO: 11.1 FL (ref 8.1–13.5)
POTASSIUM SERPL-SCNC: 4.1 MMOL/L (ref 3.7–5.3)
PROSTATE SPECIFIC ANTIGEN: 1.02 UG/L
RBC # BLD: 6.35 M/UL (ref 4.21–5.77)
SEG NEUTROPHILS: 62 % (ref 36–65)
SEGMENTED NEUTROPHILS ABSOLUTE COUNT: 7.26 K/UL (ref 1.5–8.1)
SODIUM BLD-SCNC: 144 MMOL/L (ref 135–144)
TESTOSTERONE TOTAL: 424 NG/DL (ref 220–1000)
TOTAL PROTEIN: 6.7 G/DL (ref 6.4–8.3)
TRIGL SERPL-MCNC: 345 MG/DL
TSH SERPL DL<=0.05 MIU/L-ACNC: 1.42 MIU/L (ref 0.3–5)
WBC # BLD: 11.7 K/UL (ref 3.5–11.3)

## 2022-03-11 DIAGNOSIS — E78.5 HYPERLIPIDEMIA, UNSPECIFIED HYPERLIPIDEMIA TYPE: Primary | ICD-10-CM

## 2022-03-11 RX ORDER — ROSUVASTATIN CALCIUM 20 MG/1
20 TABLET, COATED ORAL DAILY
Qty: 90 TABLET | Refills: 1 | Status: SHIPPED | OUTPATIENT
Start: 2022-03-11 | End: 2022-09-29

## 2022-03-15 ENCOUNTER — NURSE ONLY (OUTPATIENT)
Dept: FAMILY MEDICINE CLINIC | Age: 42
End: 2022-03-15
Payer: MEDICARE

## 2022-03-15 VITALS — DIASTOLIC BLOOD PRESSURE: 86 MMHG | SYSTOLIC BLOOD PRESSURE: 120 MMHG | TEMPERATURE: 97.5 F

## 2022-03-15 DIAGNOSIS — R79.89 LOW TESTOSTERONE LEVEL IN MALE: Primary | ICD-10-CM

## 2022-03-15 PROCEDURE — 96372 THER/PROPH/DIAG INJ SC/IM: CPT | Performed by: NURSE PRACTITIONER

## 2022-03-15 RX ORDER — TESTOSTERONE CYPIONATE 200 MG/ML
200 INJECTION INTRAMUSCULAR ONCE
Status: COMPLETED | OUTPATIENT
Start: 2022-03-15 | End: 2022-03-15

## 2022-03-15 RX ADMIN — TESTOSTERONE CYPIONATE 200 MG: 200 INJECTION INTRAMUSCULAR at 11:06

## 2022-03-15 NOTE — PROGRESS NOTES
After obtaining consent, and per orders of Luis Alberto ballesteros, injection of Testosteron given in Left upper quad. gluteus by Amisha Vazquez. Patient instructed to remain in clinic for 20 minutes afterwards, and to report any adverse reaction to me immediately.

## 2022-03-29 ENCOUNTER — NURSE ONLY (OUTPATIENT)
Dept: FAMILY MEDICINE CLINIC | Age: 42
End: 2022-03-29
Payer: MEDICARE

## 2022-03-29 VITALS
BODY MASS INDEX: 32.74 KG/M2 | DIASTOLIC BLOOD PRESSURE: 70 MMHG | TEMPERATURE: 98.7 F | OXYGEN SATURATION: 98 % | HEART RATE: 80 BPM | SYSTOLIC BLOOD PRESSURE: 138 MMHG | WEIGHT: 247 LBS | HEIGHT: 73 IN

## 2022-03-29 DIAGNOSIS — J02.0 ACUTE STREPTOCOCCAL PHARYNGITIS: Primary | ICD-10-CM

## 2022-03-29 DIAGNOSIS — R79.89 LOW TESTOSTERONE LEVEL IN MALE: ICD-10-CM

## 2022-03-29 DIAGNOSIS — J02.9 SORE THROAT: ICD-10-CM

## 2022-03-29 DIAGNOSIS — R06.2 WHEEZING: ICD-10-CM

## 2022-03-29 DIAGNOSIS — R05.9 COUGH: ICD-10-CM

## 2022-03-29 LAB — S PYO AG THROAT QL: POSITIVE

## 2022-03-29 PROCEDURE — 96372 THER/PROPH/DIAG INJ SC/IM: CPT | Performed by: NURSE PRACTITIONER

## 2022-03-29 PROCEDURE — 99213 OFFICE O/P EST LOW 20 MIN: CPT | Performed by: NURSE PRACTITIONER

## 2022-03-29 PROCEDURE — 87880 STREP A ASSAY W/OPTIC: CPT | Performed by: NURSE PRACTITIONER

## 2022-03-29 RX ORDER — ALBUTEROL SULFATE 90 UG/1
2 AEROSOL, METERED RESPIRATORY (INHALATION) EVERY 6 HOURS PRN
Qty: 18 G | Refills: 0 | Status: SHIPPED | OUTPATIENT
Start: 2022-03-29

## 2022-03-29 RX ORDER — PREDNISONE 20 MG/1
40 TABLET ORAL DAILY
Qty: 10 TABLET | Refills: 0 | Status: SHIPPED | OUTPATIENT
Start: 2022-03-29 | End: 2022-04-03

## 2022-03-29 RX ORDER — AMOXICILLIN 875 MG/1
875 TABLET, COATED ORAL 2 TIMES DAILY
Qty: 20 TABLET | Refills: 0 | Status: SHIPPED | OUTPATIENT
Start: 2022-03-29 | End: 2022-04-08

## 2022-03-29 RX ORDER — TESTOSTERONE CYPIONATE 200 MG/ML
200 INJECTION INTRAMUSCULAR ONCE
Status: COMPLETED | OUTPATIENT
Start: 2022-03-29 | End: 2022-03-29

## 2022-03-29 RX ADMIN — TESTOSTERONE CYPIONATE 200 MG: 200 INJECTION INTRAMUSCULAR at 15:04

## 2022-03-29 SDOH — ECONOMIC STABILITY: FOOD INSECURITY: WITHIN THE PAST 12 MONTHS, YOU WORRIED THAT YOUR FOOD WOULD RUN OUT BEFORE YOU GOT MONEY TO BUY MORE.: NEVER TRUE

## 2022-03-29 SDOH — ECONOMIC STABILITY: FOOD INSECURITY: WITHIN THE PAST 12 MONTHS, THE FOOD YOU BOUGHT JUST DIDN'T LAST AND YOU DIDN'T HAVE MONEY TO GET MORE.: NEVER TRUE

## 2022-03-29 ASSESSMENT — PATIENT HEALTH QUESTIONNAIRE - PHQ9
2. FEELING DOWN, DEPRESSED OR HOPELESS: 0
7. TROUBLE CONCENTRATING ON THINGS, SUCH AS READING THE NEWSPAPER OR WATCHING TELEVISION: 0
6. FEELING BAD ABOUT YOURSELF - OR THAT YOU ARE A FAILURE OR HAVE LET YOURSELF OR YOUR FAMILY DOWN: 0
8. MOVING OR SPEAKING SO SLOWLY THAT OTHER PEOPLE COULD HAVE NOTICED. OR THE OPPOSITE, BEING SO FIGETY OR RESTLESS THAT YOU HAVE BEEN MOVING AROUND A LOT MORE THAN USUAL: 0
SUM OF ALL RESPONSES TO PHQ9 QUESTIONS 1 & 2: 0
SUM OF ALL RESPONSES TO PHQ QUESTIONS 1-9: 0
5. POOR APPETITE OR OVEREATING: 0
SUM OF ALL RESPONSES TO PHQ QUESTIONS 1-9: 0
9. THOUGHTS THAT YOU WOULD BE BETTER OFF DEAD, OR OF HURTING YOURSELF: 0
SUM OF ALL RESPONSES TO PHQ QUESTIONS 1-9: 0
10. IF YOU CHECKED OFF ANY PROBLEMS, HOW DIFFICULT HAVE THESE PROBLEMS MADE IT FOR YOU TO DO YOUR WORK, TAKE CARE OF THINGS AT HOME, OR GET ALONG WITH OTHER PEOPLE: 0
1. LITTLE INTEREST OR PLEASURE IN DOING THINGS: 0
4. FEELING TIRED OR HAVING LITTLE ENERGY: 0
SUM OF ALL RESPONSES TO PHQ QUESTIONS 1-9: 0
3. TROUBLE FALLING OR STAYING ASLEEP: 0

## 2022-03-29 ASSESSMENT — ENCOUNTER SYMPTOMS
SINUS PAIN: 0
SHORTNESS OF BREATH: 0
VOMITING: 0
COUGH: 1
SORE THROAT: 1
EYE PAIN: 0
RHINORRHEA: 1
DIARRHEA: 0
SINUS PRESSURE: 1
WHEEZING: 1
NAUSEA: 0
ABDOMINAL PAIN: 0
BACK PAIN: 0

## 2022-03-29 ASSESSMENT — SOCIAL DETERMINANTS OF HEALTH (SDOH): HOW HARD IS IT FOR YOU TO PAY FOR THE VERY BASICS LIKE FOOD, HOUSING, MEDICAL CARE, AND HEATING?: NOT HARD AT ALL

## 2022-03-29 NOTE — PROGRESS NOTES
After obtaining consent, and per orders of Esau Blum, injection of Testosterone given in Right upper quad. gluteus by Ingrid Dhaliwal. Patient instructed to remain in clinic for 20 minutes afterwards, and to report any adverse reaction to me immediately. Pt tolerated injection well.

## 2022-03-29 NOTE — PROGRESS NOTES
Visit Information    Have you changed or started any medications since your last visit including any over-the-counter medicines, vitamins, or herbal medicines? no   Are you having any side effects from any of your medications? -  no  Have you stopped taking any of your medications? Is so, why? -  no    Have you seen any other physician or provider since your last visit? No  Have you had any other diagnostic tests since your last visit? No  Have you been seen in the emergency room and/or had an admission to a hospital since we last saw you? No  Have you had your routine dental cleaning in the past 6 months? no    Have you activated your [employer] account? If not, what are your barriers?  Yes     Patient Care Team:  YAHAIRA Haque CNP as PCP - General (Certified Nurse Practitioner)  YAHAIRA Haque CNP as PCP - [facility]  Gladis Raines MD as Consulting Physician (Rheumatology)  Umu Benitez as Consulting Physician (Orthopedic Surgery)  Deborah Jutsin MD as Consulting Physician (Gastroenterology)  Shahzad Peterson DPM (Inactive) (Podiatry)  Jerilyn Rust MD as Consulting Physician (Cardiology)  Porfirio Colin MD as Consulting Physician (Urology)    Medical History Review  Past Medical, Family, and Social History reviewed and does contribute to the patient presenting condition    Health Maintenance   Topic Date Due    Hepatitis C screen  Never done    COVID-19 Vaccine (1) Never done    Depression Monitoring  Never done    Flu vaccine (1) 09/01/2021    DTaP/Tdap/Td vaccine (2 - Td or Tdap) 02/18/2023    Lipid screen  03/09/2023    Potassium monitoring  03/09/2023    Creatinine monitoring  03/09/2023    Pneumococcal 0-64 years Vaccine (2 of 2 - PPSV23) 01/12/2045    HIV screen  Completed    Hepatitis A vaccine  Aged Out    Hepatitis B vaccine  Aged Out    Hib vaccine  Aged Out    Meningococcal (ACWY) vaccine  Aged Out    Varicella vaccine  Discontinued

## 2022-03-29 NOTE — PROGRESS NOTES
[address]  [address]  [address] [facility] [address]  Dept: [phone]  Dept Fax: [phone]    Lenon Dubin is a 43 y.o. male who presents today for his medicalconditions/complaints as noted below. Lenon Dubin is c/o of Chills (pt is here for chills, flushed feeling, congestion, cough, sore throat,sinus pressure, fatigue x 6 days )      HPI:       42-year-old male patient presents with sore throat cough congestion. Symptoms began approximately 1 week ago. Describes fevers chills nasal congestion, scratchy sore throat, sinus pressure maxillary region and cough. Reports he does feel some wheezes at times. Denies vomiting or diarrhea. 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 Treatments tried mucinex dm.       Past Medical History:   Diagnosis Date    Chronic back pain     DDD (degenerative disc disease)     Depression 7/17/2017    Depression with anxiety     DJD (degenerative joint disease)     knees, ankles, spine    GERD (gastroesophageal reflux disease)     GERD with stricture 3/18/2015    Hyperlipidemia, mixed     Internal derangement of shoulder 5/21/2013    Left nephrolithiasis 5/5/2017    Left shoulder strain 5/21/2013    Obesity (BMI 30-39.9) 6/27/2019    Radiculopathy     cervical, and lumbar        Current Outpatient Medications   Medication Sig Dispense Refill    amoxicillin (AMOXIL) 875 MG tablet Take 1 tablet by mouth 2 times daily for 10 days 20 tablet 0    predniSONE (DELTASONE) 20 MG tablet Take 2 tablets by mouth daily for 5 days 10 tablet 0    albuterol sulfate  (90 Base) MCG/ACT inhaler Inhale 2 puffs into the lungs every 6 hours as needed for Wheezing 18 g 0    rosuvastatin (CRESTOR) 20 MG tablet Take 1 tablet by mouth daily 90 tablet 1    tiZANidine (ZANAFLEX) 2 MG tablet Take 1 tablet by mouth every 8 hours as needed (back pain) 60 tablet 0    metoprolol tartrate (LOPRESSOR) 25 MG tablet TAKE 2 TABLETS  (50  MG) EVERY DAY 90 tablet 1    Testosterone Cypionate 200 MG/ML SOLN Inject 1 mL as directed every 14 days 5 mL 2    amitriptyline (ELAVIL) 10 MG tablet       Omega-3 Fatty Acids (FISH OIL) 1000 MG CAPS       celecoxib (CELEBREX) 200 MG capsule TAKE 1 CAPSULE DAILY 90 capsule 5    omeprazole (PRILOSEC) 20 MG delayed release capsule TAKE 1 CAPSULE DAILY 90 capsule 5    escitalopram (LEXAPRO) 10 MG tablet TAKE 1 TABLET DAILY 90 tablet 5    montelukast (SINGULAIR) 10 MG tablet Take 1 tablet by mouth daily 90 tablet 5    SUMAtriptan (IMITREX) 100 MG tablet       PANCREATIN PO Take by mouth      buPROPion (WELLBUTRIN SR) 100 MG extended release tablet Take 100 mg by mouth 2 times daily       fluticasone (FLONASE) 50 MCG/ACT nasal spray 1 spray by Nasal route daily 2 sprays each nostril daily 3 Bottle 3    clobetasol (TEMOVATE) 0.05 % ointment Apply to rash twice daily (not face, armpit or groin) 30 g 2    valACYclovir (VALTREX) 1 g tablet take 2 tablets by mouth AT FIRST SIGN OF COLD SORE AND REPEAT IN 12 HOURS 12 tablet 3    aspirin-acetaminophen-caffeine (EXCEDRIN MIGRAINE) 250-250-65 MG per tablet Take 2 tablets by mouth daily as needed.  aspirin 81 MG EC tablet Take 81 mg by mouth daily.  gabapentin (NEURONTIN) 300 MG capsule TAKE 1 CAPSULE THREE TIMES A DAY (SEDATION WARNING) 270 capsule 5     No current facility-administered medications for this visit. Allergies   Allergen Reactions    Shrimp Flavor Swelling     Hands swell up,  Positive allergy testing.  Zocor [Simvastatin] Other (See Comments)     Muscle pain / spasm       Subjective:      Review of Systems   Constitutional: Positive for chills, fatigue and fever. HENT: Positive for congestion, rhinorrhea, sinus pressure and sore throat. Negative for ear pain and sinus pain. Eyes: Negative for pain and visual disturbance. Respiratory: Positive for cough and wheezing.  Negative for shortness of breath. Cardiovascular: Negative for chest pain and palpitations. Gastrointestinal: Negative for abdominal pain, diarrhea, nausea and vomiting. Genitourinary: Negative for penile pain and testicular pain. Musculoskeletal: Negative for back pain, joint swelling and neck pain. Skin: Negative for rash. Neurological: Negative for dizziness and light-headedness. Hematological: Does not bruise/bleed easily. All other systems reviewed and are negative.      :Objective     Physical Exam  Vitals and nursing note reviewed. Constitutional:       General: He is not in acute distress. Appearance: Normal appearance. He is not toxic-appearing. HENT:      Right Ear: Tympanic membrane normal.      Left Ear: Tympanic membrane normal.      Nose: Congestion and rhinorrhea present. Mouth/Throat:      Mouth: Mucous membranes are moist.      Pharynx: Oropharyngeal exudate and posterior oropharyngeal erythema present. Cardiovascular:      Rate and Rhythm: Normal rate. Pulmonary:      Effort: Pulmonary effort is normal. No respiratory distress. Breath sounds: Wheezing present. Skin:     General: Skin is warm and dry. Neurological:      General: No focal deficit present. Mental Status: He is alert and oriented to person, place, and time.        /70 (Site: Right Upper Arm, Position: Sitting, Cuff Size: Large Adult)   Pulse 80   Temp 98.7 °F (37.1 °C) (Tympanic)   Ht 6' 1\" (1.854 m)   Wt 247 lb (112 kg)   SpO2 98%   BMI 32.59 kg/m²     Lab Review   Hospital Outpatient Visit on 03/09/2022   Component Date Value    WBC 03/09/2022 11.7*    RBC 03/09/2022 6.35*    Hemoglobin 03/09/2022 19.2*    Hematocrit 03/09/2022 58.7*    MCV 03/09/2022 92.4     MCH 03/09/2022 30.2     MCHC 03/09/2022 32.7     RDW 03/09/2022 13.4     Platelets 62/24/1064 196     MPV 03/09/2022 11.1     NRBC Automated 03/09/2022 0.0     Seg Neutrophils 03/09/2022 62     Lymphocytes 03/09/2022 27     Monocytes 03/09/2022 8     Eosinophils % 03/09/2022 1     Basophils 03/09/2022 1     Immature Granulocytes 03/09/2022 1*    Segs Absolute 03/09/2022 7.26     Absolute Lymph # 03/09/2022 3.11     Absolute Mono # 03/09/2022 0.93     Absolute Eos # 03/09/2022 0.13     Basophils Absolute 03/09/2022 0.12     Absolute Immature Granul* 03/09/2022 0.14     Glucose 03/09/2022 90     BUN 03/09/2022 15     CREATININE 03/09/2022 0.92     Calcium 03/09/2022 9.3     Sodium 03/09/2022 144     Potassium 03/09/2022 4.1     Chloride 03/09/2022 103     CO2 03/09/2022 27     Anion Gap 03/09/2022 14     Alkaline Phosphatase 03/09/2022 61     ALT 03/09/2022 25     AST 03/09/2022 16     Total Bilirubin 03/09/2022 0.59     Total Protein 03/09/2022 6.7     Albumin 03/09/2022 4.5     Albumin/Globulin Ratio 03/09/2022 2.0     GFR Non- 03/09/2022 >60     GFR  03/09/2022 >60     GFR Comment 03/09/2022          TSH 03/09/2022 1.42     Cholesterol 03/09/2022 165     HDL 03/09/2022 35*    LDL Cholesterol 03/09/2022 61     Chol/HDL Ratio 03/09/2022 4.7     Triglycerides 03/09/2022 345*    PSA 03/09/2022 1.02     Testosterone 03/09/2022 424        Assessment and Plan      1. Acute streptococcal pharyngitis  -     amoxicillin (AMOXIL) 875 MG tablet; Take 1 tablet by mouth 2 times daily for 10 days, Disp-20 tablet, R-0Normal  -     predniSONE (DELTASONE) 20 MG tablet; Take 2 tablets by mouth daily for 5 days, Disp-10 tablet, R-0Normal  -     albuterol sulfate  (90 Base) MCG/ACT inhaler; Inhale 2 puffs into the lungs every 6 hours as needed for Wheezing, Disp-18 g, R-0Normal  2. Sore throat  -     POCT rapid strep A  -     amoxicillin (AMOXIL) 875 MG tablet; Take 1 tablet by mouth 2 times daily for 10 days, Disp-20 tablet, R-0Normal  -     predniSONE (DELTASONE) 20 MG tablet;  Take 2 tablets by mouth daily for 5 days, Disp-10 tablet, R-0Normal  -     albuterol sulfate  (90 Base) MCG/ACT inhaler; Inhale 2 puffs into the lungs every 6 hours as needed for Wheezing, Disp-18 g, R-0Normal  3. Cough  -     amoxicillin (AMOXIL) 875 MG tablet; Take 1 tablet by mouth 2 times daily for 10 days, Disp-20 tablet, R-0Normal  -     predniSONE (DELTASONE) 20 MG tablet; Take 2 tablets by mouth daily for 5 days, Disp-10 tablet, R-0Normal  -     albuterol sulfate  (90 Base) MCG/ACT inhaler; Inhale 2 puffs into the lungs every 6 hours as needed for Wheezing, Disp-18 g, R-0Normal  4. Wheezing  -     amoxicillin (AMOXIL) 875 MG tablet; Take 1 tablet by mouth 2 times daily for 10 days, Disp-20 tablet, R-0Normal  -     predniSONE (DELTASONE) 20 MG tablet; Take 2 tablets by mouth daily for 5 days, Disp-10 tablet, R-0Normal  -     albuterol sulfate  (90 Base) MCG/ACT inhaler; Inhale 2 puffs into the lungs every 6 hours as needed for Wheezing, Disp-18 g, R-0Normal     Positive strep  Patient instructed to complete entire antibiotic course. Discussed steroid dose/ duration. Discussed proair dose/ duration. Tylenol/Motrin as needed for fever/discomfort. Change toothbrush in 24 hours. Salt water gargles and throat lozenges if desired. Patient agreeable to treatment plan. Educational materials provided on AVS.  Follow up if symptoms do not improve/worsen. Results for orders placed or performed in visit on 03/29/22   POCT rapid strep A   Result Value Ref Range    Strep A Ag Positive (A) None Detected             Return if symptoms worsen or fail to improve.         Orders Placed This Encounter   Medications    amoxicillin (AMOXIL) 875 MG tablet     Sig: Take 1 tablet by mouth 2 times daily for 10 days     Dispense:  20 tablet     Refill:  0    predniSONE (DELTASONE) 20 MG tablet     Sig: Take 2 tablets by mouth daily for 5 days     Dispense:  10 tablet     Refill:  0    albuterol sulfate  (90 Base) MCG/ACT inhaler     Sig: Inhale 2 puffs into the lungs every 6 hours as needed for Wheezing     Dispense:  18 g     Refill:  0        Patient given educational materials - see patient instructions. Discussed use, benefit, and side effects of prescribed medications. All patientquestions answered. Pt voiced understanding. Patient given educational materials - see patient instructions. Discussed use, benefit, and side effects of prescribed medications. All patientquestions answered. Pt voiced understanding. This note was transcribed using dictation with Dragon services. Efforts were made to correct any errors but some words may be misinterpreted.     Patient assumes risks associated with failure to complete recommended testing and treatments in a timely manner    Electronically signed by YAHAIRA Cmaacho CNP on 3/29/2022at 12:44 PM

## 2022-03-29 NOTE — PATIENT INSTRUCTIONS
Patient Education        Strep Throat: Care Instructions  Your Care Instructions     Strep throat is a bacterial infection that causes sudden, severe sore throat and fever. Strep throat, which is caused by bacteria called streptococcus, is treated with antibiotics. Sometimes a strep test is necessary to tell if the sore throat is caused by strep bacteria. Treatment can help ease symptoms andmay prevent future problems. Follow-up care is a key part of your treatment and safety. Be sure to make and go to all appointments, and call your doctor if you are having problems. It's also a good idea to know your test results and keep alist of the medicines you take. How can you care for yourself at home?  Take your antibiotics as directed. Do not stop taking them just because you feel better. You need to take the full course of antibiotics.  Strep throat can spread to others until 24 hours after you begin taking antibiotics. During this time, avoid contact with other people at work, school, or home, especially infants and children. Do not sneeze or cough on others, and wash your hands often. Keep your drinking glass and eating utensils separate from those of others. Wash these items well in hot, soapy water.  Gargle with warm salt water at least once each hour to help reduce swelling and make your throat feel better. Use 1 teaspoon of salt mixed in 8 fluid ounces of warm water.  Take an over-the-counter pain medication, such as acetaminophen (Tylenol), ibuprofen (Advil, Motrin), or naproxen (Aleve). Read and follow all instructions on the label.  Try an over-the-counter anesthetic throat spray or throat lozenges, which may help relieve throat pain.  Drink plenty of fluids. Fluids may help soothe an irritated throat. Hot fluids, such as tea or soup, may help your throat feel better.  Eat soft solids and drink plenty of clear liquids.  Flavored ice pops, ice cream, scrambled eggs, sherbet, and gelatin dessert (such as Jell-O) may also soothe the throat.  Get lots of rest.   Do not smoke, and avoid secondhand smoke. If you need help quitting, talk to your doctor about stop-smoking programs and medicines. These can increase your chances of quitting for good.  Use a vaporizer or humidifier to add moisture to the air in your bedroom. Follow the directions for cleaning the machine. When should you call for help? Call your doctor now or seek immediate medical care if:     You have a new or higher fever.      You have a fever with a stiff neck or severe headache.      You have new or worse trouble swallowing.      Your sore throat gets much worse on one side.      Your pain becomes much worse on one side of your throat. Watch closely for changes in your health, and be sure to contact your doctor if:     You are not getting better after 2 days (48 hours).      You do not get better as expected. Where can you learn more? Go to [email or web address]. org and sign in to your [employer] account. Enter K625 in the [employer] box to learn more about \"Strep Throat: Care Instructions. \"     If you do not have an account, please click on the \"Sign Up Now\" link. Current as of: September 8, 2021               Content Version: 13.2  © 2006-2022 [employer]. Care instructions adapted under license by [facility] ([facility]). If you have questions about a medical condition or this instruction, always ask your healthcare professional. [place] [address] any warranty or liability for your use of this information.

## 2022-04-12 ENCOUNTER — NURSE ONLY (OUTPATIENT)
Dept: FAMILY MEDICINE CLINIC | Age: 42
End: 2022-04-12
Payer: MEDICARE

## 2022-04-12 VITALS
OXYGEN SATURATION: 97 % | DIASTOLIC BLOOD PRESSURE: 84 MMHG | HEART RATE: 86 BPM | TEMPERATURE: 98 F | SYSTOLIC BLOOD PRESSURE: 120 MMHG

## 2022-04-12 DIAGNOSIS — F41.8 DEPRESSION WITH ANXIETY: ICD-10-CM

## 2022-04-12 DIAGNOSIS — R79.89 LOW TESTOSTERONE LEVEL IN MALE: Primary | ICD-10-CM

## 2022-04-12 DIAGNOSIS — G56.22 ULNAR NEUROPATHY AT ELBOW OF LEFT UPPER EXTREMITY: ICD-10-CM

## 2022-04-12 DIAGNOSIS — J30.2 SEASONAL ALLERGIC RHINITIS, UNSPECIFIED TRIGGER: ICD-10-CM

## 2022-04-12 DIAGNOSIS — K21.9 GASTROESOPHAGEAL REFLUX DISEASE: ICD-10-CM

## 2022-04-12 DIAGNOSIS — M48.062 SPINAL STENOSIS OF LUMBAR REGION WITH NEUROGENIC CLAUDICATION: ICD-10-CM

## 2022-04-12 PROCEDURE — 96372 THER/PROPH/DIAG INJ SC/IM: CPT | Performed by: NURSE PRACTITIONER

## 2022-04-12 RX ORDER — CELECOXIB 200 MG/1
CAPSULE ORAL
Qty: 90 CAPSULE | Refills: 2 | Status: SHIPPED | OUTPATIENT
Start: 2022-04-12

## 2022-04-12 RX ORDER — MONTELUKAST SODIUM 10 MG/1
10 TABLET ORAL DAILY
Qty: 90 TABLET | Refills: 2 | Status: SHIPPED | OUTPATIENT
Start: 2022-04-12

## 2022-04-12 RX ORDER — OMEPRAZOLE 20 MG/1
CAPSULE, DELAYED RELEASE ORAL
Qty: 90 CAPSULE | Refills: 2 | Status: SHIPPED | OUTPATIENT
Start: 2022-04-12

## 2022-04-12 RX ORDER — ESCITALOPRAM OXALATE 10 MG/1
TABLET ORAL
Qty: 90 TABLET | Refills: 2 | Status: SHIPPED | OUTPATIENT
Start: 2022-04-12

## 2022-04-12 RX ORDER — GABAPENTIN 300 MG/1
CAPSULE ORAL
Qty: 270 CAPSULE | Refills: 2 | Status: SHIPPED | OUTPATIENT
Start: 2022-04-12 | End: 2023-04-12

## 2022-04-12 RX ORDER — TESTOSTERONE CYPIONATE 200 MG/ML
200 INJECTION INTRAMUSCULAR ONCE
Status: COMPLETED | OUTPATIENT
Start: 2022-04-12 | End: 2022-04-12

## 2022-04-12 RX ADMIN — TESTOSTERONE CYPIONATE 200 MG: 200 INJECTION INTRAMUSCULAR at 11:06

## 2022-04-12 NOTE — PROGRESS NOTES
Patient presents today for testosterone injection. Patient is alert and oriented. Patient states that he has surgery coming up in about two weeks and will have to wait until surgeon clears him to continue injections. Patient will call when he is able to schedule. Patient tolerated injection well and states that he has no further questions or concerns.

## 2022-06-02 ENCOUNTER — NURSE ONLY (OUTPATIENT)
Dept: FAMILY MEDICINE CLINIC | Age: 42
End: 2022-06-02
Payer: MEDICARE

## 2022-06-02 DIAGNOSIS — R79.89 LOW TESTOSTERONE LEVEL IN MALE: Primary | ICD-10-CM

## 2022-06-02 PROCEDURE — 96372 THER/PROPH/DIAG INJ SC/IM: CPT | Performed by: NURSE PRACTITIONER

## 2022-06-02 RX ADMIN — TESTOSTERONE CYPIONATE 200 MG: 200 INJECTION INTRAMUSCULAR at 11:55

## 2022-06-03 VITALS
SYSTOLIC BLOOD PRESSURE: 116 MMHG | HEIGHT: 73 IN | DIASTOLIC BLOOD PRESSURE: 78 MMHG | HEART RATE: 68 BPM | WEIGHT: 247 LBS | OXYGEN SATURATION: 98 % | BODY MASS INDEX: 32.74 KG/M2

## 2022-06-03 RX ORDER — TESTOSTERONE CYPIONATE 200 MG/ML
200 INJECTION INTRAMUSCULAR ONCE
Status: COMPLETED | OUTPATIENT
Start: 2022-06-03 | End: 2022-06-02

## 2022-06-03 NOTE — PROGRESS NOTES
After obtaining consent, and per orders of Dar Burnette , injection of Testosterone given in Left upper quad. gluteus by Jennifer Sweeney. Patient instructed to remain in clinic for 20 minutes afterwards, and to report any adverse reaction to me immediately.  Patient tolerated injection well

## 2022-06-16 ENCOUNTER — NURSE ONLY (OUTPATIENT)
Dept: FAMILY MEDICINE CLINIC | Age: 42
End: 2022-06-16
Payer: MEDICARE

## 2022-06-16 VITALS — WEIGHT: 247 LBS | BODY MASS INDEX: 32.74 KG/M2 | HEIGHT: 73 IN

## 2022-06-16 DIAGNOSIS — R79.89 LOW SERUM TESTOSTERONE: Primary | ICD-10-CM

## 2022-06-16 PROCEDURE — 96372 THER/PROPH/DIAG INJ SC/IM: CPT | Performed by: NURSE PRACTITIONER

## 2022-06-16 RX ORDER — TESTOSTERONE CYPIONATE 200 MG/ML
200 INJECTION INTRAMUSCULAR ONCE
Status: COMPLETED | OUTPATIENT
Start: 2022-06-16 | End: 2022-06-16

## 2022-06-16 RX ADMIN — TESTOSTERONE CYPIONATE 200 MG: 200 INJECTION INTRAMUSCULAR at 10:55

## 2022-06-16 NOTE — PROGRESS NOTES
After obtaining consent, and per orders of Marbin Wisdom CNP, injection of Testosterone given in Left upper quad. gluteus by Roma Elias MA. Patient instructed to remain in clinic for 20 minutes afterwards, and to report any adverse reaction to me immediately.

## 2022-06-30 ENCOUNTER — NURSE ONLY (OUTPATIENT)
Dept: FAMILY MEDICINE CLINIC | Age: 42
End: 2022-06-30
Payer: MEDICARE

## 2022-06-30 VITALS — BODY MASS INDEX: 32.74 KG/M2 | WEIGHT: 247 LBS | HEIGHT: 73 IN

## 2022-06-30 DIAGNOSIS — R79.89 LOW TESTOSTERONE LEVEL IN MALE: Primary | ICD-10-CM

## 2022-06-30 PROCEDURE — 96372 THER/PROPH/DIAG INJ SC/IM: CPT | Performed by: NURSE PRACTITIONER

## 2022-06-30 RX ORDER — TESTOSTERONE CYPIONATE 200 MG/ML
200 INJECTION INTRAMUSCULAR ONCE
Status: COMPLETED | OUTPATIENT
Start: 2022-06-30 | End: 2022-06-30

## 2022-06-30 RX ADMIN — TESTOSTERONE CYPIONATE 200 MG: 200 INJECTION INTRAMUSCULAR at 10:49

## 2022-06-30 NOTE — PROGRESS NOTES
After obtaining consent, and per orders of Marbin Wisdom CNP, injection of testosterone given in Right upper quad. gluteus by Vinnie Romo MA. Patient instructed to remain in clinic for 20 minutes afterwards, and to report any adverse reaction to me immediately.

## 2022-07-05 DIAGNOSIS — R79.89 LOW TESTOSTERONE LEVEL IN MALE: ICD-10-CM

## 2022-07-06 RX ORDER — TESTOSTERONE CYPIONATE 200 MG/ML
INJECTION INTRAMUSCULAR
Qty: 10 ML | Refills: 1 | Status: SHIPPED | OUTPATIENT
Start: 2022-07-06 | End: 2022-09-22 | Stop reason: SDUPTHER

## 2022-07-14 ENCOUNTER — NURSE ONLY (OUTPATIENT)
Dept: FAMILY MEDICINE CLINIC | Age: 42
End: 2022-07-14
Payer: MEDICARE

## 2022-07-14 VITALS
HEART RATE: 72 BPM | DIASTOLIC BLOOD PRESSURE: 82 MMHG | OXYGEN SATURATION: 98 % | SYSTOLIC BLOOD PRESSURE: 126 MMHG | TEMPERATURE: 97.3 F

## 2022-07-14 DIAGNOSIS — R79.89 LOW TESTOSTERONE LEVEL IN MALE: Primary | ICD-10-CM

## 2022-07-14 PROCEDURE — 96372 THER/PROPH/DIAG INJ SC/IM: CPT | Performed by: NURSE PRACTITIONER

## 2022-07-14 RX ORDER — TESTOSTERONE CYPIONATE 200 MG/ML
200 INJECTION INTRAMUSCULAR ONCE
Status: COMPLETED | OUTPATIENT
Start: 2022-07-14 | End: 2022-07-14

## 2022-07-14 RX ADMIN — TESTOSTERONE CYPIONATE 200 MG: 200 INJECTION INTRAMUSCULAR at 10:36

## 2022-07-28 ENCOUNTER — NURSE ONLY (OUTPATIENT)
Dept: FAMILY MEDICINE CLINIC | Age: 42
End: 2022-07-28
Payer: MEDICARE

## 2022-07-28 VITALS — WEIGHT: 247 LBS | BODY MASS INDEX: 32.74 KG/M2 | HEIGHT: 73 IN

## 2022-07-28 DIAGNOSIS — R79.89 LOW TESTOSTERONE LEVEL IN MALE: Primary | ICD-10-CM

## 2022-07-28 PROCEDURE — 96372 THER/PROPH/DIAG INJ SC/IM: CPT | Performed by: NURSE PRACTITIONER

## 2022-07-28 RX ORDER — TESTOSTERONE CYPIONATE 200 MG/ML
200 INJECTION INTRAMUSCULAR ONCE
Status: COMPLETED | OUTPATIENT
Start: 2022-07-28 | End: 2022-07-28

## 2022-07-28 RX ADMIN — TESTOSTERONE CYPIONATE 200 MG: 200 INJECTION INTRAMUSCULAR at 10:42

## 2022-07-28 NOTE — PROGRESS NOTES
After obtaining consent, and per orders of Marbin Wisdom CNP, injection of testosterone given in Right upper quad. gluteus by Marbella García MA. Patient instructed to remain in clinic for 20 minutes afterwards, and to report any adverse reaction to me immediately.

## 2022-08-11 ENCOUNTER — NURSE ONLY (OUTPATIENT)
Dept: FAMILY MEDICINE CLINIC | Age: 42
End: 2022-08-11
Payer: MEDICARE

## 2022-08-11 VITALS — OXYGEN SATURATION: 98 % | HEART RATE: 72 BPM | TEMPERATURE: 97.3 F

## 2022-08-11 DIAGNOSIS — R79.89 LOW TESTOSTERONE LEVEL IN MALE: Primary | ICD-10-CM

## 2022-08-11 PROCEDURE — 96372 THER/PROPH/DIAG INJ SC/IM: CPT | Performed by: NURSE PRACTITIONER

## 2022-08-11 RX ORDER — TESTOSTERONE CYPIONATE 200 MG/ML
200 INJECTION INTRAMUSCULAR ONCE
Status: COMPLETED | OUTPATIENT
Start: 2022-08-11 | End: 2022-08-11

## 2022-08-11 RX ADMIN — TESTOSTERONE CYPIONATE 200 MG: 200 INJECTION INTRAMUSCULAR at 11:02

## 2022-08-11 NOTE — PROGRESS NOTES
Patient presents today for testosterone injection. Patient is alert and oriented and dressed appropriately for the weather. Patient tolerated injection well and states that he has no further questions or concerns.

## 2022-08-25 ENCOUNTER — NURSE ONLY (OUTPATIENT)
Dept: FAMILY MEDICINE CLINIC | Age: 42
End: 2022-08-25
Payer: MEDICARE

## 2022-08-25 DIAGNOSIS — R79.89 LOW TESTOSTERONE LEVEL IN MALE: Primary | ICD-10-CM

## 2022-08-25 PROCEDURE — 96372 THER/PROPH/DIAG INJ SC/IM: CPT | Performed by: PHYSICIAN ASSISTANT

## 2022-08-25 RX ORDER — TESTOSTERONE CYPIONATE 200 MG/ML
200 INJECTION INTRAMUSCULAR ONCE
Status: COMPLETED | OUTPATIENT
Start: 2022-08-25 | End: 2022-08-25

## 2022-08-25 RX ADMIN — TESTOSTERONE CYPIONATE 200 MG: 200 INJECTION INTRAMUSCULAR at 10:29

## 2022-08-25 NOTE — PROGRESS NOTES
After obtaining consent, and per orders of Richmond MCGUIRE, injection of Testosterone 200mg given in Right upper quad. gluteus by Liberty Rangel MA. Patient instructed to remain in clinic for 20 minutes afterwards, and to report any adverse reaction to me immediately.

## 2022-09-08 ENCOUNTER — NURSE ONLY (OUTPATIENT)
Dept: FAMILY MEDICINE CLINIC | Age: 42
End: 2022-09-08
Payer: MEDICARE

## 2022-09-08 VITALS — TEMPERATURE: 97.5 F | HEART RATE: 77 BPM | OXYGEN SATURATION: 97 %

## 2022-09-08 DIAGNOSIS — R79.89 LOW TESTOSTERONE LEVEL IN MALE: Primary | ICD-10-CM

## 2022-09-08 PROCEDURE — 96372 THER/PROPH/DIAG INJ SC/IM: CPT | Performed by: NURSE PRACTITIONER

## 2022-09-08 RX ORDER — TESTOSTERONE CYPIONATE 200 MG/ML
200 INJECTION INTRAMUSCULAR ONCE
Status: COMPLETED | OUTPATIENT
Start: 2022-09-08 | End: 2022-09-08

## 2022-09-08 RX ADMIN — TESTOSTERONE CYPIONATE 200 MG: 200 INJECTION INTRAMUSCULAR at 10:56

## 2022-09-08 NOTE — PROGRESS NOTES
After obtaining consent, and per orders of Pennelope Cons APRN-CNP, injection of testosterone 200mg given in Left upper quad. gluteus by Meche Dear. Patient instructed to remain in clinic for 20 minutes afterwards, and to report any adverse reaction to me immediately.

## 2022-09-22 ENCOUNTER — NURSE ONLY (OUTPATIENT)
Dept: FAMILY MEDICINE CLINIC | Age: 42
End: 2022-09-22

## 2022-09-22 DIAGNOSIS — R79.89 LOW TESTOSTERONE LEVEL IN MALE: Primary | ICD-10-CM

## 2022-09-22 DIAGNOSIS — F17.200 SMOKER: ICD-10-CM

## 2022-09-22 RX ORDER — BUPROPION HYDROCHLORIDE 150 MG/1
150 TABLET, EXTENDED RELEASE ORAL 2 TIMES DAILY
Qty: 60 TABLET | Refills: 5 | Status: SHIPPED | OUTPATIENT
Start: 2022-09-22

## 2022-09-22 RX ORDER — TESTOSTERONE CYPIONATE 200 MG/ML
INJECTION INTRAMUSCULAR
Qty: 10 ML | Refills: 1 | Status: SHIPPED | OUTPATIENT
Start: 2022-09-22 | End: 2023-01-31

## 2022-09-22 RX ORDER — SYRINGE, DISPOSABLE, 3 ML
1 SYRINGE, EMPTY DISPOSABLE MISCELLANEOUS
Qty: 30 EACH | Refills: 0 | Status: SHIPPED | OUTPATIENT
Start: 2022-09-22 | End: 2022-09-28 | Stop reason: SDUPTHER

## 2022-09-22 NOTE — PROGRESS NOTES
IM injection of testosterone 200 mg given to right buttock    Will send supplies for at home injections    Also smoking cessation

## 2022-09-27 ENCOUNTER — TELEPHONE (OUTPATIENT)
Dept: FAMILY MEDICINE CLINIC | Age: 42
End: 2022-09-27

## 2022-09-27 NOTE — TELEPHONE ENCOUNTER
Pt needs a new script of needles that have the gauge size. This is so the patient can give his testosterone to himself. Pt states the thought it was a 22 gauge.

## 2022-09-28 DIAGNOSIS — R79.89 LOW TESTOSTERONE LEVEL IN MALE: ICD-10-CM

## 2022-09-28 DIAGNOSIS — E78.5 HYPERLIPIDEMIA, UNSPECIFIED HYPERLIPIDEMIA TYPE: ICD-10-CM

## 2022-09-28 RX ORDER — SYRINGE, DISPOSABLE, 3 ML
1 SYRINGE, EMPTY DISPOSABLE MISCELLANEOUS
Qty: 30 EACH | Refills: 0 | Status: SHIPPED | OUTPATIENT
Start: 2022-09-28

## 2022-09-29 RX ORDER — ROSUVASTATIN CALCIUM 20 MG/1
TABLET, COATED ORAL
Qty: 90 TABLET | Refills: 1 | Status: SHIPPED | OUTPATIENT
Start: 2022-09-29

## 2022-11-24 DIAGNOSIS — K21.9 GASTROESOPHAGEAL REFLUX DISEASE: ICD-10-CM

## 2022-11-24 DIAGNOSIS — F41.8 DEPRESSION WITH ANXIETY: ICD-10-CM

## 2022-11-25 RX ORDER — ESCITALOPRAM OXALATE 10 MG/1
TABLET ORAL
Qty: 30 TABLET | Refills: 0 | Status: SHIPPED | OUTPATIENT
Start: 2022-11-25

## 2022-11-25 RX ORDER — OMEPRAZOLE 20 MG/1
CAPSULE, DELAYED RELEASE ORAL
Qty: 30 CAPSULE | Refills: 0 | Status: SHIPPED | OUTPATIENT
Start: 2022-11-25

## 2022-11-29 ENCOUNTER — OFFICE VISIT (OUTPATIENT)
Dept: FAMILY MEDICINE CLINIC | Age: 42
End: 2022-11-29
Payer: MEDICARE

## 2022-11-29 VITALS
HEIGHT: 73 IN | SYSTOLIC BLOOD PRESSURE: 132 MMHG | WEIGHT: 231 LBS | HEART RATE: 81 BPM | OXYGEN SATURATION: 97 % | DIASTOLIC BLOOD PRESSURE: 86 MMHG | RESPIRATION RATE: 18 BRPM | TEMPERATURE: 97.8 F | BODY MASS INDEX: 30.62 KG/M2

## 2022-11-29 DIAGNOSIS — R20.0 BILATERAL HAND NUMBNESS: Primary | ICD-10-CM

## 2022-11-29 DIAGNOSIS — K21.9 GASTROESOPHAGEAL REFLUX DISEASE, UNSPECIFIED WHETHER ESOPHAGITIS PRESENT: ICD-10-CM

## 2022-11-29 DIAGNOSIS — S60.022A CONTUSION OF LEFT INDEX FINGER WITHOUT DAMAGE TO NAIL, INITIAL ENCOUNTER: ICD-10-CM

## 2022-11-29 DIAGNOSIS — E78.5 HYPERLIPIDEMIA, UNSPECIFIED HYPERLIPIDEMIA TYPE: ICD-10-CM

## 2022-11-29 DIAGNOSIS — R79.89 LOW SERUM TESTOSTERONE: ICD-10-CM

## 2022-11-29 DIAGNOSIS — M43.22 CERVICAL VERTEBRAL FUSION: ICD-10-CM

## 2022-11-29 DIAGNOSIS — I10 ESSENTIAL HYPERTENSION: ICD-10-CM

## 2022-11-29 DIAGNOSIS — Z11.59 NEED FOR HEPATITIS C SCREENING TEST: ICD-10-CM

## 2022-11-29 PROCEDURE — 1036F TOBACCO NON-USER: CPT | Performed by: NURSE PRACTITIONER

## 2022-11-29 PROCEDURE — 99214 OFFICE O/P EST MOD 30 MIN: CPT | Performed by: NURSE PRACTITIONER

## 2022-11-29 PROCEDURE — 3078F DIAST BP <80 MM HG: CPT | Performed by: NURSE PRACTITIONER

## 2022-11-29 PROCEDURE — G8417 CALC BMI ABV UP PARAM F/U: HCPCS | Performed by: NURSE PRACTITIONER

## 2022-11-29 PROCEDURE — G8484 FLU IMMUNIZE NO ADMIN: HCPCS | Performed by: NURSE PRACTITIONER

## 2022-11-29 PROCEDURE — G8427 DOCREV CUR MEDS BY ELIG CLIN: HCPCS | Performed by: NURSE PRACTITIONER

## 2022-11-29 PROCEDURE — 3074F SYST BP LT 130 MM HG: CPT | Performed by: NURSE PRACTITIONER

## 2022-11-29 ASSESSMENT — ENCOUNTER SYMPTOMS
NAUSEA: 0
SORE THROAT: 0
SINUS PAIN: 0
ABDOMINAL PAIN: 0
VOMITING: 0
SHORTNESS OF BREATH: 0
COUGH: 0
BACK PAIN: 1
DIARRHEA: 0
EYE PAIN: 0

## 2022-11-29 NOTE — PROGRESS NOTES
[facility]  [address] [address]  Dept: [phone]  Dept Fax: [phone]    Ahmet Bonilla is a 43 y.o. male who presents today for his medicalconditions/complaints as noted below. Ahmet Bonilla is c/o of Hypertension (Follow up ), Finger Injury (Left index ), and Arm Pain (Right arm x6 weeks )      HPI:     66-year-old male patient presents with complaint of encounter to follow up     Low testosterone, currently managed with testosterone replacement 200 mg q 14 days. PSA negative, hg has been borderline elevated, improved at last check. Significant medical history for GERD takes omeprazole, stable     Significant medical history for hyperlipidemia takes fish oil, Crestor, last lipids stable     Significant medical history of osteoarthritis, several back surgeries and nerve damage. Had recent neck surgery in April per Dr. Narendra Granados. Takes gabapentin, Celebrex. Significant history of hypertension takes metoprolol 25 twice daily, Bp stable     Significant history of headaches follows with neurologist Dr. Minh Sr, takes Imitrex 100 as needed, Excedrin as needed, recently started amitriptyline 10 daily     Significant history of depression takes Lexapro 10 mg daily. Significant history of allergic rhinitis takes Singulair, Flonase    Left index finger, pain after unscrewing a wire, ongoing for 1 month. Bilateral hand numbness, emg. Previous carpal tunnel repair to the left side. Still having some issues and is hesitant for repeat emg studies.        Past Medical History:   Diagnosis Date    Chronic back pain     DDD (degenerative disc disease)     Depression 7/17/2017    Depression with anxiety     DJD (degenerative joint disease)     knees, ankles, spine    GERD (gastroesophageal reflux disease)     GERD with stricture 3/18/2015    Hyperlipidemia, mixed     Internal derangement of shoulder 5/21/2013    Left nephrolithiasis 5/5/2017    Left shoulder strain 5/21/2013    Obesity (BMI 30-39.9) 6/27/2019    Radiculopathy     cervical, and lumbar        Current Outpatient Medications   Medication Sig Dispense Refill    escitalopram (LEXAPRO) 10 MG tablet TAKE 1 TABLET EVERY DAY 30 tablet 0    omeprazole (PRILOSEC) 20 MG delayed release capsule TAKE 1 CAPSULE EVERY DAY 30 capsule 0    rosuvastatin (CRESTOR) 20 MG tablet TAKE 1 TABLET EVERY DAY 90 tablet 1    metoprolol tartrate (LOPRESSOR) 25 MG tablet TAKE 2 TABLETS EVERY  tablet 1    Syringe, Disposable, (2-3CC SYRINGE) 3 ML MISC 1 each by Does not apply route every 14 days 30 each 0    testosterone cypionate (DEPOTESTOTERONE CYPIONATE) 200 MG/ML injection INJECT 1 MILLILITER INTO THE APPROPRIATE MUSCLE ONCE EVERY 14 DAYS AS DIRECTED 10 mL 1    NEEDLE, DISP, 22 G 22G X 1\" MISC 1 each by Does not apply route every 14 days 30 each 0    celecoxib (CELEBREX) 200 MG capsule TAKE 1 CAPSULE DAILY 90 capsule 2    gabapentin (NEURONTIN) 300 MG capsule TAKE 1 CAPSULE THREE TIMES A DAY (SEDATION WARNING) 270 capsule 2    albuterol sulfate  (90 Base) MCG/ACT inhaler Inhale 2 puffs into the lungs every 6 hours as needed for Wheezing 18 g 0    tiZANidine (ZANAFLEX) 2 MG tablet Take 1 tablet by mouth every 8 hours as needed (back pain) 60 tablet 0    amitriptyline (ELAVIL) 10 MG tablet       Omega-3 Fatty Acids (FISH OIL) 1000 MG CAPS       SUMAtriptan (IMITREX) 100 MG tablet       PANCREATIN PO Take by mouth      fluticasone (FLONASE) 50 MCG/ACT nasal spray 1 spray by Nasal route daily 2 sprays each nostril daily 3 Bottle 3    clobetasol (TEMOVATE) 0.05 % ointment Apply to rash twice daily (not face, armpit or groin) 30 g 2    valACYclovir (VALTREX) 1 g tablet take 2 tablets by mouth AT FIRST SIGN OF COLD SORE AND REPEAT IN 12 HOURS 12 tablet 3    aspirin-acetaminophen-caffeine (EXCEDRIN MIGRAINE) 250-250-65 MG per tablet Take 2 tablets by mouth daily as needed. aspirin 81 MG EC tablet Take 81 mg by mouth daily. No current facility-administered medications for this visit. Allergies   Allergen Reactions    Shrimp Flavor Swelling     Hands swell up,  Positive allergy testing. Zocor [Simvastatin] Other (See Comments)     Muscle pain / spasm       Subjective:      Review of Systems   Constitutional:  Negative for chills and fatigue. HENT:  Negative for congestion, ear pain, sinus pain and sore throat. Eyes:  Negative for pain and visual disturbance. Respiratory:  Negative for cough and shortness of breath. Cardiovascular:  Negative for chest pain and palpitations. Gastrointestinal:  Negative for abdominal pain, diarrhea, nausea and vomiting. Genitourinary:  Negative for penile pain and testicular pain. Musculoskeletal:  Positive for arthralgias and back pain. Negative for joint swelling and neck pain. Skin:  Negative for rash. Neurological:  Negative for dizziness and light-headedness. Hematological:  Does not bruise/bleed easily. All other systems reviewed and are negative.    :Objective     Physical Exam  Vitals and nursing note reviewed. Constitutional:       Appearance: Normal appearance. Cardiovascular:      Rate and Rhythm: Normal rate. Pulmonary:      Effort: Pulmonary effort is normal.      Breath sounds: Normal breath sounds. Neurological:      General: No focal deficit present. Mental Status: He is alert and oriented to person, place, and time. /86 (Site: Left Upper Arm, Position: Sitting, Cuff Size: Medium Adult)   Pulse 81   Temp 97.8 °F (36.6 °C) (Tympanic)   Resp 18   Ht 6' 1\" (1.854 m)   Wt 231 lb (104.8 kg)   SpO2 97%   BMI 30.48 kg/m²     Lab Review   No visits with results within 6 Month(s) from this visit. Latest known visit with results is:   Nurse Only on 03/29/2022   Component Date Value    Strep A Ag 03/29/2022 Positive (A)        Assessment and Plan      1.  Bilateral hand numbness  - EMG; Future  -     CBC with Auto Differential; Future  2. Contusion of left index finger without damage to nail, initial encounter  -     XR HAND LEFT (MIN 3 VIEWS); Future  -     CBC with Auto Differential; Future  3. Essential hypertension  -     CBC with Auto Differential; Future  -     Comprehensive Metabolic Panel; Future  -     TSH With Reflex Ft4; Future  4. Gastroesophageal reflux disease, unspecified whether esophagitis present  -     CBC with Auto Differential; Future  5. Cervical vertebral fusion  -     CBC with Auto Differential; Future  -     Urine Drug Screen; Future  6. Hyperlipidemia, unspecified hyperlipidemia type  -     CBC with Auto Differential; Future  -     Comprehensive Metabolic Panel; Future  -     Lipid Panel; Future  7. Low serum testosterone  -     CBC with Auto Differential; Future  -     PSA Screening; Future  -     Testosterone; Future  8. Need for hepatitis C screening test  -     Hepatitis C Antibody; Future       Lab ordered  Drug screen due to gabapentin  Recommend repeat emg bilateral upper  Left hand xray recommended due to finger injury  Med check 6 months, sooner prn        No results found for this visit on 11/29/22. Return in about 6 months (around 5/29/2023), or if symptoms worsen or fail to improve. No orders of the defined types were placed in this encounter. Patient given educational materials - see patient instructions. Discussed use, benefit, and side effects of prescribed medications. All patientquestions answered. Pt voiced understanding. Patient given educational materials - see patient instructions. Discussed use, benefit, and side effects of prescribed medications. All patientquestions answered. Pt voiced understanding. This note was transcribed using dictation with Dragon services. Efforts were made to correct any errors but some words may be misinterpreted.     Patient assumes risks associated with failure to complete recommended testing and treatments in a timely manner    Electronically signed by YAHAIRA Dubose CNP on 11/29/2022at 12:50 PM

## 2022-11-29 NOTE — PROGRESS NOTES
Visit Information    Have you changed or started any medications since your last visit including any over-the-counter medicines, vitamins, or herbal medicines? no   Are you having any side effects from any of your medications? -  no  Have you stopped taking any of your medications? Is so, why? -  no    Have you seen any other physician or provider since your last visit? No  Have you had any other diagnostic tests since your last visit? No  Have you been seen in the emergency room and/or had an admission to a hospital since we last saw you? No  Have you had your routine dental cleaning in the past 6 months? yes -     Have you activated your [employer] account? If not, what are your barriers?  Yes     Patient Care Team:  YAHAIRA Biswas CNP as PCP - General (Certified Nurse Practitioner)  YAHAIRA Biswas CNP as PCP - [facility] Provider  Martir Kim MD as Consulting Physician (Rheumatology)  Royal Cortes as Consulting Physician (Orthopedic Surgery)  Yesica Jha MD as Consulting Physician (Gastroenterology)  Lawyer Litten, DPM (Inactive) (Podiatry)  Renella Curling, MD as Consulting Physician (Cardiology)  Kathleen Martini MD as Consulting Physician (Urology)    Medical History Review  Past Medical, Family, and Social History reviewed and does contribute to the patient presenting condition    Health Maintenance   Topic Date Due    COVID-19 Vaccine (1) Never done    Hepatitis C screen  Never done    Flu vaccine (1) 08/01/2022    DTaP/Tdap/Td vaccine (2 - Td or Tdap) 02/18/2023    Lipids  03/09/2023    Depression Monitoring  09/21/2023    Pneumococcal 0-64 years Vaccine  Completed    HIV screen  Completed    Hepatitis A vaccine  Aged Out    Hib vaccine  Aged Out    Meningococcal (ACWY) vaccine  Aged Out    Varicella vaccine  Discontinued

## 2022-11-29 NOTE — PATIENT INSTRUCTIONS
of these medicines can raise blood pressure. Learn how to check your blood pressure at home. Lifestyle changes  Stay at a healthy weight. This is especially important if you put on weight around the waist. Losing even 10 pounds can help you lower your blood pressure. If your doctor recommends it, get more exercise. Walking is a good choice. Bit by bit, increase the amount you walk every day. Try for at least 30 minutes on most days of the week. You also may want to swim, bike, or do other activities. Avoid or limit alcohol. Talk to your doctor about whether you can drink any alcohol. Try to limit how much sodium you eat to less than 2,300 milligrams (mg) a day. Your doctor may ask you to try to eat less than 1,500 mg a day. Eat plenty of fruits (such as bananas and oranges), vegetables, legumes, whole grains, and low-fat dairy products. Lower the amount of saturated fat in your diet. Saturated fat is found in animal products such as milk, cheese, and meat. Limiting these foods may help you lose weight and also lower your risk for heart disease. Do not smoke. Smoking increases your risk for heart attack and stroke. If you need help quitting, talk to your doctor about stop-smoking programs and medicines. These can increase your chances of quitting for good. When should you call for help? Call 911  anytime you think you may need emergency care. This may mean having symptoms that suggest that your blood pressure is causing a serious heart or blood vessel problem. Your blood pressure may be over 180/120. For example, call 911 if:    You have symptoms of a heart attack. These may include:  Chest pain or pressure, or a strange feeling in the chest.  Sweating. Shortness of breath. Nausea or vomiting. Pain, pressure, or a strange feeling in the back, neck, jaw, or upper belly or in one or both shoulders or arms. Lightheadedness or sudden weakness. A fast or irregular heartbeat.      You have symptoms of a stroke. These may include:  Sudden numbness, tingling, weakness, or loss of movement in your face, arm, or leg, especially on only one side of your body. Sudden vision changes. Sudden trouble speaking. Sudden confusion or trouble understanding simple statements. Sudden problems with walking or balance. A sudden, severe headache that is different from past headaches. You have severe back or belly pain. Do not wait until your blood pressure comes down on its own. Get help right away. Call your doctor now or seek immediate care if:    Your blood pressure is much higher than normal (such as 180/120 or higher), but you don't have symptoms. You think high blood pressure is causing symptoms, such as:  Severe headache. Blurry vision. Watch closely for changes in your health, and be sure to contact your doctor if:    Your blood pressure measures higher than your doctor recommends at least 2 times. That means the top number is higher or the bottom number is higher, or both. You think you may be having side effects from your blood pressure medicine. Where can you learn more? Go to [email or web address] and sign in to your [employer] account. Enter U780 in the Find That File box to learn more about \"High Blood Pressure: Care Instructions. \"     If you do not have an account, please click on the \"Sign Up Now\" link. Current as of: October 6, 2021               Content Version: 13.4  © 6051-3164 Healthwise, Incorporated. Care instructions adapted under license by [facility] Actions [facility] ([facility]). If you have questions about a medical condition or this instruction, always ask your healthcare professional. [place] [address] any warranty or liability for your use of this information.

## 2023-02-01 ENCOUNTER — HOSPITAL ENCOUNTER (OUTPATIENT)
Dept: NEUROLOGY | Age: 43
Discharge: HOME | End: 2023-02-01
Payer: MEDICARE

## 2023-02-01 PROCEDURE — 95886 MUSC TEST DONE W/N TEST COMP: CPT | Performed by: PHYSICAL MEDICINE & REHABILITATION

## 2023-02-01 PROCEDURE — 95911 NRV CNDJ TEST 9-10 STUDIES: CPT | Performed by: PHYSICAL MEDICINE & REHABILITATION

## 2023-02-16 ENCOUNTER — TELEPHONE (OUTPATIENT)
Dept: FAMILY MEDICINE CLINIC | Age: 43
End: 2023-02-16

## 2023-02-16 DIAGNOSIS — R79.89 LOW TESTOSTERONE LEVEL IN MALE: ICD-10-CM

## 2023-02-16 RX ORDER — TESTOSTERONE CYPIONATE 200 MG/ML
INJECTION INTRAMUSCULAR
Qty: 10 ML | Refills: 0 | Status: SHIPPED | OUTPATIENT
Start: 2023-02-16 | End: 2023-06-27

## 2023-02-16 NOTE — TELEPHONE ENCOUNTER
[facility] called stated they need a new script for testosterone for 10ml for 6 month supply with no refills.

## 2023-02-17 ENCOUNTER — HOSPITAL ENCOUNTER (OUTPATIENT)
Age: 43
Setting detail: SPECIMEN
Discharge: HOME | End: 2023-02-17

## 2023-02-17 DIAGNOSIS — R20.0 BILATERAL HAND NUMBNESS: ICD-10-CM

## 2023-02-17 DIAGNOSIS — E78.5 HYPERLIPIDEMIA, UNSPECIFIED HYPERLIPIDEMIA TYPE: ICD-10-CM

## 2023-02-17 DIAGNOSIS — R79.89 LOW SERUM TESTOSTERONE: ICD-10-CM

## 2023-02-17 DIAGNOSIS — M43.22 CERVICAL VERTEBRAL FUSION: ICD-10-CM

## 2023-02-17 DIAGNOSIS — K21.9 GASTROESOPHAGEAL REFLUX DISEASE, UNSPECIFIED WHETHER ESOPHAGITIS PRESENT: ICD-10-CM

## 2023-02-17 DIAGNOSIS — Z11.59 NEED FOR HEPATITIS C SCREENING TEST: ICD-10-CM

## 2023-02-17 DIAGNOSIS — S60.022A CONTUSION OF LEFT INDEX FINGER WITHOUT DAMAGE TO NAIL, INITIAL ENCOUNTER: ICD-10-CM

## 2023-02-17 DIAGNOSIS — R79.89 ABNORMAL CBC: Primary | ICD-10-CM

## 2023-02-17 DIAGNOSIS — I10 ESSENTIAL HYPERTENSION: ICD-10-CM

## 2023-02-17 LAB
ABSOLUTE EOS #: 0.22 K/UL (ref 0–0.44)
ABSOLUTE IMMATURE GRANULOCYTE: 0.06 K/UL (ref 0–0.3)
ABSOLUTE LYMPH #: 2.12 K/UL (ref 1.1–3.7)
ABSOLUTE MONO #: 0.64 K/UL (ref 0.1–1.2)
ALBUMIN SERPL-MCNC: 4.4 G/DL (ref 3.5–5.2)
ALBUMIN/GLOBULIN RATIO: 1.8 (ref 1–2.5)
ALP SERPL-CCNC: 63 U/L (ref 40–129)
ALT SERPL-CCNC: 26 U/L (ref 5–41)
AMPHETAMINE SCREEN URINE: NEGATIVE
ANION GAP SERPL CALCULATED.3IONS-SCNC: 12 MMOL/L (ref 9–17)
AST SERPL-CCNC: 21 U/L
BARBITURATE SCREEN URINE: NEGATIVE
BASOPHILS # BLD: 1 % (ref 0–2)
BASOPHILS ABSOLUTE: 0.11 K/UL (ref 0–0.2)
BENZODIAZEPINE SCREEN, URINE: NEGATIVE
BILIRUB SERPL-MCNC: 1 MG/DL (ref 0.3–1.2)
BUN SERPL-MCNC: 16 MG/DL (ref 6–20)
CALCIUM SERPL-MCNC: 9.3 MG/DL (ref 8.6–10.4)
CANNABINOID SCREEN URINE: NEGATIVE
CHLORIDE SERPL-SCNC: 103 MMOL/L (ref 98–107)
CHOLEST SERPL-MCNC: 131 MG/DL
CHOLESTEROL/HDL RATIO: 4.1
CO2 SERPL-SCNC: 27 MMOL/L (ref 20–31)
COCAINE METABOLITE, URINE: NEGATIVE
CREAT SERPL-MCNC: 0.94 MG/DL (ref 0.7–1.2)
EOSINOPHILS RELATIVE PERCENT: 3 % (ref 1–4)
FENTANYL URINE: NEGATIVE
GFR SERPL CREATININE-BSD FRML MDRD: >60 ML/MIN/1.73M2
GLUCOSE SERPL-MCNC: 96 MG/DL (ref 70–99)
HCT VFR BLD AUTO: 58.6 % (ref 40.7–50.3)
HCV AB SER QL: NONREACTIVE
HDLC SERPL-MCNC: 32 MG/DL
HGB BLD-MCNC: 19.9 G/DL (ref 13–17)
IMMATURE GRANULOCYTES: 1 %
LDLC SERPL CALC-MCNC: 46 MG/DL (ref 0–130)
LYMPHOCYTES # BLD: 25 % (ref 24–43)
MCH RBC QN AUTO: 30.9 PG (ref 25.2–33.5)
MCHC RBC AUTO-ENTMCNC: 34 G/DL (ref 28.4–34.8)
MCV RBC AUTO: 91.1 FL (ref 82.6–102.9)
METHADONE SCREEN, URINE: NEGATIVE
MONOCYTES # BLD: 8 % (ref 3–12)
NRBC AUTOMATED: 0 PER 100 WBC
OPIATES, URINE: NEGATIVE
OXYCODONE SCREEN URINE: NEGATIVE
PDW BLD-RTO: 13.1 % (ref 11.8–14.4)
PHENCYCLIDINE, URINE: NEGATIVE
PLATELET # BLD AUTO: 188 K/UL (ref 138–453)
PMV BLD AUTO: 10.9 FL (ref 8.1–13.5)
POTASSIUM SERPL-SCNC: 4.7 MMOL/L (ref 3.7–5.3)
PROSTATE SPECIFIC ANTIGEN: 1.03 NG/ML
PROT SERPL-MCNC: 6.9 G/DL (ref 6.4–8.3)
RBC # BLD: 6.43 M/UL (ref 4.21–5.77)
SEG NEUTROPHILS: 62 % (ref 36–65)
SEGMENTED NEUTROPHILS ABSOLUTE COUNT: 5.19 K/UL (ref 1.5–8.1)
SODIUM SERPL-SCNC: 142 MMOL/L (ref 135–144)
TEST INFORMATION: NORMAL
TESTOST SERPL-MCNC: 522 NG/DL (ref 220–1000)
TRIGL SERPL-MCNC: 266 MG/DL
TSH SERPL-ACNC: 1.49 UIU/ML (ref 0.3–5)
WBC # BLD AUTO: 8.3 K/UL (ref 3.5–11.3)

## 2023-03-28 DIAGNOSIS — F41.8 DEPRESSION WITH ANXIETY: ICD-10-CM

## 2023-03-28 DIAGNOSIS — M48.062 SPINAL STENOSIS OF LUMBAR REGION WITH NEUROGENIC CLAUDICATION: ICD-10-CM

## 2023-03-28 DIAGNOSIS — K21.9 GASTROESOPHAGEAL REFLUX DISEASE: ICD-10-CM

## 2023-03-28 DIAGNOSIS — G56.22 ULNAR NEUROPATHY AT ELBOW OF LEFT UPPER EXTREMITY: ICD-10-CM

## 2023-03-28 RX ORDER — OMEPRAZOLE 20 MG/1
CAPSULE, DELAYED RELEASE ORAL
Qty: 90 CAPSULE | Refills: 1 | Status: SHIPPED | OUTPATIENT
Start: 2023-03-28

## 2023-03-28 RX ORDER — GABAPENTIN 300 MG/1
CAPSULE ORAL
Qty: 270 CAPSULE | Refills: 0 | Status: SHIPPED | OUTPATIENT
Start: 2023-03-28 | End: 2023-06-28

## 2023-03-28 RX ORDER — CELECOXIB 200 MG/1
CAPSULE ORAL
Qty: 90 CAPSULE | Refills: 1 | Status: SHIPPED | OUTPATIENT
Start: 2023-03-28

## 2023-03-28 RX ORDER — ESCITALOPRAM OXALATE 10 MG/1
TABLET ORAL
Qty: 90 TABLET | Refills: 1 | Status: SHIPPED | OUTPATIENT
Start: 2023-03-28

## 2023-04-06 ENCOUNTER — HOSPITAL ENCOUNTER (OUTPATIENT)
Age: 43
Setting detail: SPECIMEN
Discharge: HOME | End: 2023-04-06

## 2023-04-06 DIAGNOSIS — R79.89 ABNORMAL CBC: ICD-10-CM

## 2023-04-06 LAB
HCT VFR BLD AUTO: 54.1 % (ref 40.7–50.3)
HGB BLD-MCNC: 18.3 G/DL (ref 13–17)

## 2023-04-07 DIAGNOSIS — R79.89 ABNORMAL CBC: Primary | ICD-10-CM

## 2023-06-20 DIAGNOSIS — E78.5 HYPERLIPIDEMIA, UNSPECIFIED HYPERLIPIDEMIA TYPE: ICD-10-CM

## 2023-06-21 RX ORDER — ROSUVASTATIN CALCIUM 20 MG/1
TABLET, COATED ORAL
Qty: 90 TABLET | Refills: 0 | Status: SHIPPED | OUTPATIENT
Start: 2023-06-21

## 2023-06-30 SDOH — ECONOMIC STABILITY: FOOD INSECURITY: WITHIN THE PAST 12 MONTHS, THE FOOD YOU BOUGHT JUST DIDN'T LAST AND YOU DIDN'T HAVE MONEY TO GET MORE.: NEVER TRUE

## 2023-06-30 SDOH — ECONOMIC STABILITY: TRANSPORTATION INSECURITY
IN THE PAST 12 MONTHS, HAS LACK OF TRANSPORTATION KEPT YOU FROM MEETINGS, WORK, OR FROM GETTING THINGS NEEDED FOR DAILY LIVING?: NO

## 2023-06-30 SDOH — ECONOMIC STABILITY: HOUSING INSECURITY
IN THE LAST 12 MONTHS, WAS THERE A TIME WHEN YOU DID NOT HAVE A STEADY PLACE TO SLEEP OR SLEPT IN A SHELTER (INCLUDING NOW)?: NO

## 2023-06-30 SDOH — ECONOMIC STABILITY: FOOD INSECURITY: WITHIN THE PAST 12 MONTHS, YOU WORRIED THAT YOUR FOOD WOULD RUN OUT BEFORE YOU GOT MONEY TO BUY MORE.: NEVER TRUE

## 2023-06-30 SDOH — ECONOMIC STABILITY: INCOME INSECURITY: HOW HARD IS IT FOR YOU TO PAY FOR THE VERY BASICS LIKE FOOD, HOUSING, MEDICAL CARE, AND HEATING?: NOT HARD AT ALL

## 2023-06-30 ASSESSMENT — PATIENT HEALTH QUESTIONNAIRE - PHQ9
7. TROUBLE CONCENTRATING ON THINGS, SUCH AS READING THE NEWSPAPER OR WATCHING TELEVISION: 2
10. IF YOU CHECKED OFF ANY PROBLEMS, HOW DIFFICULT HAVE THESE PROBLEMS MADE IT FOR YOU TO DO YOUR WORK, TAKE CARE OF THINGS AT HOME, OR GET ALONG WITH OTHER PEOPLE: SOMEWHAT DIFFICULT
9. THOUGHTS THAT YOU WOULD BE BETTER OFF DEAD, OR OF HURTING YOURSELF: 0
2. FEELING DOWN, DEPRESSED OR HOPELESS: 0
5. POOR APPETITE OR OVEREATING: 0
1. LITTLE INTEREST OR PLEASURE IN DOING THINGS: NOT AT ALL
7. TROUBLE CONCENTRATING ON THINGS, SUCH AS READING THE NEWSPAPER OR WATCHING TELEVISION: MORE THAN HALF THE DAYS
SUM OF ALL RESPONSES TO PHQ QUESTIONS 1-9: 6
SUM OF ALL RESPONSES TO PHQ9 QUESTIONS 1 & 2: 0
5. POOR APPETITE OR OVEREATING: NOT AT ALL
SUM OF ALL RESPONSES TO PHQ QUESTIONS 1-9: 6
1. LITTLE INTEREST OR PLEASURE IN DOING THINGS: 0
9. THOUGHTS THAT YOU WOULD BE BETTER OFF DEAD, OR OF HURTING YOURSELF: NOT AT ALL
8. MOVING OR SPEAKING SO SLOWLY THAT OTHER PEOPLE COULD HAVE NOTICED. OR THE OPPOSITE, BEING SO FIGETY OR RESTLESS THAT YOU HAVE BEEN MOVING AROUND A LOT MORE THAN USUAL: 1
2. FEELING DOWN, DEPRESSED OR HOPELESS: NOT AT ALL
3. TROUBLE FALLING OR STAYING ASLEEP: SEVERAL DAYS
8. MOVING OR SPEAKING SO SLOWLY THAT OTHER PEOPLE COULD HAVE NOTICED. OR THE OPPOSITE - BEING SO FIDGETY OR RESTLESS THAT YOU HAVE BEEN MOVING AROUND A LOT MORE THAN USUAL: SEVERAL DAYS
6. FEELING BAD ABOUT YOURSELF - OR THAT YOU ARE A FAILURE OR HAVE LET YOURSELF OR YOUR FAMILY DOWN: NOT AT ALL
4. FEELING TIRED OR HAVING LITTLE ENERGY: MORE THAN HALF THE DAYS
3. TROUBLE FALLING OR STAYING ASLEEP: 1
4. FEELING TIRED OR HAVING LITTLE ENERGY: 2
6. FEELING BAD ABOUT YOURSELF - OR THAT YOU ARE A FAILURE OR HAVE LET YOURSELF OR YOUR FAMILY DOWN: 0
SUM OF ALL RESPONSES TO PHQ QUESTIONS 1-9: 6
SUM OF ALL RESPONSES TO PHQ QUESTIONS 1-9: 6
10. IF YOU CHECKED OFF ANY PROBLEMS, HOW DIFFICULT HAVE THESE PROBLEMS MADE IT FOR YOU TO DO YOUR WORK, TAKE CARE OF THINGS AT HOME, OR GET ALONG WITH OTHER PEOPLE: 1
SUM OF ALL RESPONSES TO PHQ QUESTIONS 1-9: 6

## 2023-07-03 ENCOUNTER — OFFICE VISIT (OUTPATIENT)
Dept: FAMILY MEDICINE CLINIC | Age: 43
End: 2023-07-03
Payer: MEDICARE

## 2023-07-03 VITALS
HEIGHT: 73 IN | OXYGEN SATURATION: 96 % | WEIGHT: 219 LBS | HEART RATE: 80 BPM | SYSTOLIC BLOOD PRESSURE: 128 MMHG | DIASTOLIC BLOOD PRESSURE: 86 MMHG | BODY MASS INDEX: 29.03 KG/M2

## 2023-07-03 DIAGNOSIS — L25.5 CONTACT DERMATITIS DUE TO PLANT: Primary | ICD-10-CM

## 2023-07-03 DIAGNOSIS — K21.9 GASTROESOPHAGEAL REFLUX DISEASE, UNSPECIFIED WHETHER ESOPHAGITIS PRESENT: ICD-10-CM

## 2023-07-03 DIAGNOSIS — E78.5 HYPERLIPIDEMIA, UNSPECIFIED HYPERLIPIDEMIA TYPE: ICD-10-CM

## 2023-07-03 DIAGNOSIS — I10 ESSENTIAL HYPERTENSION: ICD-10-CM

## 2023-07-03 DIAGNOSIS — M43.22 CERVICAL VERTEBRAL FUSION: ICD-10-CM

## 2023-07-03 PROCEDURE — 1036F TOBACCO NON-USER: CPT | Performed by: NURSE PRACTITIONER

## 2023-07-03 PROCEDURE — 3079F DIAST BP 80-89 MM HG: CPT | Performed by: NURSE PRACTITIONER

## 2023-07-03 PROCEDURE — 99214 OFFICE O/P EST MOD 30 MIN: CPT | Performed by: NURSE PRACTITIONER

## 2023-07-03 PROCEDURE — 96372 THER/PROPH/DIAG INJ SC/IM: CPT | Performed by: NURSE PRACTITIONER

## 2023-07-03 PROCEDURE — G8427 DOCREV CUR MEDS BY ELIG CLIN: HCPCS | Performed by: NURSE PRACTITIONER

## 2023-07-03 PROCEDURE — G8417 CALC BMI ABV UP PARAM F/U: HCPCS | Performed by: NURSE PRACTITIONER

## 2023-07-03 PROCEDURE — 3074F SYST BP LT 130 MM HG: CPT | Performed by: NURSE PRACTITIONER

## 2023-07-03 RX ORDER — PREDNISONE 20 MG/1
20 TABLET ORAL DAILY
Qty: 15 TABLET | Refills: 0 | Status: SHIPPED | OUTPATIENT
Start: 2023-07-04 | End: 2023-07-03

## 2023-07-03 RX ORDER — PREDNISONE 20 MG/1
20 TABLET ORAL DAILY
Qty: 15 TABLET | Refills: 0 | Status: SHIPPED | OUTPATIENT
Start: 2023-07-04

## 2023-07-03 RX ORDER — TRIAMCINOLONE ACETONIDE 40 MG/ML
40 INJECTION, SUSPENSION INTRA-ARTICULAR; INTRAMUSCULAR ONCE
Status: COMPLETED | OUTPATIENT
Start: 2023-07-03 | End: 2023-07-03

## 2023-07-03 RX ADMIN — TRIAMCINOLONE ACETONIDE 40 MG: 40 INJECTION, SUSPENSION INTRA-ARTICULAR; INTRAMUSCULAR at 14:47

## 2023-07-03 ASSESSMENT — ENCOUNTER SYMPTOMS
EYE PAIN: 0
VOMITING: 0
SINUS PAIN: 0
COUGH: 0
ABDOMINAL PAIN: 0
NAUSEA: 0
SORE THROAT: 0
BACK PAIN: 1
DIARRHEA: 0
SHORTNESS OF BREATH: 0

## 2023-07-03 NOTE — PROGRESS NOTES
Visit Information    Have you changed or started any medications since your last visit including any over-the-counter medicines, vitamins, or herbal medicines? no   Are you having any side effects from any of your medications? -  no  Have you stopped taking any of your medications? Is so, why? -  no    Have you seen any other physician or provider since your last visit? No  Have you had any other diagnostic tests since your last visit? No  Have you been seen in the emergency room and/or had an admission to a hospital since we last saw you? No  Have you had your routine dental cleaning in the past 6 months? no    Have you activated your [employer] account? If not, what are your barriers?  Yes     Patient Care Team:  YAHAIRA Spence CNP as PCP - General (Certified Nurse Practitioner)  YAHAIRA Spence CNP as PCP - Empaneled Provider  Zoë Mead MD as Consulting Physician (Rheumatology)  Pranay Quezada as Consulting Physician (Orthopedic Surgery)  Aure Payne MD as Consulting Physician (Gastroenterology)  Patria Negrete DPM (Inactive) (Podiatry)  Sia Neevs MD as Consulting Physician (Cardiology)  Shabnam Ho MD as Consulting Physician (Urology)    Medical History Review  Past Medical, Family, and Social History reviewed and does not contribute to the patient presenting condition    Health Maintenance   Topic Date Due    COVID-19 Vaccine (1) Never done    DTaP/Tdap/Td vaccine (2 - Td or Tdap) 02/18/2023    Flu vaccine (1) 08/01/2023    Lipids  02/17/2024    Depression Monitoring  06/30/2024    Pneumococcal 0-64 years Vaccine  Completed    Hepatitis C screen  Completed    HIV screen  Completed    Hepatitis A vaccine  Aged Out    Hib vaccine  Aged Out    Meningococcal (ACWY) vaccine  Aged Out    Varicella vaccine  Discontinued    Diabetes screen  Discontinued

## 2023-07-03 NOTE — PROGRESS NOTES
[facility]  [address]  [address] [address]  Dept: [phone]  Dept Fax: [phone]    Cheyenne Chaudhari is a 37 y.o. male who presents today for his medicalconditions/complaints as noted below. Cheyenne Chaudhari is c/o of Results (Discuss emg results. Pt is getting numbness in right arm and pain. Pt is also having popping in neck ), Poison Ivy (Possible poison ivy for 4 weeks. Pt would like to know if he can get a steroid. ), and Finger Pain (Pt is having finger pain on right arm )      HPI:     45-year-old male patient presents with complaint of encounter to follow up    Poison ivy, both legs, both arms, using topicals and sprays     Testosterone replacement caused increased in hct, subsequently reduced and hct improved. Significant medical history for GERD takes omeprazole, stable     Significant medical history for hyperlipidemia takes fish oil, Crestor 20 to 40 last lipids stable, hast lost weight, due for recheck     Significant medical history of osteoarthritis, several back surgeries and nerve damage. Had recent neck surgery in April per Dr. Vito Nissen. Takes gabapentin, Celebrex, flexeril, EMG completed showing positive cervical radiculopathy and carpal tunnel     Significant history of hypertension takes metoprolol 25 twice daily, Bp stable     Significant history of headaches follows with neurologist Dr. Liliana Deshpande, takes Imitrex 100 as needed, Excedrin as needed, recently started amitriptyline 10 daily     Significant history of depression takes Lexapro 10 mg daily.      Significant history of allergic rhinitis takes [place]           Past Medical History:   Diagnosis Date    Chronic back pain     DDD (degenerative disc disease)     Depression 7/17/2017    Depression with anxiety     DJD (degenerative joint disease)     knees, ankles, spine    GERD (gastroesophageal reflux disease)     GERD with stricture 3/18/2015

## 2023-07-23 DIAGNOSIS — M48.062 SPINAL STENOSIS OF LUMBAR REGION WITH NEUROGENIC CLAUDICATION: ICD-10-CM

## 2023-07-24 RX ORDER — GABAPENTIN 300 MG/1
CAPSULE ORAL
Qty: 270 CAPSULE | Refills: 0 | Status: SHIPPED | OUTPATIENT
Start: 2023-07-24 | End: 2023-10-28

## 2023-07-26 ENCOUNTER — HOSPITAL ENCOUNTER (OUTPATIENT)
Age: 43
Setting detail: SPECIMEN
Discharge: HOME | End: 2023-07-26

## 2023-07-26 DIAGNOSIS — E78.5 HYPERLIPIDEMIA, UNSPECIFIED HYPERLIPIDEMIA TYPE: ICD-10-CM

## 2023-07-26 LAB
ALT SERPL-CCNC: 31 U/L (ref 5–41)
AST SERPL-CCNC: 25 U/L
CHOLEST SERPL-MCNC: 117 MG/DL
CHOLESTEROL/HDL RATIO: 2.6
HDLC SERPL-MCNC: 45 MG/DL
LDLC SERPL CALC-MCNC: 44 MG/DL (ref 0–130)
TRIGL SERPL-MCNC: 139 MG/DL

## 2023-07-27 ENCOUNTER — PATIENT MESSAGE (OUTPATIENT)
Dept: FAMILY MEDICINE CLINIC | Age: 43
End: 2023-07-27

## 2023-07-31 DIAGNOSIS — M48.062 SPINAL STENOSIS OF LUMBAR REGION WITH NEUROGENIC CLAUDICATION: ICD-10-CM

## 2023-07-31 RX ORDER — GABAPENTIN 300 MG/1
CAPSULE ORAL
Qty: 270 CAPSULE | Refills: 0 | Status: SHIPPED | OUTPATIENT
Start: 2023-07-31 | End: 2023-11-04

## 2023-07-31 NOTE — TELEPHONE ENCOUNTER
Patient calling regarding refill for gabapentin, he states the pharmacy denied the rx because the days in their system says 68? He states they said they attempted to contact the office but no response so they cancelled it? Can you resend rx as a 90 day supply?

## 2023-08-23 ENCOUNTER — HOSPITAL ENCOUNTER (EMERGENCY)
Age: 43
Discharge: HOME | End: 2023-08-23
Attending: EMERGENCY MEDICINE
Payer: MEDICARE

## 2023-08-23 ENCOUNTER — APPOINTMENT (OUTPATIENT)
Dept: GENERAL RADIOLOGY | Age: 43
End: 2023-08-23
Payer: MEDICARE

## 2023-08-23 VITALS
RESPIRATION RATE: 16 BRPM | OXYGEN SATURATION: 95 % | DIASTOLIC BLOOD PRESSURE: 85 MMHG | HEART RATE: 79 BPM | WEIGHT: 219 LBS | SYSTOLIC BLOOD PRESSURE: 134 MMHG | BODY MASS INDEX: 28.89 KG/M2 | TEMPERATURE: 98.8 F

## 2023-08-23 DIAGNOSIS — S52.202A CLOSED FRACTURE OF SHAFT OF LEFT ULNA, UNSPECIFIED FRACTURE MORPHOLOGY, INITIAL ENCOUNTER: Primary | ICD-10-CM

## 2023-08-23 PROCEDURE — 6360000002 HC RX W HCPCS: Performed by: NURSE PRACTITIONER

## 2023-08-23 PROCEDURE — 29125 APPL SHORT ARM SPLINT STATIC: CPT

## 2023-08-23 PROCEDURE — 99284 EMERGENCY DEPT VISIT MOD MDM: CPT

## 2023-08-23 PROCEDURE — 6370000000 HC RX 637 (ALT 250 FOR IP): Performed by: NURSE PRACTITIONER

## 2023-08-23 PROCEDURE — 73090 X-RAY EXAM OF FOREARM: CPT

## 2023-08-23 PROCEDURE — 96372 THER/PROPH/DIAG INJ SC/IM: CPT

## 2023-08-23 RX ORDER — HYDROMORPHONE HYDROCHLORIDE 1 MG/ML
1 INJECTION, SOLUTION INTRAMUSCULAR; INTRAVENOUS; SUBCUTANEOUS ONCE
Status: COMPLETED | OUTPATIENT
Start: 2023-08-23 | End: 2023-08-23

## 2023-08-23 RX ORDER — OXYCODONE HYDROCHLORIDE AND ACETAMINOPHEN 5; 325 MG/1; MG/1
1 TABLET ORAL EVERY 6 HOURS PRN
Qty: 20 TABLET | Refills: 0 | Status: SHIPPED | OUTPATIENT
Start: 2023-08-23 | End: 2023-08-28

## 2023-08-23 RX ORDER — OXYCODONE HYDROCHLORIDE AND ACETAMINOPHEN 5; 325 MG/1; MG/1
2 TABLET ORAL ONCE
Status: COMPLETED | OUTPATIENT
Start: 2023-08-23 | End: 2023-08-23

## 2023-08-23 RX ADMIN — OXYCODONE AND ACETAMINOPHEN 2 TABLET: 5; 325 TABLET ORAL at 19:41

## 2023-08-23 RX ADMIN — HYDROMORPHONE HYDROCHLORIDE 1 MG: 1 INJECTION, SOLUTION INTRAMUSCULAR; INTRAVENOUS; SUBCUTANEOUS at 18:08

## 2023-08-23 ASSESSMENT — ENCOUNTER SYMPTOMS
COLOR CHANGE: 0
SHORTNESS OF BREATH: 0
ABDOMINAL PAIN: 0
BACK PAIN: 0

## 2023-08-23 ASSESSMENT — PAIN DESCRIPTION - DESCRIPTORS
DESCRIPTORS: SHARP
DESCRIPTORS: ACHING;SHARP

## 2023-08-23 ASSESSMENT — PAIN DESCRIPTION - ORIENTATION
ORIENTATION: LEFT
ORIENTATION: LEFT

## 2023-08-23 ASSESSMENT — PAIN - FUNCTIONAL ASSESSMENT: PAIN_FUNCTIONAL_ASSESSMENT: 0-10

## 2023-08-23 ASSESSMENT — PAIN SCALES - GENERAL
PAINLEVEL_OUTOF10: 7
PAINLEVEL_OUTOF10: 10

## 2023-08-23 ASSESSMENT — PAIN DESCRIPTION - LOCATION
LOCATION: ARM
LOCATION: ARM

## 2023-08-23 ASSESSMENT — PAIN DESCRIPTION - FREQUENCY: FREQUENCY: CONTINUOUS

## 2023-08-23 NOTE — ED PROVIDER NOTES
dictations but occasionally words are mis-transcribed.)    YAHAIRA Najera - CNP      YAHAIRA Najera - [facility]  08/23/23 [id number]

## 2023-08-23 NOTE — DISCHARGE INSTRUCTIONS
Percocet:  WARNING:  May cause drowsiness. May impair ability to operate vehicles or machinery. Do not use in combination with alcohol.

## 2023-10-16 DIAGNOSIS — M48.062 SPINAL STENOSIS OF LUMBAR REGION WITH NEUROGENIC CLAUDICATION: ICD-10-CM

## 2023-10-16 RX ORDER — GABAPENTIN 300 MG/1
CAPSULE ORAL
Qty: 270 CAPSULE | Refills: 1 | Status: SHIPPED | OUTPATIENT
Start: 2023-10-16 | End: 2024-01-20

## 2023-11-06 DIAGNOSIS — E78.5 HYPERLIPIDEMIA, UNSPECIFIED HYPERLIPIDEMIA TYPE: ICD-10-CM

## 2023-11-06 RX ORDER — AMITRIPTYLINE HYDROCHLORIDE 10 MG/1
10 TABLET, FILM COATED ORAL NIGHTLY
Qty: 90 TABLET | Refills: 3 | Status: SHIPPED | OUTPATIENT
Start: 2023-11-06

## 2023-11-06 RX ORDER — ROSUVASTATIN CALCIUM 20 MG/1
20 TABLET, COATED ORAL DAILY
Qty: 90 TABLET | Refills: 3 | Status: SHIPPED | OUTPATIENT
Start: 2023-11-06

## 2023-11-27 SDOH — HEALTH STABILITY: PHYSICAL HEALTH: ON AVERAGE, HOW MANY DAYS PER WEEK DO YOU ENGAGE IN MODERATE TO STRENUOUS EXERCISE (LIKE A BRISK WALK)?: 0 DAYS

## 2023-11-27 ASSESSMENT — LIFESTYLE VARIABLES
HOW MANY STANDARD DRINKS CONTAINING ALCOHOL DO YOU HAVE ON A TYPICAL DAY: 1
HOW OFTEN DO YOU HAVE A DRINK CONTAINING ALCOHOL: MONTHLY OR LESS
HOW MANY STANDARD DRINKS CONTAINING ALCOHOL DO YOU HAVE ON A TYPICAL DAY: 1 OR 2
HOW OFTEN DO YOU HAVE SIX OR MORE DRINKS ON ONE OCCASION: 1
HOW OFTEN DO YOU HAVE A DRINK CONTAINING ALCOHOL: 2

## 2023-11-27 ASSESSMENT — PATIENT HEALTH QUESTIONNAIRE - PHQ9
SUM OF ALL RESPONSES TO PHQ QUESTIONS 1-9: 1
1. LITTLE INTEREST OR PLEASURE IN DOING THINGS: 1
SUM OF ALL RESPONSES TO PHQ9 QUESTIONS 1 & 2: 1
2. FEELING DOWN, DEPRESSED OR HOPELESS: 0

## 2023-12-04 ENCOUNTER — OFFICE VISIT (OUTPATIENT)
Dept: FAMILY MEDICINE CLINIC | Age: 43
End: 2023-12-04
Payer: MEDICARE

## 2023-12-04 VITALS
WEIGHT: 236.2 LBS | OXYGEN SATURATION: 96 % | BODY MASS INDEX: 31.3 KG/M2 | SYSTOLIC BLOOD PRESSURE: 114 MMHG | HEART RATE: 77 BPM | TEMPERATURE: 97.8 F | DIASTOLIC BLOOD PRESSURE: 76 MMHG | HEIGHT: 73 IN

## 2023-12-04 DIAGNOSIS — Z00.00 MEDICARE ANNUAL WELLNESS VISIT, SUBSEQUENT: Primary | ICD-10-CM

## 2023-12-04 DIAGNOSIS — G89.29 CHRONIC BILATERAL LOW BACK PAIN WITH LEFT-SIDED SCIATICA: ICD-10-CM

## 2023-12-04 DIAGNOSIS — F17.200 SMOKER: ICD-10-CM

## 2023-12-04 DIAGNOSIS — M54.42 CHRONIC BILATERAL LOW BACK PAIN WITH LEFT-SIDED SCIATICA: ICD-10-CM

## 2023-12-04 PROBLEM — L98.491 SKIN ULCER, LIMITED TO BREAKDOWN OF SKIN (HCC): Status: RESOLVED | Noted: 2021-04-08 | Resolved: 2023-12-04

## 2023-12-04 PROCEDURE — 3078F DIAST BP <80 MM HG: CPT | Performed by: NURSE PRACTITIONER

## 2023-12-04 PROCEDURE — G8484 FLU IMMUNIZE NO ADMIN: HCPCS | Performed by: NURSE PRACTITIONER

## 2023-12-04 PROCEDURE — G0439 PPPS, SUBSEQ VISIT: HCPCS | Performed by: NURSE PRACTITIONER

## 2023-12-04 PROCEDURE — 3074F SYST BP LT 130 MM HG: CPT | Performed by: NURSE PRACTITIONER

## 2023-12-04 RX ORDER — DICLOFENAC SODIUM 75 MG/1
75 TABLET, DELAYED RELEASE ORAL 2 TIMES DAILY
Qty: 60 TABLET | Refills: 3 | Status: SHIPPED | OUTPATIENT
Start: 2023-12-04

## 2023-12-04 SDOH — ECONOMIC STABILITY: INCOME INSECURITY: HOW HARD IS IT FOR YOU TO PAY FOR THE VERY BASICS LIKE FOOD, HOUSING, MEDICAL CARE, AND HEATING?: NOT HARD AT ALL

## 2023-12-04 SDOH — ECONOMIC STABILITY: FOOD INSECURITY: WITHIN THE PAST 12 MONTHS, THE FOOD YOU BOUGHT JUST DIDN'T LAST AND YOU DIDN'T HAVE MONEY TO GET MORE.: NEVER TRUE

## 2023-12-04 SDOH — ECONOMIC STABILITY: FOOD INSECURITY: WITHIN THE PAST 12 MONTHS, YOU WORRIED THAT YOUR FOOD WOULD RUN OUT BEFORE YOU GOT MONEY TO BUY MORE.: NEVER TRUE

## 2023-12-04 NOTE — PROGRESS NOTES
Visit Information    Have you changed or started any medications since your last visit including any over-the-counter medicines, vitamins, or herbal medicines? no   Have you stopped taking any of your medications? Is so, why? -  no  Are you having any side effects from any of your medications? - no    Have you seen any other physician or provider since your last visit?  no   Have you had any other diagnostic tests since your last visit?  no   Have you been seen in the emergency room and/or had an admission in a hospital since we last saw you?  no   Have you had your routine dental cleaning in the past 6 months?  no     Do you have an active MyChart account? If no, what is the barrier?   Yes    Patient Care Team:  YAHAIRA Jordan CNP as PCP - General (Certified Nurse Practitioner)  YAHAIRA Jordan CNP as PCP - Empaneled Provider  Alirio Echeverria MD as Consulting Physician (Rheumatology)  Greg Martinez MD as Consulting Physician (Orthopedic Surgery)  Cora Parry MD as Consulting Physician (Gastroenterology)  Westly Epley, DPM (Inactive) (Podiatry)  Deidra Hayward MD as Consulting Physician (Cardiology)  Tracey Eddy MD as Consulting Physician (Urology)    Medical History Review  Past Medical, Family, and Social History reviewed and  contribute to the patient presenting condition    Health Maintenance   Topic Date Due    Hepatitis B vaccine (1 of 3 - 3-dose series) Never done    COVID-19 Vaccine (1) Never done    Pneumococcal 0-64 years Vaccine (2 - PCV) 05/03/2017    DTaP/Tdap/Td vaccine (2 - Td or Tdap) 02/18/2023    Flu vaccine (1) 08/01/2023    Lipids  07/26/2024    Depression Monitoring  11/27/2024    Hepatitis C screen  Completed    HIV screen  Completed    Hepatitis A vaccine  Aged Out    Hib vaccine  Aged Out    HPV vaccine  Aged Out    Meningococcal (ACWY) vaccine  Aged Out    Varicella vaccine  Discontinued    Diabetes screen  Discontinued
Muscle pain / spasm     Prior to Visit Medications    Medication Sig Taking?  Authorizing Provider   metoprolol tartrate (LOPRESSOR) 25 MG tablet TAKE 2 TABLETS EVERY DAY Yes Von PhoenixYAHAIRA CNP   rosuvastatin (CRESTOR) 20 MG tablet Take 1 tablet by mouth daily Yes Von Phoenix, APRN - CNP   amitriptyline (ELAVIL) 10 MG tablet Take 1 tablet by mouth nightly Yes Von Phoenix, APRN - CNP   gabapentin (NEURONTIN) 300 MG capsule TAKE 1 CAPSULE THREE TIMES A Day Yes YAHAIRA German CNP   escitalopram (LEXAPRO) 10 MG tablet TAKE 1 TABLET EVERY DAY Yes Daylin Wisdom APRN - CNP   celecoxib (CELEBREX) 200 MG capsule TAKE 1 CAPSULE EVERY DAY Yes Daylin Wisdom APRN - CNP   omeprazole (PRILOSEC) 20 MG delayed release capsule TAKE 1 CAPSULE EVERY DAY Yes Daylin Wisdom APRN - CNP   Syringe, Disposable, (2-3CC SYRINGE) 3 ML MISC 1 each by Does not apply route every 14 days Yes Dayna Marcano PA-C   NEEDLE, DISP, 22 G 22G X 1\" MISC 1 each by Does not apply route every 14 days Yes Daylin Wisdom APRN - CNP   albuterol sulfate  (90 Base) MCG/ACT inhaler Inhale 2 puffs into the lungs every 6 hours as needed for Wheezing Yes Daylin Wisdom APRN - CNP   Omega-3 Fatty Acids (FISH OIL) 1000 MG CAPS  Yes Hanane Chisholm MD   SUMAtriptan (IMITREX) 100 MG tablet  Yes Hanane Chisholm MD   PANCREATIN PO Take by mouth Yes Hanane Chisholm MD   fluticasone (FLONASE) 50 MCG/ACT nasal spray 1 spray by Nasal route daily 2 sprays each nostril daily Yes Shelbi Hernandez MD   clobetasol (TEMOVATE) 0.05 % ointment Apply to rash twice daily (not face, armpit or groin) Yes Gypsy Medellin MD   valACYclovir (VALTREX) 1 g tablet take 2 tablets by mouth AT FIRST SIGN OF COLD SORE AND REPEAT IN 12 HOURS Yes Shelbi Hernandez MD   aspirin-acetaminophen-caffeine (EXCEDRIN MIGRAINE) 577-820-68 MG per tablet Take 2 tablets by mouth daily as needed Yes Provider, MD Hanane

## 2024-01-11 ENCOUNTER — OFFICE VISIT (OUTPATIENT)
Dept: FAMILY MEDICINE CLINIC | Age: 44
End: 2024-01-11
Payer: MEDICARE

## 2024-01-11 VITALS
WEIGHT: 236.6 LBS | HEART RATE: 73 BPM | SYSTOLIC BLOOD PRESSURE: 116 MMHG | HEIGHT: 73 IN | TEMPERATURE: 97.4 F | DIASTOLIC BLOOD PRESSURE: 84 MMHG | OXYGEN SATURATION: 96 % | BODY MASS INDEX: 31.36 KG/M2

## 2024-01-11 DIAGNOSIS — F41.8 DEPRESSION WITH ANXIETY: ICD-10-CM

## 2024-01-11 DIAGNOSIS — M48.062 SPINAL STENOSIS OF LUMBAR REGION WITH NEUROGENIC CLAUDICATION: ICD-10-CM

## 2024-01-11 DIAGNOSIS — B00.9 HERPES SIMPLEX: ICD-10-CM

## 2024-01-11 DIAGNOSIS — G89.29 CHRONIC BILATERAL LOW BACK PAIN WITH LEFT-SIDED SCIATICA: ICD-10-CM

## 2024-01-11 DIAGNOSIS — R06.2 WHEEZING: ICD-10-CM

## 2024-01-11 DIAGNOSIS — J02.9 SORE THROAT: ICD-10-CM

## 2024-01-11 DIAGNOSIS — K21.9 GASTROESOPHAGEAL REFLUX DISEASE WITHOUT ESOPHAGITIS: ICD-10-CM

## 2024-01-11 DIAGNOSIS — M54.42 CHRONIC BILATERAL LOW BACK PAIN WITH LEFT-SIDED SCIATICA: ICD-10-CM

## 2024-01-11 DIAGNOSIS — E78.5 HYPERLIPIDEMIA, UNSPECIFIED HYPERLIPIDEMIA TYPE: ICD-10-CM

## 2024-01-11 DIAGNOSIS — Z12.5 PROSTATE CANCER SCREENING: ICD-10-CM

## 2024-01-11 DIAGNOSIS — I10 ESSENTIAL HYPERTENSION: Primary | ICD-10-CM

## 2024-01-11 PROCEDURE — 1036F TOBACCO NON-USER: CPT | Performed by: NURSE PRACTITIONER

## 2024-01-11 PROCEDURE — 99214 OFFICE O/P EST MOD 30 MIN: CPT | Performed by: NURSE PRACTITIONER

## 2024-01-11 PROCEDURE — 3074F SYST BP LT 130 MM HG: CPT | Performed by: NURSE PRACTITIONER

## 2024-01-11 PROCEDURE — 3079F DIAST BP 80-89 MM HG: CPT | Performed by: NURSE PRACTITIONER

## 2024-01-11 PROCEDURE — G8417 CALC BMI ABV UP PARAM F/U: HCPCS | Performed by: NURSE PRACTITIONER

## 2024-01-11 PROCEDURE — G8484 FLU IMMUNIZE NO ADMIN: HCPCS | Performed by: NURSE PRACTITIONER

## 2024-01-11 PROCEDURE — G8427 DOCREV CUR MEDS BY ELIG CLIN: HCPCS | Performed by: NURSE PRACTITIONER

## 2024-01-11 RX ORDER — ALBUTEROL SULFATE 90 UG/1
2 AEROSOL, METERED RESPIRATORY (INHALATION) EVERY 6 HOURS PRN
Qty: 18 G | Refills: 0 | Status: SHIPPED | OUTPATIENT
Start: 2024-01-11

## 2024-01-11 RX ORDER — ESCITALOPRAM OXALATE 10 MG/1
10 TABLET ORAL DAILY
Qty: 90 TABLET | Refills: 1 | Status: SHIPPED | OUTPATIENT
Start: 2024-01-11

## 2024-01-11 RX ORDER — GABAPENTIN 300 MG/1
CAPSULE ORAL
Qty: 270 CAPSULE | Refills: 1 | Status: SHIPPED | OUTPATIENT
Start: 2024-01-11 | End: 2024-04-16

## 2024-01-11 RX ORDER — ROSUVASTATIN CALCIUM 20 MG/1
20 TABLET, COATED ORAL DAILY
Qty: 90 TABLET | Refills: 1 | Status: SHIPPED | OUTPATIENT
Start: 2024-01-11

## 2024-01-11 RX ORDER — DICLOFENAC SODIUM 75 MG/1
75 TABLET, DELAYED RELEASE ORAL 2 TIMES DAILY
Qty: 180 TABLET | Refills: 1 | Status: SHIPPED | OUTPATIENT
Start: 2024-01-11

## 2024-01-11 RX ORDER — VALACYCLOVIR HYDROCHLORIDE 1 G/1
TABLET, FILM COATED ORAL
Qty: 12 TABLET | Refills: 3 | Status: SHIPPED | OUTPATIENT
Start: 2024-01-11

## 2024-01-11 RX ORDER — AMITRIPTYLINE HYDROCHLORIDE 10 MG/1
10 TABLET, FILM COATED ORAL NIGHTLY
Qty: 90 TABLET | Refills: 1 | Status: SHIPPED | OUTPATIENT
Start: 2024-01-11

## 2024-01-11 RX ORDER — OMEPRAZOLE 20 MG/1
20 CAPSULE, DELAYED RELEASE ORAL DAILY
Qty: 90 CAPSULE | Refills: 1 | Status: SHIPPED | OUTPATIENT
Start: 2024-01-11

## 2024-01-11 ASSESSMENT — ENCOUNTER SYMPTOMS
DIARRHEA: 0
EYE PAIN: 0
SHORTNESS OF BREATH: 0
BACK PAIN: 0
SINUS PAIN: 0
SORE THROAT: 0
ABDOMINAL PAIN: 0
NAUSEA: 0
VOMITING: 0
COUGH: 0

## 2024-01-11 ASSESSMENT — PATIENT HEALTH QUESTIONNAIRE - PHQ9
SUM OF ALL RESPONSES TO PHQ QUESTIONS 1-9: 15
5. POOR APPETITE OR OVEREATING: 0
SUM OF ALL RESPONSES TO PHQ QUESTIONS 1-9: 15
4. FEELING TIRED OR HAVING LITTLE ENERGY: 0
6. FEELING BAD ABOUT YOURSELF - OR THAT YOU ARE A FAILURE OR HAVE LET YOURSELF OR YOUR FAMILY DOWN: 0
9. THOUGHTS THAT YOU WOULD BE BETTER OFF DEAD, OR OF HURTING YOURSELF: 0
2. FEELING DOWN, DEPRESSED OR HOPELESS: 3
7. TROUBLE CONCENTRATING ON THINGS, SUCH AS READING THE NEWSPAPER OR WATCHING TELEVISION: 3
SUM OF ALL RESPONSES TO PHQ9 QUESTIONS 1 & 2: 6
1. LITTLE INTEREST OR PLEASURE IN DOING THINGS: 3
SUM OF ALL RESPONSES TO PHQ QUESTIONS 1-9: 15
3. TROUBLE FALLING OR STAYING ASLEEP: 3
8. MOVING OR SPEAKING SO SLOWLY THAT OTHER PEOPLE COULD HAVE NOTICED. OR THE OPPOSITE, BEING SO FIGETY OR RESTLESS THAT YOU HAVE BEEN MOVING AROUND A LOT MORE THAN USUAL: 3
10. IF YOU CHECKED OFF ANY PROBLEMS, HOW DIFFICULT HAVE THESE PROBLEMS MADE IT FOR YOU TO DO YOUR WORK, TAKE CARE OF THINGS AT HOME, OR GET ALONG WITH OTHER PEOPLE: 2
SUM OF ALL RESPONSES TO PHQ QUESTIONS 1-9: 15

## 2024-01-11 NOTE — PROGRESS NOTES
[facility]  [facility]  [address] [facility] [address]  Dept: [phone]  Dept Fax: [phone]    Denver A Colpaert is a 43 y.o. male who presents today for his medicalconditions/complaints as noted below.  Denver A Colpaert is c/o of Hypertension      HPI:      43-year-old male patient presents with complaint of encounter to follow up     GERD takes omeprazole 20, stable    Hypertension takes metoprolol 25 twice daily, Bp and heart rate stable     Hyperlipidemia takes fish oil, Crestor 20 last lipids stable     Significant medical history of osteoarthritis, several back surgeries and nerve damage.  Had recent neck surgery in April per Dr. Oviedo. Takes gabapentin, diclofenac, flexeril, EMG completed showing positive cervical radiculopathy and carpal tunnel     Significant history of headaches follows with neurologist Dr. Fisher, managed with amitriptyline 10, takes Imitrex 100 prn     Significant history of depression takes Lexapro 10 mg daily.     Significant history of allergic rhinitis takes allegra,  Flonase          Past Medical History:   Diagnosis Date    Chronic back pain     DDD (degenerative disc disease)     Depression 7/17/2017    Depression with anxiety     DJD (degenerative joint disease)     knees, ankles, spine    GERD (gastroesophageal reflux disease)     GERD with stricture 3/18/2015    Hyperlipidemia, mixed     Internal derangement of shoulder 5/21/2013    Left nephrolithiasis 5/5/2017    Left shoulder strain 5/21/2013    Obesity (BMI 30-39.9) 6/27/2019    Radiculopathy     cervical, and lumbar        Current Outpatient Medications   Medication Sig Dispense Refill    omeprazole (PRILOSEC) 20 MG delayed release capsule Take 1 capsule by mouth daily 90 capsule 1    rosuvastatin (CRESTOR) 20 MG tablet Take 1 tablet by mouth daily 90 tablet 1    valACYclovir (VALTREX) 1 g tablet take 2 tablets by mouth AT FIRST SIGN OF COLD SORE AND

## 2024-01-11 NOTE — PROGRESS NOTES
Visit Information    Have you changed or started any medications since your last visit including any over-the-counter medicines, vitamins, or herbal medicines? no   Have you stopped taking any of your medications? Is so, why? -  no  Are you having any side effects from any of your medications? - no    Have you seen any other physician or provider since your last visit?  no   Have you had any other diagnostic tests since your last visit?  no   Have you been seen in the emergency room and/or had an admission in a hospital since we last saw you?  no   Have you had your routine dental cleaning in the past 6 months?  no     Do you have an active MyChart account? If no, what is the barrier?  Yes    Patient Care Team:  Hamilton Wisdom APRN - CNP as PCP - General (Certified Nurse Practitioner)  Hamilton Wisdom APRN - CNP as PCP - Empaneled Provider  Goldberger, Edward, MD as Consulting Physician (Rheumatology)  Onofre Mclaughlin MD as Consulting Physician (Orthopedic Surgery)  Daniel Patel MD as Consulting Physician (Gastroenterology)  Almita Fuentes DPM (Inactive) (Podiatry)  Fiona Dao MD as Consulting Physician (Cardiology)  Musa Navarro MD as Consulting Physician (Urology)    Medical History Review  Past Medical, Family, and Social History reviewed and  contribute to the patient presenting condition    Health Maintenance   Topic Date Due    Hepatitis B vaccine (1 of 3 - 3-dose series) Never done    COVID-19 Vaccine (1) Never done    Pneumococcal 0-64 years Vaccine (2 - PCV) 05/03/2017    DTaP/Tdap/Td vaccine (2 - Td or Tdap) 02/18/2023    Flu vaccine (1) 08/01/2023    Lipids  07/26/2024    Depression Monitoring  11/27/2024    Annual Wellness Visit (Medicare)  12/04/2024    Hepatitis C screen  Completed    HIV screen  Completed    Hepatitis A vaccine  Aged Out    Hib vaccine  Aged Out    HPV vaccine  Aged Out    Polio vaccine  Aged Out    Meningococcal (ACWY) vaccine  Aged Out

## 2024-01-11 NOTE — PATIENT INSTRUCTIONS
Patient Education        High Blood Pressure: Care Instructions  Overview     It's normal for blood pressure to go up and down throughout the day. But if it stays up, you have high blood pressure. Another name for high blood pressure is hypertension. For diagnosis, the top number may be 130 to 140 or higher. The bottom number may be 80 to 90 or higher.  Despite what a lot of people think, high blood pressure usually doesn't cause headaches or make you feel dizzy or lightheaded. It usually has no symptoms. But it does increase your risk of stroke, heart attack, and other problems. You and your doctor will talk about your risks of these problems based on your blood pressure.  Your doctor will give you a goal for your blood pressure. Your goal will be based on your health and your age.  Lifestyle changes, such as eating healthy and being active, are always important to help lower blood pressure. You might also take medicine to reach your blood pressure goal.  Follow-up care is a key part of your treatment and safety. Be sure to make and go to all appointments, and call your doctor if you are having problems. It's also a good idea to know your test results and keep a list of the medicines you take.  How can you care for yourself at home?  Medical treatment  If you stop taking your medicine, your blood pressure will go back up. You may take one or more types of medicine to lower your blood pressure. Be safe with medicines. Take your medicine exactly as prescribed. Call your doctor if you think you are having a problem with your medicine.  Talk to your doctor before you start taking aspirin every day. Aspirin can help certain people lower their risk of a heart attack or stroke. But taking aspirin isn't right for everyone, because it can cause serious bleeding.  See your doctor regularly. You may need to see the doctor more often at first or until your blood pressure comes down.  If you are taking blood pressure medicine,

## 2024-03-01 ENCOUNTER — HOSPITAL ENCOUNTER (OUTPATIENT)
Age: 44
Setting detail: SPECIMEN
Discharge: HOME | End: 2024-03-01

## 2024-03-01 DIAGNOSIS — G89.29 CHRONIC BILATERAL LOW BACK PAIN WITH LEFT-SIDED SCIATICA: ICD-10-CM

## 2024-03-01 DIAGNOSIS — B00.9 HERPES SIMPLEX: ICD-10-CM

## 2024-03-01 DIAGNOSIS — J02.9 SORE THROAT: ICD-10-CM

## 2024-03-01 DIAGNOSIS — M48.062 SPINAL STENOSIS OF LUMBAR REGION WITH NEUROGENIC CLAUDICATION: ICD-10-CM

## 2024-03-01 DIAGNOSIS — R06.2 WHEEZING: ICD-10-CM

## 2024-03-01 DIAGNOSIS — E78.5 HYPERLIPIDEMIA, UNSPECIFIED HYPERLIPIDEMIA TYPE: ICD-10-CM

## 2024-03-01 DIAGNOSIS — I10 ESSENTIAL HYPERTENSION: ICD-10-CM

## 2024-03-01 DIAGNOSIS — M54.42 CHRONIC BILATERAL LOW BACK PAIN WITH LEFT-SIDED SCIATICA: ICD-10-CM

## 2024-03-01 DIAGNOSIS — Z12.5 PROSTATE CANCER SCREENING: ICD-10-CM

## 2024-03-01 DIAGNOSIS — F41.8 DEPRESSION WITH ANXIETY: ICD-10-CM

## 2024-03-01 DIAGNOSIS — K21.9 GASTROESOPHAGEAL REFLUX DISEASE WITHOUT ESOPHAGITIS: ICD-10-CM

## 2024-03-01 LAB
ALBUMIN SERPL-MCNC: 4.8 G/DL (ref 3.5–5.2)
ALBUMIN/GLOB SERPL: 2 {RATIO} (ref 1–2.5)
ALP SERPL-CCNC: 88 U/L (ref 40–129)
ALT SERPL-CCNC: 35 U/L (ref 10–50)
ANION GAP SERPL CALCULATED.3IONS-SCNC: 12 MMOL/L (ref 9–16)
AST SERPL-CCNC: 24 U/L (ref 10–50)
BILIRUB SERPL-MCNC: 0.4 MG/DL (ref 0–1.2)
BUN SERPL-MCNC: 21 MG/DL (ref 6–20)
CALCIUM SERPL-MCNC: 9.7 MG/DL (ref 8.6–10.4)
CHLORIDE SERPL-SCNC: 105 MMOL/L (ref 98–107)
CHOLEST SERPL-MCNC: 158 MG/DL (ref 0–199)
CHOLESTEROL/HDL RATIO: 4
CO2 SERPL-SCNC: 24 MMOL/L (ref 20–31)
CREAT SERPL-MCNC: 0.9 MG/DL (ref 0.7–1.2)
ERYTHROCYTE [DISTWIDTH] IN BLOOD BY AUTOMATED COUNT: 12.7 % (ref 11.8–14.4)
GFR SERPL CREATININE-BSD FRML MDRD: >60 ML/MIN/1.73M2
GLUCOSE SERPL-MCNC: 102 MG/DL (ref 74–99)
HCT VFR BLD AUTO: 48.8 % (ref 40.7–50.3)
HDLC SERPL-MCNC: 38 MG/DL
HGB BLD-MCNC: 16.3 G/DL (ref 13–17)
LDLC SERPL CALC-MCNC: 50 MG/DL (ref 0–100)
MCH RBC QN AUTO: 31 PG (ref 25.2–33.5)
MCHC RBC AUTO-ENTMCNC: 33.4 G/DL (ref 28.4–34.8)
MCV RBC AUTO: 92.8 FL (ref 82.6–102.9)
NRBC BLD-RTO: 0 PER 100 WBC
PLATELET # BLD AUTO: 221 K/UL (ref 138–453)
PMV BLD AUTO: 10.6 FL (ref 8.1–13.5)
POTASSIUM SERPL-SCNC: 4.3 MMOL/L (ref 3.7–5.3)
PROT SERPL-MCNC: 7.3 G/DL (ref 6.6–8.7)
RBC # BLD AUTO: 5.26 M/UL (ref 4.21–5.77)
SODIUM SERPL-SCNC: 141 MMOL/L (ref 136–145)
TRIGL SERPL-MCNC: 347 MG/DL
TSH SERPL DL<=0.05 MIU/L-ACNC: 1.2 UIU/ML (ref 0.27–4.2)
VLDLC SERPL CALC-MCNC: 69 MG/DL
WBC OTHER # BLD: 7.7 K/UL (ref 3.5–11.3)

## 2024-03-03 DIAGNOSIS — R73.01 IFG (IMPAIRED FASTING GLUCOSE): Primary | ICD-10-CM

## 2024-03-04 LAB
EST. AVERAGE GLUCOSE BLD GHB EST-MCNC: 103 MG/DL
HBA1C MFR BLD: 5.2 % (ref 4–6)

## 2024-04-01 ENCOUNTER — HOSPITAL ENCOUNTER (OUTPATIENT)
Age: 44
Setting detail: SPECIMEN
Discharge: HOME | End: 2024-04-01

## 2024-04-01 ENCOUNTER — OFFICE VISIT (OUTPATIENT)
Dept: FAMILY MEDICINE CLINIC | Age: 44
End: 2024-04-01
Payer: MEDICARE

## 2024-04-01 VITALS
HEIGHT: 73 IN | DIASTOLIC BLOOD PRESSURE: 80 MMHG | TEMPERATURE: 97.8 F | HEART RATE: 76 BPM | WEIGHT: 242.8 LBS | BODY MASS INDEX: 32.18 KG/M2 | OXYGEN SATURATION: 97 % | SYSTOLIC BLOOD PRESSURE: 128 MMHG

## 2024-04-01 DIAGNOSIS — G89.29 CHRONIC BILATERAL LOW BACK PAIN WITH LEFT-SIDED SCIATICA: ICD-10-CM

## 2024-04-01 DIAGNOSIS — E78.5 HYPERLIPIDEMIA, UNSPECIFIED HYPERLIPIDEMIA TYPE: ICD-10-CM

## 2024-04-01 DIAGNOSIS — G89.29 CHRONIC BILATERAL LOW BACK PAIN WITH LEFT-SIDED SCIATICA: Primary | ICD-10-CM

## 2024-04-01 DIAGNOSIS — M54.42 CHRONIC BILATERAL LOW BACK PAIN WITH LEFT-SIDED SCIATICA: Primary | ICD-10-CM

## 2024-04-01 DIAGNOSIS — M54.42 CHRONIC BILATERAL LOW BACK PAIN WITH LEFT-SIDED SCIATICA: ICD-10-CM

## 2024-04-01 LAB
CK SERPL-CCNC: 85 U/L (ref 39–308)
CRP SERPL HS-MCNC: <3 MG/L (ref 0–5)
ERYTHROCYTE [SEDIMENTATION RATE] IN BLOOD BY PHOTOMETRIC METHOD: 6 MM/HR (ref 0–15)
RHEUMATOID FACT SER NEPH-ACNC: <10 IU/ML (ref 0–13)

## 2024-04-01 PROCEDURE — G8417 CALC BMI ABV UP PARAM F/U: HCPCS | Performed by: NURSE PRACTITIONER

## 2024-04-01 PROCEDURE — 1036F TOBACCO NON-USER: CPT | Performed by: NURSE PRACTITIONER

## 2024-04-01 PROCEDURE — 99213 OFFICE O/P EST LOW 20 MIN: CPT | Performed by: NURSE PRACTITIONER

## 2024-04-01 PROCEDURE — 3074F SYST BP LT 130 MM HG: CPT | Performed by: NURSE PRACTITIONER

## 2024-04-01 PROCEDURE — 3079F DIAST BP 80-89 MM HG: CPT | Performed by: NURSE PRACTITIONER

## 2024-04-01 PROCEDURE — G8427 DOCREV CUR MEDS BY ELIG CLIN: HCPCS | Performed by: NURSE PRACTITIONER

## 2024-04-01 RX ORDER — CYCLOBENZAPRINE HCL 10 MG
10 TABLET ORAL NIGHTLY PRN
Qty: 90 TABLET | Refills: 1 | Status: SHIPPED | OUTPATIENT
Start: 2024-04-01 | End: 2024-09-28

## 2024-04-01 RX ORDER — MELOXICAM 15 MG/1
15 TABLET ORAL DAILY
Qty: 90 TABLET | Refills: 1 | Status: SHIPPED | OUTPATIENT
Start: 2024-04-01

## 2024-04-01 ASSESSMENT — ENCOUNTER SYMPTOMS
SORE THROAT: 0
SHORTNESS OF BREATH: 0
BACK PAIN: 1
NAUSEA: 0
COUGH: 0
EYE PAIN: 0
SINUS PAIN: 0
VOMITING: 0

## 2024-04-01 NOTE — PATIENT INSTRUCTIONS
exactly as directed.  If the doctor gave you a prescription medicine for pain, take it as prescribed.  If you are not taking a prescription pain medicine, ask your doctor if you can take an over-the-counter medicine.  Take short walks several times a day. You can start with 5 to 10 minutes, 3 or 4 times a day, and work up to longer walks. Walk on level surfaces and avoid hills and stairs until your back is better.  Return to work and other activities as soon as you can. Continued rest without activity is usually not good for your back.  To prevent future back pain, do exercises to stretch and strengthen your back and stomach. Learn how to use good posture, safe lifting techniques, and proper body mechanics.  When should you call for help?   Call your doctor now or seek immediate medical care if:    You have new or worsening numbness in your legs.     You have new or worsening weakness in your legs. (This could make it hard to stand up.)     You lose control of your bladder or bowels.   Watch closely for changes in your health, and be sure to contact your doctor if:    You have a fever, lose weight, or don't feel well.     You do not get better as expected.   Where can you learn more?  Go to https://www.[employer]net/patientEd and enter I594 to learn more about \"Back Pain: Care Instructions.\"  Current as of: July 17, 2023               Content Version: 14.0  © 4443-7065 [employer].   Care instructions adapted under license by [employer]. If you have questions about a medical condition or this instruction, always ask your healthcare professional. [employer] disclaims any warranty or liability for your use of this information.

## 2024-04-01 NOTE — PROGRESS NOTES
Visit Information    Have you changed or started any medications since your last visit including any over-the-counter medicines, vitamins, or herbal medicines? no   Have you stopped taking any of your medications? Is so, why? -  no  Are you having any side effects from any of your medications? - no    Have you seen any other physician or provider since your last visit?  no   Have you had any other diagnostic tests since your last visit?  no   Have you been seen in the emergency room and/or had an admission in a hospital since we last saw you?  no   Have you had your routine dental cleaning in the past 6 months?  no     Do you have an active MyChart account? If no, what is the barrier?  Yes    Patient Care Team:  Hamilton Wisdom APRN - CNP as PCP - General (Certified Nurse Practitioner)  Hamilton Wisdom APRN - CNP as PCP - Empaneled Provider  Goldberger, Edward, MD as Consulting Physician (Rheumatology)  Onofre Mclaughlin MD as Consulting Physician (Orthopedic Surgery)  Daniel Patel MD as Consulting Physician (Gastroenterology)  Almita Fuentes DPM (Inactive) (Podiatry)  Fiona Dao MD as Consulting Physician (Cardiology)  Musa Navarro MD as Consulting Physician (Urology)    Medical History Review  Past Medical, Family, and Social History reviewed and  contribute to the patient presenting condition    Health Maintenance   Topic Date Due    Hepatitis B vaccine (1 of 3 - 3-dose series) Never done    COVID-19 Vaccine (1) Never done    Pneumococcal 0-64 years Vaccine (2 of 2 - PCV) 05/03/2017    DTaP/Tdap/Td vaccine (2 - Td or Tdap) 02/18/2023    Flu vaccine (Season Ended) 01/11/2025 (Originally 8/1/2024)    Annual Wellness Visit (Medicare)  12/04/2024    Depression Monitoring  01/11/2025    Lipids  03/01/2025    Diabetes screen  03/01/2027    Hepatitis C screen  Completed    HIV screen  Completed    Hepatitis A vaccine  Aged Out    Hib vaccine  Aged Out    HPV vaccine  Aged Out    
He is not in acute distress.     Appearance: Normal appearance. He is not toxic-appearing.   Cardiovascular:      Rate and Rhythm: Normal rate.   Pulmonary:      Effort: Pulmonary effort is normal.   Neurological:      General: No focal deficit present.      Mental Status: He is alert and oriented to person, place, and time.       /80 (Site: Right Upper Arm, Position: Sitting, Cuff Size: Large Adult)   Pulse 76   Temp 97.8 °F (36.6 °C) (Tympanic)   Ht 1.854 m (6' 1\")   Wt 110.1 kg (242 lb 12.8 oz)   SpO2 97%   BMI 32.03 kg/m²     Lab Review   Hospital Outpatient Visit on 03/01/2024   Component Date Value    WBC 03/01/2024 7.7     RBC 03/01/2024 5.26     Hemoglobin 03/01/2024 16.3     Hematocrit 03/01/2024 48.8     MCV 03/01/2024 92.8     MCH 03/01/2024 31.0     MCHC 03/01/2024 33.4     RDW 03/01/2024 12.7     Platelets 03/01/2024 221     MPV 03/01/2024 10.6     NRBC Automated 03/01/2024 0.0     Sodium 03/01/2024 141     Potassium 03/01/2024 4.3     Chloride 03/01/2024 105     CO2 03/01/2024 24     Anion Gap 03/01/2024 12     Glucose 03/01/2024 102 (H)     BUN 03/01/2024 21 (H)     Creatinine 03/01/2024 0.9     Est, Glom Filt Rate 03/01/2024 >60     Calcium 03/01/2024 9.7     Total Protein 03/01/2024 7.3     Albumin 03/01/2024 4.8     Albumin/Globulin Ratio 03/01/2024 2.0     Total Bilirubin 03/01/2024 0.4     Alkaline Phosphatase 03/01/2024 88     ALT 03/01/2024 35     AST 03/01/2024 24     Cholesterol 03/01/2024 158     HDL 03/01/2024 38 (L)     LDL Cholesterol 03/01/2024 50     Chol/HDL Ratio 03/01/2024 4.0     Triglycerides 03/01/2024 347 (H)     VLDL 03/01/2024 69     TSH 03/01/2024 1.20     Hemoglobin A1C 03/01/2024 5.2     Estimated Avg Glucose 03/01/2024 103        Assessment and Plan      1. Chronic bilateral low back pain with left-sided sciatica  -     meloxicam (MOBIC) 15 MG tablet; Take 1 tablet by mouth daily, Disp-90 tablet, R-1Normal  -     cyclobenzaprine (FLEXERIL) 10 MG tablet; Take 1

## 2024-04-02 LAB
ANA SER QL IA: NEGATIVE
DSDNA IGG SER QL IA: 0.8 IU/ML
NUCLEAR IGG SER IA-RTO: 0.2 U/ML

## 2024-04-03 LAB — HLA B27: NEGATIVE

## 2024-07-22 DIAGNOSIS — M48.062 SPINAL STENOSIS OF LUMBAR REGION WITH NEUROGENIC CLAUDICATION: ICD-10-CM

## 2024-07-22 DIAGNOSIS — F41.8 DEPRESSION WITH ANXIETY: ICD-10-CM

## 2024-07-22 DIAGNOSIS — E78.5 HYPERLIPIDEMIA, UNSPECIFIED HYPERLIPIDEMIA TYPE: ICD-10-CM

## 2024-07-22 DIAGNOSIS — K21.9 GASTROESOPHAGEAL REFLUX DISEASE WITHOUT ESOPHAGITIS: ICD-10-CM

## 2024-07-22 DIAGNOSIS — I10 ESSENTIAL HYPERTENSION: ICD-10-CM

## 2024-07-22 RX ORDER — GABAPENTIN 300 MG/1
CAPSULE ORAL
Qty: 270 CAPSULE | Refills: 1 | Status: SHIPPED | OUTPATIENT
Start: 2024-07-22 | End: 2024-07-22 | Stop reason: SDUPTHER

## 2024-07-22 RX ORDER — OMEPRAZOLE 20 MG/1
20 CAPSULE, DELAYED RELEASE ORAL DAILY
Qty: 90 CAPSULE | Refills: 1 | Status: SHIPPED | OUTPATIENT
Start: 2024-07-22

## 2024-07-22 RX ORDER — OMEPRAZOLE 20 MG/1
20 CAPSULE, DELAYED RELEASE ORAL DAILY
Qty: 90 CAPSULE | Refills: 1 | Status: SHIPPED | OUTPATIENT
Start: 2024-07-22 | End: 2024-07-22 | Stop reason: SDUPTHER

## 2024-07-22 RX ORDER — GABAPENTIN 300 MG/1
CAPSULE ORAL
Qty: 270 CAPSULE | Refills: 1 | Status: SHIPPED | OUTPATIENT
Start: 2024-07-22 | End: 2024-10-26

## 2024-07-22 RX ORDER — ROSUVASTATIN CALCIUM 20 MG/1
20 TABLET, COATED ORAL DAILY
Qty: 90 TABLET | Refills: 1 | Status: SHIPPED | OUTPATIENT
Start: 2024-07-22

## 2024-07-22 RX ORDER — ESCITALOPRAM OXALATE 10 MG/1
10 TABLET ORAL DAILY
Qty: 90 TABLET | Refills: 1 | Status: SHIPPED | OUTPATIENT
Start: 2024-07-22

## 2024-07-22 RX ORDER — ESCITALOPRAM OXALATE 10 MG/1
10 TABLET ORAL DAILY
Qty: 90 TABLET | Refills: 1 | Status: SHIPPED | OUTPATIENT
Start: 2024-07-22 | End: 2024-07-22 | Stop reason: SDUPTHER

## 2024-07-22 RX ORDER — ROSUVASTATIN CALCIUM 20 MG/1
20 TABLET, COATED ORAL DAILY
Qty: 90 TABLET | Refills: 1 | Status: SHIPPED | OUTPATIENT
Start: 2024-07-22 | End: 2024-07-22 | Stop reason: SDUPTHER

## 2024-09-09 DIAGNOSIS — F41.8 DEPRESSION WITH ANXIETY: ICD-10-CM

## 2024-09-09 RX ORDER — AMITRIPTYLINE HYDROCHLORIDE 10 MG/1
10 TABLET ORAL NIGHTLY
Qty: 90 TABLET | Refills: 1 | Status: SHIPPED | OUTPATIENT
Start: 2024-09-09

## 2024-12-02 ENCOUNTER — OFFICE VISIT (OUTPATIENT)
Dept: FAMILY MEDICINE CLINIC | Age: 44
End: 2024-12-02
Payer: MEDICARE

## 2024-12-02 VITALS
SYSTOLIC BLOOD PRESSURE: 122 MMHG | BODY MASS INDEX: 32.02 KG/M2 | HEIGHT: 73 IN | WEIGHT: 241.6 LBS | TEMPERATURE: 98 F | HEART RATE: 79 BPM | DIASTOLIC BLOOD PRESSURE: 82 MMHG | OXYGEN SATURATION: 98 %

## 2024-12-02 DIAGNOSIS — J06.9 ACUTE URI: Primary | ICD-10-CM

## 2024-12-02 PROCEDURE — 3079F DIAST BP 80-89 MM HG: CPT | Performed by: NURSE PRACTITIONER

## 2024-12-02 PROCEDURE — 1036F TOBACCO NON-USER: CPT | Performed by: NURSE PRACTITIONER

## 2024-12-02 PROCEDURE — G8427 DOCREV CUR MEDS BY ELIG CLIN: HCPCS | Performed by: NURSE PRACTITIONER

## 2024-12-02 PROCEDURE — G8417 CALC BMI ABV UP PARAM F/U: HCPCS | Performed by: NURSE PRACTITIONER

## 2024-12-02 PROCEDURE — 99213 OFFICE O/P EST LOW 20 MIN: CPT | Performed by: NURSE PRACTITIONER

## 2024-12-02 PROCEDURE — G8484 FLU IMMUNIZE NO ADMIN: HCPCS | Performed by: NURSE PRACTITIONER

## 2024-12-02 PROCEDURE — 3074F SYST BP LT 130 MM HG: CPT | Performed by: NURSE PRACTITIONER

## 2024-12-02 RX ORDER — PREDNISONE 20 MG/1
40 TABLET ORAL DAILY
Qty: 10 TABLET | Refills: 0 | Status: SHIPPED | OUTPATIENT
Start: 2024-12-02 | End: 2024-12-07

## 2024-12-02 RX ORDER — BENZONATATE 200 MG/1
200 CAPSULE ORAL 3 TIMES DAILY PRN
Qty: 30 CAPSULE | Refills: 0 | Status: SHIPPED | OUTPATIENT
Start: 2024-12-02 | End: 2024-12-09

## 2024-12-02 RX ORDER — DOXYCYCLINE HYCLATE 100 MG
100 TABLET ORAL 2 TIMES DAILY
Qty: 14 TABLET | Refills: 0 | Status: SHIPPED | OUTPATIENT
Start: 2024-12-02 | End: 2024-12-09

## 2024-12-02 ASSESSMENT — ENCOUNTER SYMPTOMS
RHINORRHEA: 1
COUGH: 1
VOMITING: 0
DIARRHEA: 0
SHORTNESS OF BREATH: 0
SORE THROAT: 1

## 2024-12-02 NOTE — PROGRESS NOTES
are negative.      :Objective     Physical Exam  Vitals and nursing note reviewed.   Constitutional:       General: He is not in acute distress.     Appearance: He is not toxic-appearing.   HENT:      Right Ear: A middle ear effusion is present.      Left Ear: Tympanic membrane normal.      Nose: Congestion and rhinorrhea present.      Right Sinus: Maxillary sinus tenderness and frontal sinus tenderness present.      Left Sinus: Maxillary sinus tenderness and frontal sinus tenderness present.   Cardiovascular:      Rate and Rhythm: Normal rate.   Pulmonary:      Effort: Pulmonary effort is normal.      Breath sounds: Normal breath sounds.   Neurological:      Mental Status: He is alert.       /82 (Site: Right Upper Arm, Position: Sitting, Cuff Size: Large Adult)   Pulse 79   Temp 98 °F (36.7 °C) (Tympanic)   Ht 1.854 m (6' 1\")   Wt 109.6 kg (241 lb 9.6 oz)   SpO2 98%   BMI 31.88 kg/m²     Lab Review   No visits with results within 2 Month(s) from this visit.   Latest known visit with results is:   Hospital Outpatient Visit on 04/01/2024   Component Date Value    Sed Rate, Automated 04/01/2024 6     CRP 04/01/2024 <3.0     ENIO 04/01/2024 NEGATIVE     KIMBERLY Antibodies Screen 04/01/2024 0.2     Anti ds DNA 04/01/2024 0.8     Rheumatoid Factor 04/01/2024 <10     HLA B27 04/01/2024 Negative     Total CK 04/01/2024 85        Assessment and Plan   Assessment & Plan   1. Acute URI  -     doxycycline hyclate (VIBRA-TABS) 100 MG tablet; Take 1 tablet by mouth 2 times daily for 7 days, Disp-14 tablet, R-0Normal  -     predniSONE (DELTASONE) 20 MG tablet; Take 2 tablets by mouth daily for 5 days, Disp-10 tablet, R-0Normal  -     benzonatate (TESSALON) 200 MG capsule; Take 1 capsule by mouth 3 times daily as needed for Cough, Disp-30 capsule, R-0Normal    Based on the duration and severity of the symptoms-- I will treat this as bacterial at this time.  Patient instructed to complete antibiotic prescription

## 2024-12-02 NOTE — PATIENT INSTRUCTIONS
because you feel better. You need to take the full course of antibiotics.  Be careful when taking over-the-counter cold or flu medicines and Tylenol at the same time. Many of these medicines have acetaminophen, which is Tylenol. Read the labels to make sure that you are not taking more than the recommended dose. Too much acetaminophen (Tylenol) can be harmful.  Try a steroid nasal spray. It may help with your symptoms.  Breathe warm, moist air. You can use a steamy shower, a hot bath, or a sink filled with hot water. Avoid cold, dry air. Using a humidifier in your home may help. Follow the directions for cleaning the machine.  When should you call for help?   Call your doctor now or seek immediate medical care if:    You have new or worse swelling, redness, or pain in your face or around one or both of your eyes.     You have double vision or a change in your vision.     You have a high fever.     You have a severe headache and a stiff neck.     You have mental changes, such as feeling confused or much less alert.   Watch closely for changes in your health, and be sure to contact your doctor if:    You are not getting better as expected.   Where can you learn more?  Go to https://www.[employer].net/patientEd and enter I933 to learn more about \"Acute Sinusitis: Care Instructions.\"  Current as of: September 27, 2023  Content Version: 14.2  © 2024 [employer].   Care instructions adapted under license by [employer]. If you have questions about a medical condition or this instruction, always ask your healthcare professional. [employer] disclaims any warranty or liability for your use of this information.

## 2025-01-06 SDOH — HEALTH STABILITY: PHYSICAL HEALTH: ON AVERAGE, HOW MANY MINUTES DO YOU ENGAGE IN EXERCISE AT THIS LEVEL?: 0 MIN

## 2025-01-06 SDOH — HEALTH STABILITY: PHYSICAL HEALTH: ON AVERAGE, HOW MANY DAYS PER WEEK DO YOU ENGAGE IN MODERATE TO STRENUOUS EXERCISE (LIKE A BRISK WALK)?: 0 DAYS

## 2025-01-06 ASSESSMENT — PATIENT HEALTH QUESTIONNAIRE - PHQ9
SUM OF ALL RESPONSES TO PHQ9 QUESTIONS 1 & 2: 2
SUM OF ALL RESPONSES TO PHQ QUESTIONS 1-9: 2
2. FEELING DOWN, DEPRESSED OR HOPELESS: SEVERAL DAYS
1. LITTLE INTEREST OR PLEASURE IN DOING THINGS: SEVERAL DAYS
SUM OF ALL RESPONSES TO PHQ QUESTIONS 1-9: 2

## 2025-01-06 ASSESSMENT — LIFESTYLE VARIABLES
HOW OFTEN DO YOU HAVE A DRINK CONTAINING ALCOHOL: MONTHLY OR LESS
HOW MANY STANDARD DRINKS CONTAINING ALCOHOL DO YOU HAVE ON A TYPICAL DAY: 1
HOW OFTEN DO YOU HAVE A DRINK CONTAINING ALCOHOL: 2
HOW MANY STANDARD DRINKS CONTAINING ALCOHOL DO YOU HAVE ON A TYPICAL DAY: 1 OR 2
HOW OFTEN DO YOU HAVE SIX OR MORE DRINKS ON ONE OCCASION: 1

## 2025-01-13 ENCOUNTER — OFFICE VISIT (OUTPATIENT)
Dept: FAMILY MEDICINE CLINIC | Age: 45
End: 2025-01-13
Payer: COMMERCIAL

## 2025-01-13 VITALS
SYSTOLIC BLOOD PRESSURE: 120 MMHG | TEMPERATURE: 97.2 F | HEART RATE: 77 BPM | HEIGHT: 73 IN | WEIGHT: 246.8 LBS | DIASTOLIC BLOOD PRESSURE: 80 MMHG | BODY MASS INDEX: 32.71 KG/M2 | OXYGEN SATURATION: 95 %

## 2025-01-13 DIAGNOSIS — M25.511 ACUTE PAIN OF RIGHT SHOULDER: ICD-10-CM

## 2025-01-13 DIAGNOSIS — G89.29 CHRONIC BILATERAL LOW BACK PAIN WITH LEFT-SIDED SCIATICA: ICD-10-CM

## 2025-01-13 DIAGNOSIS — J30.2 SEASONAL ALLERGIC RHINITIS, UNSPECIFIED TRIGGER: ICD-10-CM

## 2025-01-13 DIAGNOSIS — M54.42 CHRONIC BILATERAL LOW BACK PAIN WITH LEFT-SIDED SCIATICA: ICD-10-CM

## 2025-01-13 DIAGNOSIS — K21.9 GASTROESOPHAGEAL REFLUX DISEASE WITHOUT ESOPHAGITIS: ICD-10-CM

## 2025-01-13 DIAGNOSIS — Z13.1 DIABETES MELLITUS SCREENING: ICD-10-CM

## 2025-01-13 DIAGNOSIS — M48.062 SPINAL STENOSIS OF LUMBAR REGION WITH NEUROGENIC CLAUDICATION: ICD-10-CM

## 2025-01-13 DIAGNOSIS — Z00.00 MEDICARE ANNUAL WELLNESS VISIT, SUBSEQUENT: Primary | ICD-10-CM

## 2025-01-13 DIAGNOSIS — M25.551 RIGHT HIP PAIN: ICD-10-CM

## 2025-01-13 DIAGNOSIS — E78.5 HYPERLIPIDEMIA, UNSPECIFIED HYPERLIPIDEMIA TYPE: ICD-10-CM

## 2025-01-13 DIAGNOSIS — G43.909 MIGRAINE WITHOUT STATUS MIGRAINOSUS, NOT INTRACTABLE, UNSPECIFIED MIGRAINE TYPE: ICD-10-CM

## 2025-01-13 DIAGNOSIS — F41.8 DEPRESSION WITH ANXIETY: ICD-10-CM

## 2025-01-13 DIAGNOSIS — R73.01 IFG (IMPAIRED FASTING GLUCOSE): ICD-10-CM

## 2025-01-13 DIAGNOSIS — I10 ESSENTIAL HYPERTENSION: ICD-10-CM

## 2025-01-13 DIAGNOSIS — Z12.5 PROSTATE CANCER SCREENING: ICD-10-CM

## 2025-01-13 PROCEDURE — G0439 PPPS, SUBSEQ VISIT: HCPCS | Performed by: NURSE PRACTITIONER

## 2025-01-13 PROCEDURE — 3074F SYST BP LT 130 MM HG: CPT | Performed by: NURSE PRACTITIONER

## 2025-01-13 PROCEDURE — 3079F DIAST BP 80-89 MM HG: CPT | Performed by: NURSE PRACTITIONER

## 2025-01-13 RX ORDER — MELOXICAM 15 MG/1
15 TABLET ORAL DAILY
Qty: 90 TABLET | Refills: 1 | Status: SHIPPED | OUTPATIENT
Start: 2025-01-13

## 2025-01-13 RX ORDER — ESCITALOPRAM OXALATE 10 MG/1
10 TABLET ORAL DAILY
Qty: 90 TABLET | Refills: 1 | Status: SHIPPED | OUTPATIENT
Start: 2025-01-13

## 2025-01-13 RX ORDER — AMITRIPTYLINE HYDROCHLORIDE 10 MG/1
10 TABLET ORAL NIGHTLY
Qty: 90 TABLET | Refills: 1 | Status: SHIPPED | OUTPATIENT
Start: 2025-01-13

## 2025-01-13 RX ORDER — METOPROLOL TARTRATE 25 MG/1
TABLET, FILM COATED ORAL
Qty: 180 TABLET | Refills: 1 | Status: SHIPPED | OUTPATIENT
Start: 2025-01-13

## 2025-01-13 RX ORDER — FLUTICASONE PROPIONATE 50 MCG
1 SPRAY, SUSPENSION (ML) NASAL
Qty: 3 EACH | Refills: 3 | Status: SHIPPED | OUTPATIENT
Start: 2025-01-13 | End: 2025-01-14 | Stop reason: SDUPTHER

## 2025-01-13 RX ORDER — ROSUVASTATIN CALCIUM 20 MG/1
20 TABLET, COATED ORAL DAILY
Qty: 90 TABLET | Refills: 1 | Status: SHIPPED | OUTPATIENT
Start: 2025-01-13

## 2025-01-13 SDOH — ECONOMIC STABILITY: FOOD INSECURITY: WITHIN THE PAST 12 MONTHS, YOU WORRIED THAT YOUR FOOD WOULD RUN OUT BEFORE YOU GOT MONEY TO BUY MORE.: NEVER TRUE

## 2025-01-13 SDOH — ECONOMIC STABILITY: FOOD INSECURITY: WITHIN THE PAST 12 MONTHS, THE FOOD YOU BOUGHT JUST DIDN'T LAST AND YOU DIDN'T HAVE MONEY TO GET MORE.: NEVER TRUE

## 2025-01-13 SDOH — ECONOMIC STABILITY: TRANSPORTATION INSECURITY
IN THE PAST 12 MONTHS, HAS THE LACK OF TRANSPORTATION KEPT YOU FROM MEDICAL APPOINTMENTS OR FROM GETTING MEDICATIONS?: NO

## 2025-01-13 SDOH — ECONOMIC STABILITY: INCOME INSECURITY: IN THE LAST 12 MONTHS, WAS THERE A TIME WHEN YOU WERE NOT ABLE TO PAY THE MORTGAGE OR RENT ON TIME?: NO

## 2025-01-13 NOTE — PROGRESS NOTES
Medicare Annual Wellness Visit    [place] A Colpaert is here for Medicare AWV    Assessment & Plan   Medicare annual wellness visit, subsequent     No follow-ups on file.     Subjective       45-year-old male patient presents with complaint of encounter to follow up     GERD takes omeprazole 20, stable     Hypertension takes metoprolol 25 twice daily, Bp and heart rate stable     Hyperlipidemia takes fish oil, Crestor 20 last lipids stable -due for lipid monitor     Significant medical history of osteoarthritis, several back surgeries and nerve damage.  Had recent neck surgery in April per Dr. Oviedo. Takes gabapentin, diclofenac, flexeril, EMG completed showing positive cervical radiculopathy and carpal tunnel -patient is having pain to the right hip radiating from the right lower back.  Will update x-rays     Significant history of headaches follows with neurologist Dr. Fisher, managed with amitriptyline 10, takes Imitrex 100 prn     Significant history of depression takes Lexapro 10 mg daily.     Significant history of allergic rhinitis takes allegra,  Flonase    Right shoulder pain, no history of acute injury or trauma.  Has had 2 prior shoulder surgeries.  Reports pain with certain positions and movements, feels a clicking and popping sensation.  Reports that he was previously recommended to have a shoulder replacement but has held off          Patient's complete Health Risk Assessment and screening values have been reviewed and are found in Flowshe[employer]. The following problems were reviewed today and where indicated follow up appointments were made and/or referrals ordered.    Positive Risk Factor Screenings with Interventions:    Fall Risk:  Do you feel unsteady or are you worried about falling? : no  2 or more falls in past year?: (!) yes  Fall with injury in past year?: no     Interventions:    Patient comments: xrays pending to arm, hip, leg    Cognitive:   Clock Drawing Test (CDT): Normal        Total Score

## 2025-01-13 NOTE — PROGRESS NOTES
Visit Information    Have you changed or started any medications since your last visit including any over-the-counter medicines, vitamins, or herbal medicines? no   Have you stopped taking any of your medications? Is so, why? -  no  Are you having any side effects from any of your medications? - no    Have you seen any other physician or provider since your last visit?  no   Have you had any other diagnostic tests since your last visit?  no   Have you been seen in the emergency room and/or had an admission in a hospital since we last saw you?  no   Have you had your routine dental cleaning in the past 6 months?  no     Do you have an active MyChart account? If no, what is the barrier?  Yes    Patient Care Team:  Hamilton Wisdom APRN - CNP as PCP - General (Certified Nurse Practitioner)  Hamilton Wisdom APRN - CNP as PCP - Empaneled Provider  Goldberger, Edward, MD as Consulting Physician (Rheumatology)  Onofre Mclaughlin MD as Consulting Physician (Orthopedic Surgery)  Daniel Patel MD as Consulting Physician (Gastroenterology)  Almita Fuentes DPM (Inactive) (Podiatry)  Fiona Dao MD as Consulting Physician (Cardiology)  Musa Navarro MD as Consulting Physician (Urology)    Medical History Review  Past Medical, Family, and Social History reviewed and  contribute to the patient presenting condition    Health Maintenance   Topic Date Due    Hepatitis B vaccine (1 of 3 - 19+ 3-dose series) Never done    Pneumococcal 0-64 years Vaccine (2 of 2 - PCV) 05/03/2017    DTaP/Tdap/Td vaccine (2 - Td or Tdap) 02/18/2023    COVID-19 Vaccine (1 - 2023-24 season) Never done    Annual Wellness Visit (Medicare)  12/04/2024    Flu vaccine (1) 12/02/2025 (Originally 8/1/2024)    Lipids  03/01/2025    Colorectal Cancer Screen  03/05/2025    Depression Monitoring  01/06/2026    Diabetes screen  03/01/2027    Hepatitis C screen  Completed    HIV screen  Completed    Hepatitis A vaccine  Aged Out    Hib

## 2025-01-14 DIAGNOSIS — J30.2 SEASONAL ALLERGIC RHINITIS, UNSPECIFIED TRIGGER: ICD-10-CM

## 2025-01-14 RX ORDER — FLUTICASONE PROPIONATE 50 MCG
2 SPRAY, SUSPENSION (ML) NASAL DAILY PRN
Qty: 3 EACH | Refills: 3 | Status: SHIPPED | OUTPATIENT
Start: 2025-01-14

## 2025-03-18 ENCOUNTER — HOSPITAL ENCOUNTER (OUTPATIENT)
Age: 45
Discharge: HOME | End: 2025-03-18
Payer: COMMERCIAL

## 2025-03-18 ENCOUNTER — HOSPITAL ENCOUNTER (OUTPATIENT)
Age: 45
Discharge: HOME | End: 2025-03-20
Payer: COMMERCIAL

## 2025-03-18 ENCOUNTER — HOSPITAL ENCOUNTER (OUTPATIENT)
Dept: GENERAL RADIOLOGY | Age: 45
Discharge: HOME | End: 2025-03-20
Payer: COMMERCIAL

## 2025-03-18 DIAGNOSIS — G89.29 CHRONIC BILATERAL LOW BACK PAIN WITH LEFT-SIDED SCIATICA: ICD-10-CM

## 2025-03-18 DIAGNOSIS — M54.42 CHRONIC BILATERAL LOW BACK PAIN WITH LEFT-SIDED SCIATICA: ICD-10-CM

## 2025-03-18 DIAGNOSIS — Z13.1 DIABETES MELLITUS SCREENING: ICD-10-CM

## 2025-03-18 DIAGNOSIS — E78.5 HYPERLIPIDEMIA, UNSPECIFIED HYPERLIPIDEMIA TYPE: ICD-10-CM

## 2025-03-18 DIAGNOSIS — Z12.5 PROSTATE CANCER SCREENING: ICD-10-CM

## 2025-03-18 DIAGNOSIS — R73.01 IFG (IMPAIRED FASTING GLUCOSE): ICD-10-CM

## 2025-03-18 DIAGNOSIS — M25.511 ACUTE PAIN OF RIGHT SHOULDER: ICD-10-CM

## 2025-03-18 DIAGNOSIS — M25.551 RIGHT HIP PAIN: ICD-10-CM

## 2025-03-18 DIAGNOSIS — G43.909 MIGRAINE WITHOUT STATUS MIGRAINOSUS, NOT INTRACTABLE, UNSPECIFIED MIGRAINE TYPE: ICD-10-CM

## 2025-03-18 DIAGNOSIS — Z00.00 MEDICARE ANNUAL WELLNESS VISIT, SUBSEQUENT: ICD-10-CM

## 2025-03-18 DIAGNOSIS — K21.9 GASTROESOPHAGEAL REFLUX DISEASE WITHOUT ESOPHAGITIS: ICD-10-CM

## 2025-03-18 DIAGNOSIS — I10 ESSENTIAL HYPERTENSION: ICD-10-CM

## 2025-03-18 DIAGNOSIS — F41.8 DEPRESSION WITH ANXIETY: ICD-10-CM

## 2025-03-18 DIAGNOSIS — J30.2 SEASONAL ALLERGIC RHINITIS, UNSPECIFIED TRIGGER: ICD-10-CM

## 2025-03-18 DIAGNOSIS — M48.062 SPINAL STENOSIS OF LUMBAR REGION WITH NEUROGENIC CLAUDICATION: ICD-10-CM

## 2025-03-18 LAB
ALBUMIN SERPL-MCNC: 4.5 G/DL (ref 3.5–5.2)
ALBUMIN/GLOB SERPL: 1.7 {RATIO} (ref 1–2.5)
ALP SERPL-CCNC: 75 U/L (ref 40–129)
ALT SERPL-CCNC: 36 U/L (ref 10–50)
ANION GAP SERPL CALCULATED.3IONS-SCNC: 12 MMOL/L (ref 9–16)
AST SERPL-CCNC: 25 U/L (ref 10–50)
BILIRUB SERPL-MCNC: 0.9 MG/DL (ref 0–1.2)
BUN SERPL-MCNC: 18 MG/DL (ref 6–20)
CALCIUM SERPL-MCNC: 9.7 MG/DL (ref 8.6–10.4)
CHLORIDE SERPL-SCNC: 104 MMOL/L (ref 98–107)
CHOLEST SERPL-MCNC: 150 MG/DL (ref 0–199)
CHOLESTEROL/HDL RATIO: 4.3
CO2 SERPL-SCNC: 24 MMOL/L (ref 20–31)
CREAT SERPL-MCNC: 0.8 MG/DL (ref 0.7–1.2)
ERYTHROCYTE [DISTWIDTH] IN BLOOD BY AUTOMATED COUNT: 12.1 % (ref 11.8–14.4)
EST. AVERAGE GLUCOSE BLD GHB EST-MCNC: 103 MG/DL
GFR, ESTIMATED: >90 ML/MIN/1.73M2
GLUCOSE SERPL-MCNC: 106 MG/DL (ref 74–99)
HBA1C MFR BLD: 5.2 % (ref 4–6)
HCT VFR BLD AUTO: 43.9 % (ref 40.7–50.3)
HDLC SERPL-MCNC: 35 MG/DL
HGB BLD-MCNC: 15.2 G/DL (ref 13–17)
LDLC SERPL CALC-MCNC: ABNORMAL MG/DL (ref 0–100)
LDLC SERPL DIRECT ASSAY-MCNC: 50 MG/DL
MCH RBC QN AUTO: 29.9 PG (ref 25.2–33.5)
MCHC RBC AUTO-ENTMCNC: 34.6 G/DL (ref 28.4–34.8)
MCV RBC AUTO: 86.4 FL (ref 82.6–102.9)
NRBC BLD-RTO: 0 PER 100 WBC
PLATELET # BLD AUTO: 188 K/UL (ref 138–453)
PMV BLD AUTO: 10.5 FL (ref 8.1–13.5)
POTASSIUM SERPL-SCNC: 3.8 MMOL/L (ref 3.7–5.3)
PROT SERPL-MCNC: 7.1 G/DL (ref 6.6–8.7)
PSA SERPL-MCNC: 0.8 NG/ML (ref 0–4)
RBC # BLD AUTO: 5.08 M/UL (ref 4.21–5.77)
SODIUM SERPL-SCNC: 140 MMOL/L (ref 136–145)
TRIGL SERPL-MCNC: 478 MG/DL
VLDLC SERPL CALC-MCNC: ABNORMAL MG/DL (ref 1–30)
WBC OTHER # BLD: 8.3 K/UL (ref 3.5–11.3)

## 2025-03-18 PROCEDURE — 80053 COMPREHEN METABOLIC PANEL: CPT

## 2025-03-18 PROCEDURE — G0103 PSA SCREENING: HCPCS

## 2025-03-18 PROCEDURE — 80061 LIPID PANEL: CPT

## 2025-03-18 PROCEDURE — 73502 X-RAY EXAM HIP UNI 2-3 VIEWS: CPT

## 2025-03-18 PROCEDURE — 83721 ASSAY OF BLOOD LIPOPROTEIN: CPT

## 2025-03-18 PROCEDURE — 72100 X-RAY EXAM L-S SPINE 2/3 VWS: CPT

## 2025-03-18 PROCEDURE — 36415 COLL VENOUS BLD VENIPUNCTURE: CPT

## 2025-03-18 PROCEDURE — 83036 HEMOGLOBIN GLYCOSYLATED A1C: CPT

## 2025-03-18 PROCEDURE — 73030 X-RAY EXAM OF SHOULDER: CPT

## 2025-03-18 PROCEDURE — 85027 COMPLETE CBC AUTOMATED: CPT

## 2025-03-19 ENCOUNTER — RESULTS FOLLOW-UP (OUTPATIENT)
Dept: GENERAL RADIOLOGY | Age: 45
End: 2025-03-19

## 2025-03-19 ENCOUNTER — RESULTS FOLLOW-UP (OUTPATIENT)
Dept: FAMILY MEDICINE CLINIC | Age: 45
End: 2025-03-19

## 2025-03-19 DIAGNOSIS — G89.29 CHRONIC RIGHT SHOULDER PAIN: Primary | ICD-10-CM

## 2025-03-19 DIAGNOSIS — M25.511 CHRONIC RIGHT SHOULDER PAIN: Primary | ICD-10-CM

## 2025-03-19 DIAGNOSIS — M48.062 SPINAL STENOSIS OF LUMBAR REGION WITH NEUROGENIC CLAUDICATION: ICD-10-CM

## 2025-03-19 DIAGNOSIS — E78.1 HYPERTRIGLYCERIDEMIA: ICD-10-CM

## 2025-03-19 RX ORDER — GABAPENTIN 300 MG/1
CAPSULE ORAL
Qty: 270 CAPSULE | Refills: 1 | Status: SHIPPED | OUTPATIENT
Start: 2025-03-19 | End: 2025-06-23

## 2025-03-19 RX ORDER — FENOFIBRATE 67 MG/1
67 CAPSULE ORAL
Qty: 90 CAPSULE | Refills: 1 | Status: SHIPPED | OUTPATIENT
Start: 2025-03-19 | End: 2025-09-15

## 2025-06-09 DIAGNOSIS — F41.8 DEPRESSION WITH ANXIETY: ICD-10-CM

## 2025-06-09 DIAGNOSIS — E78.5 HYPERLIPIDEMIA, UNSPECIFIED HYPERLIPIDEMIA TYPE: ICD-10-CM

## 2025-06-09 DIAGNOSIS — G89.29 CHRONIC BILATERAL LOW BACK PAIN WITH LEFT-SIDED SCIATICA: ICD-10-CM

## 2025-06-09 DIAGNOSIS — M54.42 CHRONIC BILATERAL LOW BACK PAIN WITH LEFT-SIDED SCIATICA: ICD-10-CM

## 2025-06-09 DIAGNOSIS — K21.9 GASTROESOPHAGEAL REFLUX DISEASE WITHOUT ESOPHAGITIS: ICD-10-CM

## 2025-06-09 DIAGNOSIS — I10 ESSENTIAL HYPERTENSION: ICD-10-CM

## 2025-06-09 RX ORDER — ESCITALOPRAM OXALATE 10 MG/1
10 TABLET ORAL DAILY
Qty: 90 TABLET | Refills: 1 | Status: SHIPPED | OUTPATIENT
Start: 2025-06-09

## 2025-06-09 RX ORDER — OMEPRAZOLE 20 MG/1
20 CAPSULE, DELAYED RELEASE ORAL DAILY
Qty: 90 CAPSULE | Refills: 1 | Status: SHIPPED | OUTPATIENT
Start: 2025-06-09

## 2025-06-09 RX ORDER — AMITRIPTYLINE HYDROCHLORIDE 10 MG/1
10 TABLET ORAL NIGHTLY
Qty: 90 TABLET | Refills: 1 | Status: SHIPPED | OUTPATIENT
Start: 2025-06-09

## 2025-06-09 RX ORDER — ROSUVASTATIN CALCIUM 20 MG/1
20 TABLET, COATED ORAL DAILY
Qty: 90 TABLET | Refills: 1 | Status: SHIPPED | OUTPATIENT
Start: 2025-06-09

## 2025-06-09 RX ORDER — MELOXICAM 15 MG/1
15 TABLET ORAL DAILY
Qty: 90 TABLET | Refills: 1 | Status: SHIPPED | OUTPATIENT
Start: 2025-06-09

## 2025-06-09 RX ORDER — METOPROLOL TARTRATE 25 MG/1
TABLET, FILM COATED ORAL
Qty: 180 TABLET | Refills: 1 | Status: SHIPPED | OUTPATIENT
Start: 2025-06-09

## 2025-06-16 ENCOUNTER — HOSPITAL ENCOUNTER (OUTPATIENT)
Dept: MRI IMAGING | Age: 45
Discharge: HOME | End: 2025-06-18
Payer: COMMERCIAL

## 2025-06-16 DIAGNOSIS — G89.29 CHRONIC RIGHT SHOULDER PAIN: ICD-10-CM

## 2025-06-16 DIAGNOSIS — M25.511 CHRONIC RIGHT SHOULDER PAIN: ICD-10-CM

## 2025-06-16 PROCEDURE — 73221 MRI JOINT UPR EXTREM W/O DYE: CPT

## 2025-06-23 ENCOUNTER — RESULTS FOLLOW-UP (OUTPATIENT)
Dept: FAMILY MEDICINE CLINIC | Age: 45
End: 2025-06-23

## 2025-06-30 ENCOUNTER — HOSPITAL ENCOUNTER (OUTPATIENT)
Age: 45
Setting detail: SPECIMEN
Discharge: HOME | End: 2025-06-30

## 2025-06-30 DIAGNOSIS — E78.1 HYPERTRIGLYCERIDEMIA: ICD-10-CM

## 2025-06-30 LAB
CHOLEST SERPL-MCNC: 159 MG/DL (ref 0–199)
CHOLESTEROL/HDL RATIO: 5.1
HDLC SERPL-MCNC: 31 MG/DL
LDLC SERPL CALC-MCNC: ABNORMAL MG/DL (ref 0–100)
LDLC SERPL DIRECT ASSAY-MCNC: 66 MG/DL
TRIGL SERPL-MCNC: 415 MG/DL
VLDLC SERPL CALC-MCNC: ABNORMAL MG/DL (ref 1–30)

## 2025-08-21 DIAGNOSIS — M48.062 SPINAL STENOSIS OF LUMBAR REGION WITH NEUROGENIC CLAUDICATION: ICD-10-CM

## 2025-08-21 RX ORDER — GABAPENTIN 300 MG/1
CAPSULE ORAL
Qty: 270 CAPSULE | Refills: 1 | Status: SHIPPED | OUTPATIENT
Start: 2025-08-21 | End: 2025-11-25

## 2025-08-25 ENCOUNTER — OFFICE VISIT (OUTPATIENT)
Dept: FAMILY MEDICINE CLINIC | Age: 45
End: 2025-08-25
Payer: COMMERCIAL

## 2025-08-25 VITALS
HEIGHT: 73 IN | BODY MASS INDEX: 32.18 KG/M2 | OXYGEN SATURATION: 97 % | TEMPERATURE: 97.5 F | WEIGHT: 242.8 LBS | SYSTOLIC BLOOD PRESSURE: 130 MMHG | DIASTOLIC BLOOD PRESSURE: 84 MMHG | HEART RATE: 69 BPM

## 2025-08-25 DIAGNOSIS — Z13.1 DIABETES MELLITUS SCREENING: ICD-10-CM

## 2025-08-25 DIAGNOSIS — F41.8 DEPRESSION WITH ANXIETY: ICD-10-CM

## 2025-08-25 DIAGNOSIS — G43.909 MIGRAINE WITHOUT STATUS MIGRAINOSUS, NOT INTRACTABLE, UNSPECIFIED MIGRAINE TYPE: ICD-10-CM

## 2025-08-25 DIAGNOSIS — Z12.11 COLON CANCER SCREENING: Primary | ICD-10-CM

## 2025-08-25 DIAGNOSIS — E78.5 HYPERLIPIDEMIA, UNSPECIFIED HYPERLIPIDEMIA TYPE: ICD-10-CM

## 2025-08-25 DIAGNOSIS — R73.01 IFG (IMPAIRED FASTING GLUCOSE): ICD-10-CM

## 2025-08-25 DIAGNOSIS — M54.42 CHRONIC BILATERAL LOW BACK PAIN WITH LEFT-SIDED SCIATICA: ICD-10-CM

## 2025-08-25 DIAGNOSIS — Z12.5 PROSTATE CANCER SCREENING: ICD-10-CM

## 2025-08-25 DIAGNOSIS — G89.29 CHRONIC BILATERAL LOW BACK PAIN WITH LEFT-SIDED SCIATICA: ICD-10-CM

## 2025-08-25 DIAGNOSIS — I10 ESSENTIAL HYPERTENSION: ICD-10-CM

## 2025-08-25 PROCEDURE — 3075F SYST BP GE 130 - 139MM HG: CPT | Performed by: NURSE PRACTITIONER

## 2025-08-25 PROCEDURE — 3079F DIAST BP 80-89 MM HG: CPT | Performed by: NURSE PRACTITIONER

## 2025-08-25 PROCEDURE — 99214 OFFICE O/P EST MOD 30 MIN: CPT | Performed by: NURSE PRACTITIONER

## 2025-08-25 ASSESSMENT — ENCOUNTER SYMPTOMS
SINUS PAIN: 0
NAUSEA: 0
SHORTNESS OF BREATH: 0
SORE THROAT: 0
BACK PAIN: 1
VOMITING: 0
COUGH: 0
ABDOMINAL PAIN: 0
EYE PAIN: 0
DIARRHEA: 0